# Patient Record
Sex: MALE | Race: WHITE | NOT HISPANIC OR LATINO | Employment: UNEMPLOYED | URBAN - METROPOLITAN AREA
[De-identification: names, ages, dates, MRNs, and addresses within clinical notes are randomized per-mention and may not be internally consistent; named-entity substitution may affect disease eponyms.]

---

## 2021-06-21 ENCOUNTER — HOSPITAL ENCOUNTER (INPATIENT)
Facility: MEDICAL CENTER | Age: 61
LOS: 10 days | DRG: 224 | End: 2021-07-01
Attending: EMERGENCY MEDICINE | Admitting: HOSPITALIST
Payer: MEDICAID

## 2021-06-21 ENCOUNTER — HOSPITAL ENCOUNTER (OUTPATIENT)
Dept: RADIOLOGY | Facility: MEDICAL CENTER | Age: 61
End: 2021-06-21

## 2021-06-21 ENCOUNTER — APPOINTMENT (OUTPATIENT)
Dept: CARDIOLOGY | Facility: MEDICAL CENTER | Age: 61
DRG: 224 | End: 2021-06-21
Attending: INTERNAL MEDICINE
Payer: MEDICAID

## 2021-06-21 ENCOUNTER — APPOINTMENT (OUTPATIENT)
Dept: RADIOLOGY | Facility: MEDICAL CENTER | Age: 61
DRG: 224 | End: 2021-06-21
Attending: EMERGENCY MEDICINE
Payer: MEDICAID

## 2021-06-21 DIAGNOSIS — I63.9 CEREBROVASCULAR ACCIDENT (CVA), UNSPECIFIED MECHANISM (HCC): ICD-10-CM

## 2021-06-21 DIAGNOSIS — N17.9 AKI (ACUTE KIDNEY INJURY) (HCC): ICD-10-CM

## 2021-06-21 DIAGNOSIS — I25.5 ISCHEMIC CARDIOMYOPATHY: ICD-10-CM

## 2021-06-21 DIAGNOSIS — J96.00 ACUTE RESPIRATORY FAILURE, UNSPECIFIED WHETHER WITH HYPOXIA OR HYPERCAPNIA (HCC): ICD-10-CM

## 2021-06-21 DIAGNOSIS — I25.10 CORONARY ARTERY DISEASE INVOLVING NATIVE CORONARY ARTERY OF NATIVE HEART WITHOUT ANGINA PECTORIS: ICD-10-CM

## 2021-06-21 DIAGNOSIS — I46.9 CARDIAC ARREST (HCC): ICD-10-CM

## 2021-06-21 DIAGNOSIS — E87.0 HYPERNATREMIA: ICD-10-CM

## 2021-06-21 DIAGNOSIS — F10.920 ALCOHOLIC INTOXICATION WITHOUT COMPLICATION (HCC): ICD-10-CM

## 2021-06-21 DIAGNOSIS — I46.9 CARDIAC ARREST WITH SUCCESSFUL RESUSCITATION (HCC): ICD-10-CM

## 2021-06-21 PROBLEM — R73.9 HYPERGLYCEMIA: Status: ACTIVE | Noted: 2021-06-21

## 2021-06-21 PROBLEM — G93.40 ACUTE ENCEPHALOPATHY: Status: ACTIVE | Noted: 2021-06-21

## 2021-06-21 PROBLEM — E87.20 METABOLIC ACIDOSIS: Status: ACTIVE | Noted: 2021-06-21

## 2021-06-21 PROBLEM — F10.929 ALCOHOL INTOXICATION (HCC): Status: ACTIVE | Noted: 2021-06-21

## 2021-06-21 PROBLEM — D72.823 LEUKEMOID REACTION: Status: ACTIVE | Noted: 2021-06-21

## 2021-06-21 PROBLEM — E83.42 HYPOMAGNESEMIA: Status: ACTIVE | Noted: 2021-06-21

## 2021-06-21 PROBLEM — J96.01 ACUTE RESPIRATORY FAILURE WITH HYPOXIA (HCC): Status: ACTIVE | Noted: 2021-06-21

## 2021-06-21 LAB
ALBUMIN SERPL BCP-MCNC: 3.3 G/DL (ref 3.2–4.9)
ALBUMIN SERPL BCP-MCNC: 3.3 G/DL (ref 3.2–4.9)
ALBUMIN/GLOB SERPL: 1.2 G/DL
ALBUMIN/GLOB SERPL: 1.2 G/DL
ALP SERPL-CCNC: 63 U/L (ref 30–99)
ALP SERPL-CCNC: 73 U/L (ref 30–99)
ALT SERPL-CCNC: 36 U/L (ref 2–50)
ALT SERPL-CCNC: 39 U/L (ref 2–50)
AMPHET UR QL SCN: NEGATIVE
ANION GAP SERPL CALC-SCNC: 10 MMOL/L (ref 7–16)
ANION GAP SERPL CALC-SCNC: 11 MMOL/L (ref 7–16)
APTT PPP: 25.2 SEC (ref 24.7–36)
APTT PPP: 38.2 SEC (ref 24.7–36)
AST SERPL-CCNC: 54 U/L (ref 12–45)
AST SERPL-CCNC: 74 U/L (ref 12–45)
BARBITURATES UR QL SCN: NEGATIVE
BASE EXCESS BLDA CALC-SCNC: -2 MMOL/L (ref -4–3)
BASOPHILS # BLD AUTO: 0.3 % (ref 0–1.8)
BASOPHILS # BLD: 0.05 K/UL (ref 0–0.12)
BENZODIAZ UR QL SCN: POSITIVE
BILIRUB SERPL-MCNC: 0.3 MG/DL (ref 0.1–1.5)
BILIRUB SERPL-MCNC: 0.5 MG/DL (ref 0.1–1.5)
BODY TEMPERATURE: ABNORMAL DEGREES
BREATHS SETTING VENT: 22
BUN SERPL-MCNC: 12 MG/DL (ref 8–22)
BUN SERPL-MCNC: 9 MG/DL (ref 8–22)
BZE UR QL SCN: NEGATIVE
CALCIUM SERPL-MCNC: 7.9 MG/DL (ref 8.5–10.5)
CALCIUM SERPL-MCNC: 8.2 MG/DL (ref 8.5–10.5)
CANNABINOIDS UR QL SCN: POSITIVE
CHLORIDE SERPL-SCNC: 107 MMOL/L (ref 96–112)
CHLORIDE SERPL-SCNC: 112 MMOL/L (ref 96–112)
CHOLEST SERPL-MCNC: 151 MG/DL (ref 100–199)
CO2 BLDA-SCNC: 22 MMOL/L (ref 20–33)
CO2 SERPL-SCNC: 20 MMOL/L (ref 20–33)
CO2 SERPL-SCNC: 23 MMOL/L (ref 20–33)
CREAT SERPL-MCNC: 0.76 MG/DL (ref 0.5–1.4)
CREAT SERPL-MCNC: 0.83 MG/DL (ref 0.5–1.4)
DELSYS IDSYS: ABNORMAL
EKG IMPRESSION: NORMAL
EOSINOPHIL # BLD AUTO: 0.02 K/UL (ref 0–0.51)
EOSINOPHIL NFR BLD: 0.1 % (ref 0–6.9)
ERYTHROCYTE [DISTWIDTH] IN BLOOD BY AUTOMATED COUNT: 50.5 FL (ref 35.9–50)
EST. AVERAGE GLUCOSE BLD GHB EST-MCNC: 97 MG/DL
GLOBULIN SER CALC-MCNC: 2.7 G/DL (ref 1.9–3.5)
GLOBULIN SER CALC-MCNC: 2.8 G/DL (ref 1.9–3.5)
GLUCOSE BLD-MCNC: 122 MG/DL (ref 65–99)
GLUCOSE BLD-MCNC: 87 MG/DL (ref 65–99)
GLUCOSE BLD-MCNC: 89 MG/DL (ref 65–99)
GLUCOSE SERPL-MCNC: 104 MG/DL (ref 65–99)
GLUCOSE SERPL-MCNC: 108 MG/DL (ref 65–99)
HBA1C MFR BLD: 5 % (ref 4–5.6)
HCO3 BLDA-SCNC: 20.9 MMOL/L (ref 17–25)
HCT VFR BLD AUTO: 41.1 % (ref 42–52)
HDLC SERPL-MCNC: 33 MG/DL
HGB BLD-MCNC: 13.7 G/DL (ref 14–18)
HOROWITZ INDEX BLDA+IHG-RTO: 184 MM[HG]
IMM GRANULOCYTES # BLD AUTO: 0.15 K/UL (ref 0–0.11)
IMM GRANULOCYTES NFR BLD AUTO: 1 % (ref 0–0.9)
INR PPP: 1.09 (ref 0.87–1.13)
INR PPP: 1.1 (ref 0.87–1.13)
LDLC SERPL CALC-MCNC: 87 MG/DL
LV EJECT FRACT  99904: 35
LV EJECT FRACT MOD 2C 99903: 24.26
LV EJECT FRACT MOD 4C 99902: 40.19
LV EJECT FRACT MOD BP 99901: 34.22
LYMPHOCYTES # BLD AUTO: 1.07 K/UL (ref 1–4.8)
LYMPHOCYTES NFR BLD: 6.9 % (ref 22–41)
MAGNESIUM SERPL-MCNC: 1.8 MG/DL (ref 1.5–2.5)
MAGNESIUM SERPL-MCNC: 2.2 MG/DL (ref 1.5–2.5)
MCH RBC QN AUTO: 32.5 PG (ref 27–33)
MCHC RBC AUTO-ENTMCNC: 33.3 G/DL (ref 33.7–35.3)
MCV RBC AUTO: 97.6 FL (ref 81.4–97.8)
METHADONE UR QL SCN: NEGATIVE
MODE IMODE: ABNORMAL
MONOCYTES # BLD AUTO: 0.89 K/UL (ref 0–0.85)
MONOCYTES NFR BLD AUTO: 5.8 % (ref 0–13.4)
NEUTROPHILS # BLD AUTO: 13.25 K/UL (ref 1.82–7.42)
NEUTROPHILS NFR BLD: 85.9 % (ref 44–72)
NRBC # BLD AUTO: 0 K/UL
NRBC BLD-RTO: 0 /100 WBC
NT-PROBNP SERPL IA-MCNC: 419 PG/ML (ref 0–125)
O2/TOTAL GAS SETTING VFR VENT: 55 %
OPIATES UR QL SCN: NEGATIVE
OXYCODONE UR QL SCN: NEGATIVE
PCO2 BLDA: 29.7 MMHG (ref 26–37)
PCO2 TEMP ADJ BLDA: 28.6 MMHG (ref 26–37)
PCP UR QL SCN: NEGATIVE
PEEP END EXPIRATORY PRESSURE IPEEP: 8 CMH20
PH BLDA: 7.46 [PH] (ref 7.4–7.5)
PH TEMP ADJ BLDA: 7.47 [PH] (ref 7.4–7.5)
PHOSPHATE SERPL-MCNC: 2.9 MG/DL (ref 2.5–4.5)
PLATELET # BLD AUTO: 240 K/UL (ref 164–446)
PMV BLD AUTO: 9.8 FL (ref 9–12.9)
PO2 BLDA: 101 MMHG (ref 64–87)
PO2 TEMP ADJ BLDA: 96 MMHG (ref 64–87)
POTASSIUM SERPL-SCNC: 4 MMOL/L (ref 3.6–5.5)
POTASSIUM SERPL-SCNC: 4.5 MMOL/L (ref 3.6–5.5)
PROPOXYPH UR QL SCN: NEGATIVE
PROT SERPL-MCNC: 6 G/DL (ref 6–8.2)
PROT SERPL-MCNC: 6.1 G/DL (ref 6–8.2)
PROTHROMBIN TIME: 13.8 SEC (ref 12–14.6)
PROTHROMBIN TIME: 13.9 SEC (ref 12–14.6)
RBC # BLD AUTO: 4.21 M/UL (ref 4.7–6.1)
SAO2 % BLDA: 98 % (ref 93–99)
SODIUM SERPL-SCNC: 141 MMOL/L (ref 135–145)
SODIUM SERPL-SCNC: 142 MMOL/L (ref 135–145)
SPECIMEN DRAWN FROM PATIENT: ABNORMAL
TIDAL VOLUME IVT: 450 ML
TRIGL SERPL-MCNC: 155 MG/DL (ref 0–149)
TROPONIN T SERPL-MCNC: 230 NG/L (ref 6–19)
TROPONIN T SERPL-MCNC: 429 NG/L (ref 6–19)
TROPONIN T SERPL-MCNC: 582 NG/L (ref 6–19)
UFH PPP CHRO-ACNC: <0.1 IU/ML
UFH PPP CHRO-ACNC: <0.1 IU/ML
WBC # BLD AUTO: 15.4 K/UL (ref 4.8–10.8)

## 2021-06-21 PROCEDURE — 85025 COMPLETE CBC W/AUTO DIFF WBC: CPT

## 2021-06-21 PROCEDURE — 700101 HCHG RX REV CODE 250: Performed by: INTERNAL MEDICINE

## 2021-06-21 PROCEDURE — A9270 NON-COVERED ITEM OR SERVICE: HCPCS | Performed by: INTERNAL MEDICINE

## 2021-06-21 PROCEDURE — 700105 HCHG RX REV CODE 258: Performed by: HOSPITALIST

## 2021-06-21 PROCEDURE — 93005 ELECTROCARDIOGRAM TRACING: CPT | Performed by: HOSPITALIST

## 2021-06-21 PROCEDURE — 93005 ELECTROCARDIOGRAM TRACING: CPT | Performed by: EMERGENCY MEDICINE

## 2021-06-21 PROCEDURE — 5A1935Z RESPIRATORY VENTILATION, LESS THAN 24 CONSECUTIVE HOURS: ICD-10-PCS | Performed by: EMERGENCY MEDICINE

## 2021-06-21 PROCEDURE — 96368 THER/DIAG CONCURRENT INF: CPT

## 2021-06-21 PROCEDURE — 99223 1ST HOSP IP/OBS HIGH 75: CPT | Performed by: HOSPITALIST

## 2021-06-21 PROCEDURE — 700111 HCHG RX REV CODE 636 W/ 250 OVERRIDE (IP)

## 2021-06-21 PROCEDURE — 80307 DRUG TEST PRSMV CHEM ANLYZR: CPT

## 2021-06-21 PROCEDURE — 0BP1XDZ REMOVAL OF INTRALUMINAL DEVICE FROM TRACHEA, EXTERNAL APPROACH: ICD-10-PCS | Performed by: STUDENT IN AN ORGANIZED HEALTH CARE EDUCATION/TRAINING PROGRAM

## 2021-06-21 PROCEDURE — 93010 ELECTROCARDIOGRAM REPORT: CPT | Performed by: INTERNAL MEDICINE

## 2021-06-21 PROCEDURE — 84484 ASSAY OF TROPONIN QUANT: CPT | Mod: 91

## 2021-06-21 PROCEDURE — 84100 ASSAY OF PHOSPHORUS: CPT

## 2021-06-21 PROCEDURE — 700102 HCHG RX REV CODE 250 W/ 637 OVERRIDE(OP): Performed by: INTERNAL MEDICINE

## 2021-06-21 PROCEDURE — 700111 HCHG RX REV CODE 636 W/ 250 OVERRIDE (IP): Performed by: HOSPITALIST

## 2021-06-21 PROCEDURE — 83036 HEMOGLOBIN GLYCOSYLATED A1C: CPT

## 2021-06-21 PROCEDURE — 83735 ASSAY OF MAGNESIUM: CPT

## 2021-06-21 PROCEDURE — 80061 LIPID PANEL: CPT

## 2021-06-21 PROCEDURE — 85520 HEPARIN ASSAY: CPT | Mod: 91

## 2021-06-21 PROCEDURE — 770022 HCHG ROOM/CARE - ICU (200)

## 2021-06-21 PROCEDURE — 700111 HCHG RX REV CODE 636 W/ 250 OVERRIDE (IP): Performed by: INTERNAL MEDICINE

## 2021-06-21 PROCEDURE — 99255 IP/OBS CONSLTJ NEW/EST HI 80: CPT | Performed by: INTERNAL MEDICINE

## 2021-06-21 PROCEDURE — 94799 UNLISTED PULMONARY SVC/PX: CPT

## 2021-06-21 PROCEDURE — 82962 GLUCOSE BLOOD TEST: CPT | Mod: 91

## 2021-06-21 PROCEDURE — 96366 THER/PROPH/DIAG IV INF ADDON: CPT

## 2021-06-21 PROCEDURE — 93306 TTE W/DOPPLER COMPLETE: CPT

## 2021-06-21 PROCEDURE — 71045 X-RAY EXAM CHEST 1 VIEW: CPT

## 2021-06-21 PROCEDURE — 700102 HCHG RX REV CODE 250 W/ 637 OVERRIDE(OP): Performed by: HOSPITALIST

## 2021-06-21 PROCEDURE — 94002 VENT MGMT INPAT INIT DAY: CPT

## 2021-06-21 PROCEDURE — 99291 CRITICAL CARE FIRST HOUR: CPT

## 2021-06-21 PROCEDURE — 93306 TTE W/DOPPLER COMPLETE: CPT | Mod: 26 | Performed by: INTERNAL MEDICINE

## 2021-06-21 PROCEDURE — 94003 VENT MGMT INPAT SUBQ DAY: CPT

## 2021-06-21 PROCEDURE — 700111 HCHG RX REV CODE 636 W/ 250 OVERRIDE (IP): Performed by: EMERGENCY MEDICINE

## 2021-06-21 PROCEDURE — 82803 BLOOD GASES ANY COMBINATION: CPT

## 2021-06-21 PROCEDURE — 85610 PROTHROMBIN TIME: CPT | Mod: 91

## 2021-06-21 PROCEDURE — 96375 TX/PRO/DX INJ NEW DRUG ADDON: CPT

## 2021-06-21 PROCEDURE — 85730 THROMBOPLASTIN TIME PARTIAL: CPT | Mod: 91

## 2021-06-21 PROCEDURE — 96365 THER/PROPH/DIAG IV INF INIT: CPT

## 2021-06-21 PROCEDURE — 36600 WITHDRAWAL OF ARTERIAL BLOOD: CPT

## 2021-06-21 PROCEDURE — 80053 COMPREHEN METABOLIC PANEL: CPT | Mod: 91

## 2021-06-21 PROCEDURE — 83880 ASSAY OF NATRIURETIC PEPTIDE: CPT

## 2021-06-21 PROCEDURE — 99291 CRITICAL CARE FIRST HOUR: CPT | Performed by: INTERNAL MEDICINE

## 2021-06-21 PROCEDURE — A9270 NON-COVERED ITEM OR SERVICE: HCPCS | Performed by: HOSPITALIST

## 2021-06-21 RX ORDER — NALOXONE HYDROCHLORIDE 0.4 MG/ML
0.1 INJECTION, SOLUTION INTRAMUSCULAR; INTRAVENOUS; SUBCUTANEOUS ONCE
Status: ACTIVE | OUTPATIENT
Start: 2021-06-21 | End: 2021-06-22

## 2021-06-21 RX ORDER — ENALAPRILAT 1.25 MG/ML
1.25 INJECTION INTRAVENOUS EVERY 6 HOURS PRN
Status: DISCONTINUED | OUTPATIENT
Start: 2021-06-21 | End: 2021-07-01 | Stop reason: HOSPADM

## 2021-06-21 RX ORDER — FOLIC ACID 1 MG/1
1 TABLET ORAL DAILY
Status: COMPLETED | OUTPATIENT
Start: 2021-06-22 | End: 2021-06-25

## 2021-06-21 RX ORDER — ONDANSETRON 2 MG/ML
4 INJECTION INTRAMUSCULAR; INTRAVENOUS EVERY 4 HOURS PRN
Status: DISCONTINUED | OUTPATIENT
Start: 2021-06-21 | End: 2021-07-01 | Stop reason: HOSPADM

## 2021-06-21 RX ORDER — MIDAZOLAM HYDROCHLORIDE 1 MG/ML
4 INJECTION INTRAMUSCULAR; INTRAVENOUS ONCE
Status: COMPLETED | OUTPATIENT
Start: 2021-06-21 | End: 2021-06-21

## 2021-06-21 RX ORDER — HEPARIN SODIUM 1000 [USP'U]/ML
2000 INJECTION, SOLUTION INTRAVENOUS; SUBCUTANEOUS PRN
Status: DISCONTINUED | OUTPATIENT
Start: 2021-06-21 | End: 2021-06-27

## 2021-06-21 RX ORDER — NALOXONE HYDROCHLORIDE 0.4 MG/ML
INJECTION, SOLUTION INTRAMUSCULAR; INTRAVENOUS; SUBCUTANEOUS
Status: COMPLETED
Start: 2021-06-21 | End: 2021-06-21

## 2021-06-21 RX ORDER — MAGNESIUM SULFATE HEPTAHYDRATE 40 MG/ML
2 INJECTION, SOLUTION INTRAVENOUS ONCE
Status: COMPLETED | OUTPATIENT
Start: 2021-06-21 | End: 2021-06-21

## 2021-06-21 RX ORDER — ATORVASTATIN CALCIUM 40 MG/1
40 TABLET, FILM COATED ORAL EVERY EVENING
Status: DISCONTINUED | OUTPATIENT
Start: 2021-06-21 | End: 2021-06-25

## 2021-06-21 RX ORDER — SODIUM CHLORIDE, SODIUM LACTATE, POTASSIUM CHLORIDE, CALCIUM CHLORIDE 600; 310; 30; 20 MG/100ML; MG/100ML; MG/100ML; MG/100ML
INJECTION, SOLUTION INTRAVENOUS CONTINUOUS
Status: DISCONTINUED | OUTPATIENT
Start: 2021-06-21 | End: 2021-06-23

## 2021-06-21 RX ORDER — ASPIRIN 325 MG
325 TABLET ORAL DAILY
Status: DISCONTINUED | OUTPATIENT
Start: 2021-06-22 | End: 2021-06-22

## 2021-06-21 RX ORDER — BISACODYL 10 MG
10 SUPPOSITORY, RECTAL RECTAL
Status: DISCONTINUED | OUTPATIENT
Start: 2021-06-21 | End: 2021-07-01 | Stop reason: HOSPADM

## 2021-06-21 RX ORDER — PROCHLORPERAZINE EDISYLATE 5 MG/ML
5-10 INJECTION INTRAMUSCULAR; INTRAVENOUS EVERY 4 HOURS PRN
Status: DISCONTINUED | OUTPATIENT
Start: 2021-06-21 | End: 2021-07-01 | Stop reason: HOSPADM

## 2021-06-21 RX ORDER — ASPIRIN 81 MG/1
324 TABLET, CHEWABLE ORAL DAILY
Status: DISCONTINUED | OUTPATIENT
Start: 2021-06-22 | End: 2021-06-22

## 2021-06-21 RX ORDER — HEPARIN SODIUM 5000 [USP'U]/100ML
0-30 INJECTION, SOLUTION INTRAVENOUS CONTINUOUS
Status: DISCONTINUED | OUTPATIENT
Start: 2021-06-21 | End: 2021-06-23

## 2021-06-21 RX ORDER — AMOXICILLIN 250 MG
2 CAPSULE ORAL 2 TIMES DAILY
Status: DISCONTINUED | OUTPATIENT
Start: 2021-06-21 | End: 2021-07-01 | Stop reason: HOSPADM

## 2021-06-21 RX ORDER — LIDOCAINE 50 MG/G
1 PATCH TOPICAL EVERY 24 HOURS
Status: DISCONTINUED | OUTPATIENT
Start: 2021-06-21 | End: 2021-07-01 | Stop reason: HOSPADM

## 2021-06-21 RX ORDER — FAMOTIDINE 20 MG/1
20 TABLET, FILM COATED ORAL EVERY 12 HOURS
Status: DISCONTINUED | OUTPATIENT
Start: 2021-06-21 | End: 2021-06-22

## 2021-06-21 RX ORDER — PROMETHAZINE HYDROCHLORIDE 25 MG/1
12.5-25 TABLET ORAL EVERY 4 HOURS PRN
Status: DISCONTINUED | OUTPATIENT
Start: 2021-06-21 | End: 2021-07-01 | Stop reason: HOSPADM

## 2021-06-21 RX ORDER — ASPIRIN 300 MG/1
300 SUPPOSITORY RECTAL DAILY
Status: DISCONTINUED | OUTPATIENT
Start: 2021-06-22 | End: 2021-06-22

## 2021-06-21 RX ORDER — ONDANSETRON 4 MG/1
4 TABLET, ORALLY DISINTEGRATING ORAL EVERY 4 HOURS PRN
Status: DISCONTINUED | OUTPATIENT
Start: 2021-06-21 | End: 2021-07-01 | Stop reason: HOSPADM

## 2021-06-21 RX ORDER — ACETAMINOPHEN 325 MG/1
650 TABLET ORAL EVERY 6 HOURS PRN
Status: DISCONTINUED | OUTPATIENT
Start: 2021-06-21 | End: 2021-07-01 | Stop reason: HOSPADM

## 2021-06-21 RX ORDER — POLYETHYLENE GLYCOL 3350 17 G/17G
1 POWDER, FOR SOLUTION ORAL
Status: DISCONTINUED | OUTPATIENT
Start: 2021-06-21 | End: 2021-07-01 | Stop reason: HOSPADM

## 2021-06-21 RX ORDER — GAUZE BANDAGE 2" X 2"
100 BANDAGE TOPICAL DAILY
Status: COMPLETED | OUTPATIENT
Start: 2021-06-22 | End: 2021-06-25

## 2021-06-21 RX ORDER — HEPARIN SODIUM 1000 [USP'U]/ML
4000 INJECTION, SOLUTION INTRAVENOUS; SUBCUTANEOUS ONCE
Status: COMPLETED | OUTPATIENT
Start: 2021-06-21 | End: 2021-06-21

## 2021-06-21 RX ORDER — PROMETHAZINE HYDROCHLORIDE 25 MG/1
12.5-25 SUPPOSITORY RECTAL EVERY 4 HOURS PRN
Status: DISCONTINUED | OUTPATIENT
Start: 2021-06-21 | End: 2021-07-01 | Stop reason: HOSPADM

## 2021-06-21 RX ORDER — LABETALOL HYDROCHLORIDE 5 MG/ML
10-20 INJECTION, SOLUTION INTRAVENOUS EVERY 4 HOURS PRN
Status: DISCONTINUED | OUTPATIENT
Start: 2021-06-21 | End: 2021-07-01 | Stop reason: HOSPADM

## 2021-06-21 RX ADMIN — PROPOFOL 50 MCG/KG/MIN: 10 INJECTION, EMULSION INTRAVENOUS at 10:20

## 2021-06-21 RX ADMIN — MIDAZOLAM HYDROCHLORIDE 4 MG: 1 INJECTION, SOLUTION INTRAMUSCULAR; INTRAVENOUS at 08:00

## 2021-06-21 RX ADMIN — INSULIN HUMAN 3 UNITS: 100 INJECTION, SOLUTION PARENTERAL at 13:11

## 2021-06-21 RX ADMIN — HEPARIN SODIUM 2000 UNITS: 1000 INJECTION, SOLUTION INTRAVENOUS; SUBCUTANEOUS at 21:39

## 2021-06-21 RX ADMIN — Medication 75 MCG: at 09:09

## 2021-06-21 RX ADMIN — ACETAMINOPHEN 650 MG: 325 TABLET, FILM COATED ORAL at 20:43

## 2021-06-21 RX ADMIN — HEPARIN SODIUM 11.09 UNITS/KG/HR: 5000 INJECTION, SOLUTION INTRAVENOUS at 09:20

## 2021-06-21 RX ADMIN — DOCUSATE SODIUM 50 MG AND SENNOSIDES 8.6 MG 2 TABLET: 8.6; 5 TABLET, FILM COATED ORAL at 17:22

## 2021-06-21 RX ADMIN — HEPARIN SODIUM 4000 UNITS: 1000 INJECTION, SOLUTION INTRAVENOUS; SUBCUTANEOUS at 09:23

## 2021-06-21 RX ADMIN — ATORVASTATIN CALCIUM 40 MG: 40 TABLET, FILM COATED ORAL at 17:22

## 2021-06-21 RX ADMIN — FAMOTIDINE 20 MG: 10 INJECTION INTRAVENOUS at 17:22

## 2021-06-21 RX ADMIN — METOPROLOL TARTRATE 12.5 MG: 25 TABLET, FILM COATED ORAL at 17:29

## 2021-06-21 RX ADMIN — THIAMINE HYDROCHLORIDE: 100 INJECTION, SOLUTION INTRAMUSCULAR; INTRAVENOUS at 10:20

## 2021-06-21 RX ADMIN — MAGNESIUM SULFATE 2 G: 2 INJECTION INTRAVENOUS at 13:10

## 2021-06-21 RX ADMIN — PROPOFOL 10 MCG/KG/MIN: 10 INJECTION, EMULSION INTRAVENOUS at 07:00

## 2021-06-21 RX ADMIN — LIDOCAINE 1 PATCH: 50 PATCH TOPICAL at 16:47

## 2021-06-21 RX ADMIN — NALOXONE HYDROCHLORIDE 0.1 MG: 0.4 INJECTION, SOLUTION INTRAMUSCULAR; INTRAVENOUS; SUBCUTANEOUS at 14:52

## 2021-06-21 RX ADMIN — SODIUM CHLORIDE, POTASSIUM CHLORIDE, SODIUM LACTATE AND CALCIUM CHLORIDE: 600; 310; 30; 20 INJECTION, SOLUTION INTRAVENOUS at 12:33

## 2021-06-21 ASSESSMENT — PAIN SCALES - WONG BAKER: WONGBAKER_NUMERICALRESPONSE: DOESN'T HURT AT ALL

## 2021-06-21 ASSESSMENT — PAIN DESCRIPTION - PAIN TYPE
TYPE: ACUTE PAIN
TYPE: ACUTE PAIN

## 2021-06-21 ASSESSMENT — LIFESTYLE VARIABLES: DOES PATIENT WANT TO STOP DRINKING: CANNOT ASSESS

## 2021-06-21 ASSESSMENT — COGNITIVE AND FUNCTIONAL STATUS - GENERAL
MOBILITY SCORE: 24
SUGGESTED CMS G CODE MODIFIER MOBILITY: CH

## 2021-06-21 ASSESSMENT — PATIENT HEALTH QUESTIONNAIRE - PHQ9
1. LITTLE INTEREST OR PLEASURE IN DOING THINGS: NOT AT ALL
2. FEELING DOWN, DEPRESSED, IRRITABLE, OR HOPELESS: NOT AT ALL
SUM OF ALL RESPONSES TO PHQ9 QUESTIONS 1 AND 2: 0

## 2021-06-21 ASSESSMENT — FIBROSIS 4 INDEX
FIB4 SCORE: 2.325
FIB4 SCORE: 1.43

## 2021-06-21 NOTE — PROGRESS NOTES
----- Message from Radha Dumont Tatyana Huerta updated with the address/phone number for [facility] and given instructions to go there and they will let her into patient's house.

## 2021-06-21 NOTE — PROGRESS NOTES
4 Eyes Skin Assessment Completed by TATE Christiansen and TATE Limon.    Head WDL  Ears WDL  Nose WDL  Mouth WDL  Neck WDL  Breast/Chest WDL  Shoulder Blades WDL  Spine WDL  (R) Arm/Elbow/Hand WDL  (L) Arm/Elbow/Hand WDL  Abdomen Bruising  Groin WDL  Scrotum/Coccyx/Buttocks WDL  (R) Leg Scar  (L) Leg WDL  (R) Heel/Foot/Toe WDL  (L) Heel/Foot/Toe WDL          Devices In Places OG/NG      Interventions In Place Low Air Loss Mattress    Possible Skin Injury No    Pictures Uploaded Into Epic N/A  Wound Consult Placed N/A  RN Wound Prevention Protocol Ordered No

## 2021-06-21 NOTE — ED NOTES
Report given to TATE Limon. Waiting for ICU transport to [facility]. Pt remains stable.

## 2021-06-21 NOTE — ASSESSMENT & PLAN NOTE
Renal function returned to normal  Likely component of ATN in the setting of cardiac arrest  Avoid nephrotoxins renally dose medications  Monitor creatinine, urine output, electrolytes closely

## 2021-06-21 NOTE — H&P
Hospital Medicine History & Physical Note    Date of Service  6/21/2021    Primary Care Physician  No primary care provider on file.    Consultants  Cardiology    Code Status  Full Code    Chief Complaint  Chief Complaint   Patient presents with   • Cardiac Arrest     PT transfered from [place].  PT was shocked x 1 by AED and transported to ED with pulses.       History of Presenting Illness  60 y.o. male who presented 6/21/2021 who had an out of hospital arrest, and consequently there is little previous medical history available to us.    Patient was apparently at a [place] in [facility], when he was found down for an unknown amount of time.  Bystander CPR was initiated, and an AED was attached which discharged x1.  When EMS arrived they placed an OPA and started transport.  He had several rounds of CPR in transit, and apparently had return of neurologic function to the degree that he remove the OPA.  At that point he was combative, but was able to tell the paramedics his name.  He was subsequently intubated in the emergency room at [facility], and sent to us.  Prior to transport he was bolused with heparin and given aspirin.    Here in our ED the patient has had stable vital signs, on the ventilator.  He has woken up to the point where he was following commands.  EKG has been done, shows low voltage, no evidence of acute ischemic changes and specifically no ST elevation.  Review of systems and HPI is limited as the patient is still intubated.    Review of Systems  Review of Systems   Unable to perform ROS: Intubated       Past Medical History  Unobtainable as the patient is intubated    Surgical History  Unobtainable as the patient is intubated    Family History  Unobtainable as the patient is intubated    Social History   reports that he has never smoked. He has never used smokeless tobacco. He reports current alcohol use. He reports that he does not  use drugs.  Details unknown however the patient does present acutely intoxicated so evidently he drinks at least sometimes.  Allergies  No Known Allergies    Medications  None       Physical Exam  Temp:  [36.1 °C (97 °F)-36.8 °C (98.2 °F)] 36.8 °C (98.2 °F)  Pulse:  [] 103  Resp:  [18-22] 20  BP: ()/() 129/81  SpO2:  [69 %-100 %] 100 %    Physical Exam  Vitals reviewed.   Constitutional:       General: He is not in acute distress.     Appearance: Normal appearance. He is well-developed. He is obese. He is not diaphoretic.   HENT:      Head: Normocephalic and atraumatic.   Eyes:      Conjunctiva/sclera: Conjunctivae normal.   Neck:      Vascular: No JVD.   Cardiovascular:      Rate and Rhythm: Normal rate.      Heart sounds: No murmur heard.   No gallop.    Pulmonary:      Effort: No respiratory distress.      Breath sounds: No stridor. No wheezing or rales.      Comments: Intubated  Abdominal:      Palpations: Abdomen is soft.      Tenderness: There is no abdominal tenderness. There is no guarding or rebound.   Musculoskeletal:         General: No tenderness.      Right lower leg: Edema present.      Left lower leg: Edema present.      Comments: Trace edema right greater than left no palpable cords   Skin:     General: Skin is warm and dry.      Findings: No rash.   Neurological:      Mental Status: He is alert.      Comments: On my examination the patient is moving all 4 extremities purposefully.  Exam is limited as he is on propofol         Laboratory:  Recent Labs     06/21/21 0415 06/21/21 0626   WBC 14.7* 15.4*   RBC 4.60* 4.21*   HEMOGLOBIN 14.9 13.7*   HEMATOCRIT 45.8 41.1*   MCV 99.6* 97.6   MCH 32.4* 32.5   MCHC 32.5* 33.3*   RDW 13.7 50.5*   PLATELETCT 326 240   MPV 9.3 9.8     Recent Labs     06/21/21 0415   SODIUM 152*   POTASSIUM 4.0   CHLORIDE 113*   CO2 15*   GLUCOSE 181*   BUN 12   CREATININE 1.3   CALCIUM 8.4*     Recent Labs     06/21/21 0415   ALTSGPT 48   ASTSGOT 52*    ALKPHOSPHAT 81   TBILIRUBIN 0.2   GLUCOSE 181*     Recent Labs     06/21/21  0415   APTT 21.0*   INR 0.95     Recent Labs     06/21/21  0415   NTPROBNP 188*         No results for input(s): TROPONINT in the last 72 hours.    Imaging:  EC-ECHOCARDIOGRAM COMPLETE W/O CONT         OUTSIDE IMAGES-CT CERVICAL SPINE   Final Result      OUTSIDE IMAGES-CT HEAD   Final Result      OUTSIDE IMAGES-DX CHEST   Final Result      DX-CHEST-PORTABLE (1 VIEW)   Final Result         Intubated.      Patchy bilateral lower lung opacities, atelectasis or infection.      Mildly prominent cardiopericardial silhouette.            Assessment/Plan:  I anticipate this patient will require at least two midnights for appropriate medical management, necessitating inpatient admission.    Alcohol intoxication (HCC)  Assessment & Plan  Patient presents with an elevated BAL unknown if he is a chronic drinker.    Metabolic acidosis  Assessment & Plan  Patient presents with metabolic acidosis with elevated lactic acid  Continue fluid resuscitation is clearly hypovolemic cautiously however he may have an acute ischemic event.  Repeat lactic acid  Doubt sepsis as chest x-ray is clear UA does not indicate infection or just a physical exam show any focus of infection.    Hyperglycemia  Assessment & Plan  Suspect underlying diabetes versus stress response however history is unknown  Sliding scale insulin  Check A1c    Hypernatremia  Assessment & Plan  Continue fluid resuscitation with lactated Ringer's    WEN (acute kidney injury) ([facility])  Assessment & Plan  Some degree of prerenal based on hypovolemia  Continue fluid resuscitation  Renally dose medications as appropriate  Follow urine output, Place Madd[place]  Follow daily BMP  Consider further work-up if he does not normalize    Acute respiratory failure with hypoxia (HCC)  Assessment & Plan  Patient intubated for airway protection following out-of-hospital arrest  Appears to have survived event  neurologically  Admit to the ICU  Critical care has been consulted    Cardiac arrest (HCC)  Assessment & Plan  Patient suffered an out of hospital arrest with a shockable rhythm unfortunately do not have rhythm strip to give us more details.  Cardiology has been consulted  Admit to the ICU  Continue telemetry  Check magnesium  Follow troponins  Cardiac echo  We will likely need an ischemic work-up  ASA  Heparin drip  Statin

## 2021-06-21 NOTE — ASSESSMENT & PLAN NOTE
Slowly better but still confused enough to be inappropriate for consent  Secondary to arrest versus alcohol intoxication?  Likely the former, monitoring for withdrawal secondary to the latter  Limit sedatives with close neurologic monitoring  Seizure and aspiration precautions  Suspect mild anoxic injury from arrest, hopefully will improve with time, monitoring  Brother thought his short-term memory was off during conversation with patient and him 6/22

## 2021-06-21 NOTE — PROGRESS NOTES
"Patient became very fixated on leaving to care for his cats. Per patient, his brother Kam is dead. He gave permission to go through his phone and asked this RN to find Tatyana Huerta and to call her and ask her to go to his house to feed his cats. Patient gave permission to tell Tatyana that is he is in the hospital and why. This RN called Tatyana on patient's phone and explained situation and asked her to feed/care for patient's cats. Tatyana agreed to care for cats but has no way to get into the house. Patient's keys are not with his belongings. Tatyana could not stay on the phone but requested a call back with directions of how to get into the patient's house.    After that phone call patient directed this RN to find \"Sarai Lopez\" in his phone (his landlord) and to explain the situation and to request Tatyana be let into his residence. This RN called Sarai Lopez and explained the situation. They verified the request with the patient and were agreeable to give Tatyana a narvaez to the apartment if she swings by.   "

## 2021-06-21 NOTE — ASSESSMENT & PLAN NOTE
Patient intubated for airway protection following out-of-hospital arrest  Extubated  Saturating well on room air.  Resolved 6/29

## 2021-06-21 NOTE — ED TRIAGE NOTES
"Chief Complaint   Patient presents with   • Cardiac Arrest     PT transfered from [place] post arrest in local [facility].  PT was shocked x 1 by AED and transported to ED with pulses.     Blood Pressure 123/78   Pulse 100   Temperature 36.8 °C (98.2 °F) (Temporal)   Respiration 18   Height 1.778 m (5' 10\")   Weight 116 kg (255 lb 11.7 oz)   Oxygen Saturation 94%   Body Mass Index 36.69 kg/m²     "

## 2021-06-21 NOTE — DISCHARGE PLANNING
Care Transition Team Discharge Planning    Anticipated Discharge Disposition: TBD    Action: Lsw received call from Bs RN. Pt will need consents signed. Pt is not currently A/O.     Lsw sent email requesting Tomo ClasesK search.      NOK results:  Found some possible relatives:    Tremaine Rosenbaum @ [phone]. Left VM.    Saleem LAWRENCE Deanna @ [phone] is not accepting calls at this time.    Mg MARINA Rosenbaum @ [phone] was disconnected.    Nga Díazabdoulaye @ [phone] the VM stated Any and [facility]. Lsw started to leave a VM, and the phone clicked, but no one was on the line.    Roxana Nassar @ [phone] there was a busy signal.    Raine Rosenbaum the number is no longer in service.    ---LSw called Nga again-pt is her ex-, ezkyps-Qubkn-aw's father and mother are , don't know how to reach either of pt's 2 brothers      ----Nga has number for pt's friend Donovan Polanco [phone]. He may know pt's brother's numbers. Lsw left VM.                Barriers to Discharge: TBD    Plan: Lsw will continue to follow, and assist w/ d/c planning.

## 2021-06-21 NOTE — CONSULTS
Critical Care Consultation    Date of consult: 6/21/2021    Referring Physician  Eris Serna M.D.;Je*    Reason for Consultation  Cardiac arrest, resp failure    History of Presenting Illness  60 y.o. male who presented 6/21/2021 with an unknown past medical history [place] when he suffered a witnessed cardiac arrest.  He underwent bystander CPR and AED was placed which defibrillated the patient x1.  Upon arrival by EMS, he remained pulseless and they performed ACLS for an additional 2 rounds before regaining spontaneous circulation.  They transported patient to the emergency department in [place] with an oropharyngeal airway in place.  Upon arrival to the ED, patient removed his own OPA and was very agitated and confused attempting to get out of bed.  He was subsequently intubated for airway protection and to facilitate work-up.  He was started on a propofol, fentanyl, heparin drip and given 300 mg of rectal aspirin.  A CT head and C-spine was performed which were both unremarkable before being transferred to [facility] for higher level of care.  In the emergency department here, patient awakens and follows but remains agitated and was seen by cardiology with plans for echocardiography.  He will not undergo code chill given his neurologic recovery.  No additional history is able to be obtained at this time given patient's current clinical condition.    Code Status  Full Code    Review of Systems  Review of Systems   Unable to perform ROS: Intubated       Past Medical History   has no past medical history on file. - unknown    Surgical History   has no past surgical history on file. - unknown    Family History  family history is not on file. - unknown    Social History   reports that he has never smoked. He has never used smokeless tobacco. He reports current alcohol use. He reports that he does not use drugs.    Medications  Home Medications     Reviewed by Destiny Vera PhT  (Pharmacy Tech) on 06/21/21 at 0816  Med List Status: Unable to Obtain   Medication Last Dose Status        Patient Estrada Taking any Medications                     Current Facility-Administered Medications   Medication Dose Route Frequency Provider Last Rate Last Admin   • Respiratory Therapy Consult   Nebulization Continuous RT Jeremy M Gonda, M.D.       • famotidine (PEPCID) tablet 20 mg  20 mg Enteral Tube Q12HRS Jeremy M Gonda, M.D.        Or   • famotidine (PEPCID) injection 20 mg  20 mg Intravenous Q12HRS Jeremy M Gonda, M.D.       • senna-docusate (PERICOLACE or SENOKOT S) 8.6-50 MG per tablet 2 tablet  2 tablet Enteral Tube BID Jeremy M Gonda, M.D.        And   • polyethylene glycol/lytes (MIRALAX) PACKET 1 Packet  1 Packet Enteral Tube QDAY PRN Jeremy M Gonda, M.D.        And   • magnesium hydroxide (MILK OF MAGNESIA) suspension 30 mL  30 mL Enteral Tube QDAY PRN Jeremy M Gonda, M.D.        And   • bisacodyl (DULCOLAX) suppository 10 mg  10 mg Rectal QDAY PRN Jeremy M Gonda, M.D.       • MD Alert...ICU Electrolyte Replacement per Pharmacy   Other PHARMACY TO DOSE Jeremy M Gonda, M.D.       • lidocaine (XYLOCAINE) 1 % injection 2 mL  2 mL Tracheal Tube Q30 MIN PRN Jeremy M Gonda, M.D.       • fentaNYL (SUBLIMAZE) injection 100 mcg  100 mcg Intravenous Q15 MIN PRN Jeremy M Gonda, M.D.        And   • fentaNYL (SUBLIMAZE) injection 200 mcg  200 mcg Intravenous Q15 MIN PRN Jeremy M Gonda, M.D.        And   • fentaNYL (SUBLIMAZE) 50 mcg/mL in 50mL (Continuous Infusion)   Intravenous Continuous Jeremy M Gonda, M.D.   75 mcg at 06/21/21 0909    And   • propofol (DIPRIVAN) injection  0-80 mcg/kg/min Intravenous Continuous Jeremy M Gonda, M.D.   Continue to Floor at 06/21/21 0926   • lactated ringers infusion   Intravenous Continuous Adam Chambers D.O.       • acetaminophen (Tylenol) tablet 650 mg  650 mg Oral Q6HRS PRN Adam Chambers D.O.       • [START ON 6/22/2021] aspirin (ASA) tablet 325 mg   325 mg Oral DAILY Adam Chambers D.O.        Or   • [START ON 6/22/2021] aspirin (ASA) chewable tab 324 mg  324 mg Oral DAILY Adam Chambers D.O.        Or   • [START ON 6/22/2021] aspirin (ASA) suppository 300 mg  300 mg Rectal DAILY CRISSY AlamoO.       • heparin infusion 25,000 units in 500 mL 0.45% NACL  0-30 Units/kg/hr (Adjusted) Intravenous Continuous CRISSY AlamoOKecia 20 mL/hr at 06/21/21 0920 11.09 Units/kg/hr at 06/21/21 0920   • heparin injection 2,000 Units  2,000 Units Intravenous PRN LIZETTE Alamo.O.       • metoprolol tartrate (LOPRESSOR) tablet 12.5 mg  12.5 mg Oral TWICE DAILY LIZETTE Alamo.O.       • atorvastatin (LIPITOR) tablet 40 mg  40 mg Oral Q EVENING LIZETTE Alamo.O.       • ondansetron (ZOFRAN) syringe/vial injection 4 mg  4 mg Intravenous Q4HRS PRN LIZETTE Alamo.O.       • ondansetron (ZOFRAN ODT) dispertab 4 mg  4 mg Oral Q4HRS PRN Adam Chambers D.O.       • promethazine (PHENERGAN) tablet 12.5-25 mg  12.5-25 mg Oral Q4HRS PRN LIZETTE Alamo.O.       • promethazine (PHENERGAN) suppository 12.5-25 mg  12.5-25 mg Rectal Q4HRS PRN LIZETTE Alamo.O.       • prochlorperazine (COMPAZINE) injection 5-10 mg  5-10 mg Intravenous Q4HRS PRN LIZETTE Alamo.O.       • insulin regular (HumuLIN R,NovoLIN R) injection  3-18 Units Subcutaneous 4X/DAY ACHS LIZETTE Alamo.O.       • detox IV 1000 mL (D5LR + magnesium 1 g + thiamine 100 mg + folic acid 1 mg) infusion   Intravenous Once Jeremy M Gonda, M.D.        And   • [START ON 6/22/2021] thiamine (Vitamin B-1) tablet 100 mg  100 mg Oral DAILY Jeremy M Gonda, M.D.        And   • [START ON 6/22/2021] folic acid (FOLVITE) tablet 1 mg  1 mg Oral DAILY Jeremy M Gonda, M.D.        And   • [START ON 6/22/2021] multivitamin (THERAGRAN) tablet 1 tablet  1 tablet Oral DAILY Jeremy M Gonda, M.D.         No current  outpatient medications on file.       Allergies  No Known Allergies    Vital Signs last 24 hours  Temp:  [36.1 °C (97 °F)-36.8 °C (98.2 °F)] 36.8 °C (98.2 °F)  Pulse:  [] 101  Resp:  [18-22] 22  BP: ()/() 146/95  SpO2:  [69 %-100 %] 100 %    Physical Exam  Physical Exam  Vitals and nursing note reviewed.   Constitutional:       General: He is sleeping.      Appearance: He is overweight. He is ill-appearing. He is not toxic-appearing.      Interventions: He is sedated, intubated and restrained.   HENT:      Head: Normocephalic and atraumatic.      Right Ear: External ear normal.      Left Ear: External ear normal.      Nose: Nose normal. No congestion.      Mouth/Throat:      Mouth: Mucous membranes are moist.      Pharynx: Oropharynx is clear.      Comments: ETT and OGT in place  Eyes:      General: No scleral icterus.     Conjunctiva/sclera: Conjunctivae normal.      Pupils: Pupils are equal, round, and reactive to light.   Cardiovascular:      Rate and Rhythm: Regular rhythm. Tachycardia present.      Pulses: Normal pulses.      Heart sounds: Normal heart sounds. No murmur heard.     Pulmonary:      Effort: No tachypnea or accessory muscle usage. He is intubated.      Breath sounds: Examination of the left-lower field reveals rhonchi. Rhonchi present. No wheezing or rales.      Comments: Good compliance  Abdominal:      General: Bowel sounds are normal. There is no distension.      Palpations: Abdomen is soft.      Tenderness: There is no abdominal tenderness. There is no guarding.   Genitourinary:     Comments: Madden cath in place  Musculoskeletal:         General: No tenderness.      Cervical back: Neck supple. No rigidity.      Right lower leg: No edema.      Left lower leg: No edema.   Skin:     General: Skin is warm and dry.      Capillary Refill: Capillary refill takes less than 2 seconds.      Coloration: Skin is not pale.   Neurological:      General: No focal deficit present.      Mental  Status: He is easily aroused.      Cranial Nerves: No cranial nerve deficit.      Comments: Jas, agitated but following with sedation wean   Psychiatric:      Comments: Unable to assess given current clinical condition         Fluids  No intake or output data in the 24 hours ending 06/21/21 0950    Laboratory  Recent Results (from the past 48 hour(s))   CBC WITH DIFFERENTIAL    Collection Time: 06/21/21  4:15 AM   Result Value Ref Range    WBC 14.7 (H) 4.8 - 10.8 K/uL    RBC 4.60 (L) 4.70 - 6.10 M/uL    Hemoglobin 14.9 14.0 - 18.0 g/dL    Hematocrit 45.8 42.0 - 52.0 %    MCV 99.6 (H) 80.0 - 94.0 fL    MCH 32.4 (H) 27.0 - 31.0 pg    MCHC 32.5 (L) 33.0 - 37.0 g/dL    RDW 13.7 11.5 - 14.5 %    Platelet Count 326 130 - 400 K/uL    MPV 9.3 7.4 - 10.4 fL    Neutrophils Automated 59.5 39.0 - 70.0 %    Lymphocytes Automated 26.4 21.0 - 50.0 %    Monocytes Automated 9.7 (H) 2.0 - 9.0 %    Eosinophils Automated 1.3 0.0 - 5.0 %    Basophils Automated 0.5 0.0 - 3.0 %    Abs Neutrophils Automated 8.8 (H) 1.8 - 7.7 K/uL    Abs Lymph Automated 3.9 1.2 - 4.8 K/uL    Eosinophil Count, Blood 0.19 0.00 - 0.50 K/uL   COMP METABOLIC PANEL    Collection Time: 06/21/21  4:15 AM   Result Value Ref Range    Sodium 152 (H) 136 - 145 mmol/L    Potassium 4.0 3.5 - 5.1 mmol/L    Chloride 113 (H) 98 - 107 mmol/L    Co2 15 (L) 21 - 32 mmol/L    Anion Gap 28 (H) 10 - 18 mmol/L    Glucose 181 (H) 74 - 99 mg/dL    Bun 12 7 - 18 mg/dL    Creatinine 1.3 0.8 - 1.3 mg/dL    Calcium 8.4 (L) 8.5 - 11.0 mg/dL    AST(SGOT) 52 (H) 15 - 37 U/L    ALT(SGPT) 48 12 - 78 U/L    Alkaline Phosphatase 81 46 - 116 U/L    Total Bilirubin 0.2 0.2 - 1.0 mg/dL    Albumin 3.6 3.4 - 5.0 g/dL    Total Protein 7.3 6.4 - 8.2 g/dL    A-G Ratio 1.0    TROPONIN    Collection Time: 06/21/21  4:15 AM   Result Value Ref Range    Troponin I 0.49 (HH) 0.00 - 0.06 ng/mL   proBrain Natriuretic Peptide, NT    Collection Time: 06/21/21  4:15 AM   Result Value Ref Range    NT-proBNP 188  (H) 0 - 125 pg/mL   LACTIC ACID    Collection Time: 06/21/21  4:15 AM   Result Value Ref Range    Lactic Acid 14.0 (HH) 0.4 - 2.0 mmol/L   PROTHROMBIN TIME    Collection Time: 06/21/21  4:15 AM   Result Value Ref Range    PT 10.1 9.3 - 11.7 sec    INR 0.95    APTT    Collection Time: 06/21/21  4:15 AM   Result Value Ref Range    APTT 21.0 (L) 22.8 - 31.5 sec   ESTIMATED GFR    Collection Time: 06/21/21  4:15 AM   Result Value Ref Range    GFR If African American >60 >60 mL/min/1.73 m 2    GFR If Non  56 (A) >60 mL/min/1.73 m 2   DIAGNOSTIC ALCOHOL    Collection Time: 06/21/21  4:15 AM   Result Value Ref Range    Ethyl Alcohol -Ethanol  Etoh 90 (H) 0 - 10 mg/dL   RESPIRATORY PANEL BY PCR    Collection Time: 06/21/21  4:25 AM   Result Value Ref Range    Adenovirus, PCR Not Detected Not Detected    Coronavirus 229E, PCR Not Detected Not Detected    Coronavirus HKU1, PCR Not Detected Not Detected    Coronavirus NL63, PCR Not Detected Not Detected    Coronavirus OC43, PCR Not Detected Not Detected    SARS-CoV-2 (COVID-19) RNA by RON Not Detected Not Detected    Influenza virus A RNA Not Detected Not Detected    Influenza virus A H1 RNA Not Detected Not Detected    Influenza virus A H3 RNA Not Detected Not Detected    Influenza A 2009, H1N1, PCR Not Detected Not Detected    Influenza virus B, PCR Not Detected Not Detected    Human Metapneumovirus, PCR Not Detected Not Detected    Rhinovirus / Enterovirus, PCR Not Detected Not Detected    Parainfluenza virus 1, PCR Not Detected Not Detected    Parainfluenza virus 2, PCR Not Detected Not Detected    Parainfluenza virus 3, PCR Not Detected Not Detected    Parainfluenza 4, PCR Not Detected Not Detected    RSV, PCR Not Detected Not Detected    B. pertussis, PCR Not Detected Not Detected    B. parapertussis, PCR Not Detected Not Detected    Chlamydia pneumoniae, PCR Not Detected Not Detected    Mycoplasma pneumoniae, PCR Not Detected Not Detected   COVID/SARS  CoV-2 PCR    Collection Time: 21  4:25 AM   Result Value Ref Range    COVID Order Status Received    Urinalysis    Collection Time: 21  4:45 AM    Specimen: Urine   Result Value Ref Range    Color YELLOW     Character Cloudy (A)     Specific Gravity >=1.030 (A) 1.003 - 1.030    Ph 6.0 5.0 - 8.0    Glucose 100 Negative mg/dL    Ketones 15 (A) Negative mg/dL    Protein >=300 (A) Negative mg/dL    Bilirubin NEGATIVE Negative    Urobilinogen, Urine 0.2 0.2 - 1.0 mg/dL    Nitrite NEGATIVE Negative    Leukocyte Esterase NEGATIVE Negative    Occult Blood LARGE (A) Negative   URINE MICROSCOPIC (W/UA)    Collection Time: 21  4:45 AM   Result Value Ref Range    WBC 0-2 (A) 0 - 6 /hpf    RBC 6-10 (A) 0 - 3 /hpf    Bacteria Rare (A) None /hpf    Epithelial Cells Rare (A) None /hpf    Amorphous Crystal 3+ /hpf   EKG    Collection Time: 21  6:25 AM   Result Value Ref Range    Report       [facility] Emergency Dept.    Test Date:  2021  Pt Name:    MARY JANG                   Department: ER  MRN:        [id number]                      Room:       [facility]  Gender:     Male                         Technician: [id number]  :        1960                   Requested By:ANA POMPA  Order #:    [id number]                    Reading MD:    Measurements  Intervals                                Axis  Rate:       106                          P:          103  WY:         176                          QRS:        -19  QRSD:       82                           T:          95  QT:         348  QTc:        463    Interpretive Statements  SINUS TACHYCARDIA  BORDERLINE LEFT AXIS DEVIATION  BORDERLINE REPOLARIZATION ABNORMALITY  No previous ECG available for comparison     CBC w/ Differential    Collection Time: 21  6:26 AM   Result Value Ref Range    WBC 15.4 (H) 4.8 - 10.8 K/uL    RBC 4.21 (L) 4.70 - 6.10 M/uL    Hemoglobin 13.7 (L) 14.0 - 18.0 g/dL    Hematocrit 41.1 (L) 42.0 - 52.0 %     MCV 97.6 81.4 - 97.8 fL    MCH 32.5 27.0 - 33.0 pg    MCHC 33.3 (L) 33.7 - 35.3 g/dL    RDW 50.5 (H) 35.9 - 50.0 fL    Platelet Count 240 164 - 446 K/uL    MPV 9.8 9.0 - 12.9 fL    Neutrophils-Polys 85.90 (H) 44.00 - 72.00 %    Lymphocytes 6.90 (L) 22.00 - 41.00 %    Monocytes 5.80 0.00 - 13.40 %    Eosinophils 0.10 0.00 - 6.90 %    Basophils 0.30 0.00 - 1.80 %    Immature Granulocytes 1.00 (H) 0.00 - 0.90 %    Nucleated RBC 0.00 /100 WBC    Neutrophils (Absolute) 13.25 (H) 1.82 - 7.42 K/uL    Lymphs (Absolute) 1.07 1.00 - 4.80 K/uL    Monos (Absolute) 0.89 (H) 0.00 - 0.85 K/uL    Eos (Absolute) 0.02 0.00 - 0.51 K/uL    Baso (Absolute) 0.05 0.00 - 0.12 K/uL    Immature Granulocytes (abs) 0.15 (H) 0.00 - 0.11 K/uL    NRBC (Absolute) 0.00 K/uL   URINE DRUG SCREEN    Collection Time: 06/21/21  7:00 AM   Result Value Ref Range    Amphetamines Urine Negative Negative    Barbiturates Negative Negative    Benzodiazepines Positive (A) Negative    Cocaine Metabolite Negative Negative    Methadone Negative Negative    Opiates Negative Negative    Oxycodone Negative Negative    Phencyclidine -Pcp Negative Negative    Propoxyphene Negative Negative    Cannabinoid Metab Positive (A) Negative   EC-ECHOCARDIOGRAM COMPLETE W/O CONT    Collection Time: 06/21/21  7:59 AM   Result Value Ref Range    Eject.Frac. MOD BP 34.22     Eject.Frac. MOD 4C 40.19     Eject.Frac. MOD 2C 24.26     Left Ventrical Ejection Fraction 35    Comp Metabolic Panel    Collection Time: 06/21/21  8:15 AM   Result Value Ref Range    Sodium 142 135 - 145 mmol/L    Potassium 4.5 3.6 - 5.5 mmol/L    Chloride 112 96 - 112 mmol/L    Co2 20 20 - 33 mmol/L    Anion Gap 10.0 7.0 - 16.0    Glucose 104 (H) 65 - 99 mg/dL    Bun 12 8 - 22 mg/dL    Creatinine 0.83 0.50 - 1.40 mg/dL    Calcium 8.2 (L) 8.5 - 10.5 mg/dL    AST(SGOT) 54 (H) 12 - 45 U/L    ALT(SGPT) 36 2 - 50 U/L    Alkaline Phosphatase 63 30 - 99 U/L    Total Bilirubin 0.3 0.1 - 1.5 mg/dL    Albumin 3.3 3.2 -  4.9 g/dL    Total Protein 6.1 6.0 - 8.2 g/dL    Globulin 2.8 1.9 - 3.5 g/dL    A-G Ratio 1.2 g/dL   TROPONIN    Collection Time: 06/21/21  8:15 AM   Result Value Ref Range    Troponin T 230 (H) 6 - 19 ng/L   proBrain Natriuretic Peptide, NT    Collection Time: 06/21/21  8:15 AM   Result Value Ref Range    NT-proBNP 419 (H) 0 - 125 pg/mL   MAGNESIUM    Collection Time: 06/21/21  8:15 AM   Result Value Ref Range    Magnesium 1.8 1.5 - 2.5 mg/dL   PHOSPHORUS    Collection Time: 06/21/21  8:15 AM   Result Value Ref Range    Phosphorus 2.9 2.5 - 4.5 mg/dL   aPTT    Collection Time: 06/21/21  8:15 AM   Result Value Ref Range    APTT 25.2 24.7 - 36.0 sec   Prothrombin Time    Collection Time: 06/21/21  8:15 AM   Result Value Ref Range    PT 13.8 12.0 - 14.6 sec    INR 1.09 0.87 - 1.13   Heparin Xa (Unfractionated)    Collection Time: 06/21/21  8:15 AM   Result Value Ref Range    Heparin Xa (UFH) <0.10 IU/mL   Lipid Profile    Collection Time: 06/21/21  8:15 AM   Result Value Ref Range    Cholesterol,Tot 151 100 - 199 mg/dL    Triglycerides 155 (H) 0 - 149 mg/dL    HDL 33 (A) >=40 mg/dL    LDL 87 <100 mg/dL   ESTIMATED GFR    Collection Time: 06/21/21  8:15 AM   Result Value Ref Range    GFR If African American >60 >60 mL/min/1.73 m 2    GFR If Non African American >60 >60 mL/min/1.73 m 2       Imaging  EC-ECHOCARDIOGRAM COMPLETE W/O CONT   Final Result      OUTSIDE IMAGES-CT CERVICAL SPINE   Final Result      OUTSIDE IMAGES-CT HEAD   Final Result      OUTSIDE IMAGES-DX CHEST   Final Result      DX-CHEST-PORTABLE (1 VIEW)   Final Result         Intubated.      Patchy bilateral lower lung opacities, atelectasis or infection.      Mildly prominent cardiopericardial silhouette.       *Personally reviewed chest x-ray showing low lung volumes with enlarged cardiac silhouette and mild edema pattern, endotracheal tube and gastric tube are in good position   *Personally reviewed EKG showing sinus tachycardia with a rate of 106 with  left axis deviation, low voltage, QTc interval 463    Assessment/Plan  * Cardiac arrest (HCC)  Assessment & Plan  Out of hospital arrest with unknown rhythm although likely V. fib/V. tach given AED defibrillation  Cardiology consulted, defer antiarrhythmics to cardiology  Antiplatelet, continue heparin drip  Stat echocardiography  Possible ischemic work-up, trend troponin, as needed EKG  Continuous telemetry monitoring  Supportive care, not a TTM candidate given intact neurologic status    Acute encephalopathy  Assessment & Plan  Secondary to arrest versus alcohol intoxication?  Limit sedatives with close neurologic monitoring  Seizure and aspiration precautions    Acute respiratory failure with hypoxia ([facility])  Assessment & Plan  Intubated at outside hospital 6/21  Continue full mechanical ventilatory support titrating FiO2 to goal SPO2 greater than 92%  RT/O2 protocol  Limit sedatives  Begin spontaneous breathing trial and sedation vacation with hopeful extubation in the next 12 to 24 hours  Forced diuresis when able given signs of developing pulmonary edema    Alcohol intoxication ([facility])  Assessment & Plan  Unknown severity of alcohol use  Rally bag/vitamin supplementation  Monitor for alcohol withdrawal syndrome  Obtain additional history    Hyperglycemia  Assessment & Plan  Insulin sliding scale  Check hemoglobin A1c    WEN (acute kidney injury) ([facility])  Assessment & Plan  Likely component of ATN in the setting of cardiac arrest  Avoid nephrotoxins  Monitor creatinine, urine output, electrolytes closely    Leukemoid reaction  Assessment & Plan  Likely reactive, doubt infection  Monitor off antibiotics for now    Hypomagnesemia  Assessment & Plan  Repletion and monitor closely with goal serum level greater than 2    Hypernatremia  Assessment & Plan  With unclear volume status  Monitor serum sodium levels and fluid management closely      Discussed patient condition and risk of morbidity and/or mortality with  "Hospitalist, RN, RT, Charge nurse / hot rounds, Patient, cardiology and Emergency physician.    The patient remains critically ill.  Critical care time = 45 minutes in directly providing and coordinating critical care and extensive data review.  No time overlap and excludes procedures.    Addendum 1600: Now the patient is extubated, he is able to tell us that he has a history of a prior stroke with resultant left-sided weakness.  He recalls going to the [employer] to drink alcohol which he does not do on a daily basis but does not recall any further events.  He states \"I might have had some chest pain however.\"  He reports noncompliance with medications after losing his Medicaid.  He lives alone with his cats and recently lost his wife and brother.  He denies any history of heart disease that he knows of but also cannot report past medical history other than his stroke.  His brother and sister are aware that he does not take medications but do not know what he should be on nor what pharmacy he uses.  "

## 2021-06-21 NOTE — DISCHARGE PLANNING
Care Transition Team Discharge Planning    Anticipated Discharge Disposition: TBD    Action: Lsw received VM from pt's ex-wife, Nga. She states she remembered one of pt's brother's names: Kam Huerta. There is no Kam Huerta on the [employer] search.    LSw sent an email to  to request further [employer] search for Kam Huerta under pt's information.     No family members named Kam.    Later, Lsw spoke to charge RN. She spoke to pt's sister nano law, Tatyana. Tatyana will go to pt's apartment and feed his cats. Pt's ayse short w/ Sarai Meléndez will give Tatyana the narvaez. RN found this contact on pt's cell phone.    Barriers to Discharge: TBD    Plan: Lsw will continue to follow, and assist w/ d/c planning.

## 2021-06-21 NOTE — PROGRESS NOTES
TOA: 0940, VSS, gtts checked, came up from ED with 75 fentanyl, heparin gtt, 50 propofol. OGT, 200 UOP, 131/93 HR 75 98 O2, APV/+8/55%, bilat wrist restraints.    Belongings: socks, shoes, scott, ID, glasses, pack of cigarretes, hat - placed in closet.   Skin check with Kuldip YBARRA. Pink coccyx, bruise on abdomen, right eye swollen, past surgical scars on R knee, dry/intact.     No family at bedside.     1435: extubated to 4L NC, 20 sec apneic period then another 5 sec apnea period.     1452: 0.1 mg of narcan given     1730: spoke with Aurelio SOLIS regarding trop orders, continue serial trop q4h, NPO at midnight.       Patient confused and needs frequent re-orientation, has short-term memory loss. He has has a previous CVA in the past with left side deficits. Was able to speak with Kam (brother) and Tatyana (sister in law) about patient's situation. Unable to get pharmacy or medication information from patient or family.

## 2021-06-21 NOTE — ASSESSMENT & PLAN NOTE
WBC returned to normal  Likely reactive, doubt infection  Continue to monitor off antibiotics for now

## 2021-06-21 NOTE — RESPIRATORY CARE
Pt extubated per MD request to 4L NC no stridor noted. Pt experienced several seconds of apnea post extubation requiring sternal rub to stimulate respirations. Pt confused, RN present, MD notified

## 2021-06-21 NOTE — ASSESSMENT & PLAN NOTE
Out of hospital arrest with unknown rhythm although likely V. fib/V. tach given AED defibrillation  Cardiology consulted, defer antiarrhythmics to cardiology  Optimize electrolytes  Antiplatelet, continue heparin drip  Echo noted  Left heart catheterization with multivessel disease, medical therapy per [facility]  Continuous telemetry monitoring without significant ectopy  Supportive care, not a TTM candidate given intact neurologic status  Hemodynamically, neurologically after respiratory standpoint did well, extubated afternoon day of admission/arrest

## 2021-06-21 NOTE — ED NOTES
Received report from TATE [place]. Assumed care of patient. Pt resting in bed, comfortable. Intubated. Vitals stable. Will continue to monitor.

## 2021-06-21 NOTE — PROGRESS NOTES
Please see consult note from today for further details.     -Echocardiogram showed LVEF 35-40% with hypokinesis of mid to apical inferior wall.   -EKG shows sinus rhythm with borderline ST depression in lateral leads, no ST elevation.  No events thus far on telemetry  -Will attempt to find family member information to discuss plans  -The patient remains intubated, will plan for extubation.  Will plan on left heart cath this admission.     Lauren Carey M.D.

## 2021-06-21 NOTE — ASSESSMENT & PLAN NOTE
Patient found with multivessel coronary disease  Continue telemonitoring  Cardiology consulting  Optimization of vascular compromise underway  6/24: EP recommending ICD placement after revascularization.  6/26: Planning for high risk PCI on Monday.  Planning for ICD placement after revascularization.  6/27: Plan for high risk PCI tomorrow.  This will be followed by ICD placement on different day.  6/29-plan for ICD tomorrow.  Orders per cardiology  6/20- plan for ICD today.  Medication titration per cardiology.  ~On Lasix IV.  Transition to oral once okay per cardiology. patient will be discharged once cleared by cardiology

## 2021-06-21 NOTE — ASSESSMENT & PLAN NOTE
Unknown severity of alcohol use, has not exhibited significant withdrawals  Rally bag/vitamin supplementation  Monitor for alcohol withdrawal syndrome, no so far  Optimize electrolytes

## 2021-06-21 NOTE — ED PROVIDER NOTES
"ED Provider Note    CHIEF COMPLAINT  Chief Complaint   Patient presents with   • Cardiac Arrest     [facility] [place] [facility]  PT was shocked x 1 by AED and transported to ED with pulses.       HPI  Yasmany Huerta is a 60 y.o. male who presents in transfer from an outside facility [place].  Patient was [place] with an unknown downtime.  He was pulseless at that time and bystander CPR was started.  AED was located and placed on the patient.  1 shock was given prior to arrival of EMS.  After 2 rounds of CPR patient had return of spontaneous circulation.  Patient was transported to the [facility] with an OPA in place which was apparently pulled out by the patient himself in route.  He was able to tell medics his name but appeared confused and disoriented.  Patient was emergently sedated and intubated on arrival at [facility] to facilitate the work-up.  He was found to have an elevated troponin and elevated sodium level and was transferred for further evaluation, cardiology consultation and ICU placement.    REVIEW OF SYSTEMS  Unable to obtain due to clinical condition      PAST MEDICAL HISTORY   Unknown    SOCIAL HISTORY  Unknown    SURGICAL HISTORY  Unknown  CURRENT MEDICATIONS  Home Medications    **Home medications have not yet been reviewed for this encounter**         ALLERGIES  Unknown    PHYSICAL EXAM  VITAL SIGNS: /87   Pulse (!) 109   Temp 36.8 °C (98.2 °F) (Temporal)   Resp 19   Ht 1.778 m (5' 10\")   Wt 116 kg (255 lb 11.7 oz)   SpO2 98%   BMI 36.69 kg/m²    Gen: Sedated, intubated  HEENT: No signs of trauma, Bilateral external ears normal, Nose normal. Conjunctiva normal, Non-icteric.  Patient opens eyes slightly to voice, pupils 2 mm equal bilaterally.  ET tube in place.  Neck:  No JVD, Supple, No masses  Lymphatic: No cervical lymphadenopathy noted.   Cardiovascular: " Mild tachycardia with regular rhythm.  Capillary refill less than 3 seconds to all extremities, 2+ distal pulses.  Bilateral lower extremity edema in the pretibial and ankle regions.  Thorax & Lungs: Normal breath sounds, No wheezing bilateral chest rise  Abdomen: Bowel sounds normal, Soft,  No masses, No pulsatile masses.   Skin: Warm, Dry, No erythema  Extremities: Intact distal pulses, no lesions or deformities  Neurologic: GCS 5        LABS  Results for orders placed or performed during the hospital encounter of 06/21/21   CBC w/ Differential   Result Value Ref Range    WBC 15.4 (H) 4.8 - 10.8 K/uL    RBC 4.21 (L) 4.70 - 6.10 M/uL    Hemoglobin 13.7 (L) 14.0 - 18.0 g/dL    Hematocrit 41.1 (L) 42.0 - 52.0 %    MCV 97.6 81.4 - 97.8 fL    MCH 32.5 27.0 - 33.0 pg    MCHC 33.3 (L) 33.7 - 35.3 g/dL    RDW 50.5 (H) 35.9 - 50.0 fL    Platelet Count 240 164 - 446 K/uL    MPV 9.8 9.0 - 12.9 fL    Neutrophils-Polys 85.90 (H) 44.00 - 72.00 %    Lymphocytes 6.90 (L) 22.00 - 41.00 %    Monocytes 5.80 0.00 - 13.40 %    Eosinophils 0.10 0.00 - 6.90 %    Basophils 0.30 0.00 - 1.80 %    Immature Granulocytes 1.00 (H) 0.00 - 0.90 %    Nucleated RBC 0.00 /100 WBC    Neutrophils (Absolute) 13.25 (H) 1.82 - 7.42 K/uL    Lymphs (Absolute) 1.07 1.00 - 4.80 K/uL    Monos (Absolute) 0.89 (H) 0.00 - 0.85 K/uL    Eos (Absolute) 0.02 0.00 - 0.51 K/uL    Baso (Absolute) 0.05 0.00 - 0.12 K/uL    Immature Granulocytes (abs) 0.15 (H) 0.00 - 0.11 K/uL    NRBC (Absolute) 0.00 K/uL   URINE DRUG SCREEN   Result Value Ref Range    Amphetamines Urine Negative Negative    Barbiturates Negative Negative    Benzodiazepines Positive (A) Negative    Cocaine Metabolite Negative Negative    Methadone Negative Negative    Opiates Negative Negative    Oxycodone Negative Negative    Phencyclidine -Pcp Negative Negative    Propoxyphene Negative Negative    Cannabinoid Metab Positive (A) Negative   EKG   Result Value Ref Range    Report       [facility] Emergency Dept.    Test Date:  2021  Pt Name:    MARY JANG                   Department: ER  MRN:        [id number]                      Room:       RD 11  Gender:     Male                         Technician: [id number]  :        1960                   Requested By:ANA POMPA  Order #:    [id number]                    Reading MD:    Measurements  Intervals                                Axis  Rate:       106                          P:          103  MS:         176                          QRS:        -19  QRSD:       82                           T:          95  QT:         348  QTc:        463    Interpretive Statements  SINUS TACHYCARDIA  BORDERLINE LEFT AXIS DEVIATION  BORDERLINE REPOLARIZATION ABNORMALITY  No previous ECG available for comparison         RADIOLOGY  OUTSIDE IMAGES-CT CERVICAL SPINE   Final Result      OUTSIDE IMAGES-CT HEAD   Final Result      OUTSIDE IMAGES-DX CHEST   Final Result      DX-CHEST-PORTABLE (1 VIEW)   Final Result         Intubated.      Patchy bilateral lower lung opacities, atelectasis or infection.      Mildly prominent cardiopericardial silhouette.      EC-ECHOCARDIOGRAM COMPLETE W/O CONT    (Results Pending)     Critical Care Note  Upon my evaluation, this patient had high probability of imminent and life-threatening deterioration due to cardiac arrest and respiratory failure, which required my direct attention, intervention, and personal management. I personally provided 35 minutes of critical care time exclusive of time spent on separately billable procedures. Time includes review of laboratory data, radiology results, discussion with consultants, and monitoring for potential decompensation.     HYDRATION: Based on the patient's presentation of Inability to take oral fluids the patient was given IV fluids. IV Hydration was used because oral hydration was not adequate alone. Upon recheck following hydration, the patient was stable.    COURSE &  MEDICAL DECISION MAKING  Patient arrives in transfer from outside facility after cardiac arrest and subsequent return of spontaneous circulation  Given that shock was indicated on the initial AED, is likely this is a V. tach or V. fib arrest.  Case was briefly discussed with Dr. Albright, cardiology, who evaluated patient in the emergency department and noted that he will likely go to the catheterization lab and is also very likely to get an AICD implanted at some point.  We will continue heparinization for now and admit to the intensive care unit.  Bedside echo was ordered.  We will plan on contacting hospitalist as well as intensivist for admission.    FINAL IMPRESSION  1. Cardiac arrest with successful resuscitation (HCC)    2. Hypernatremia    3. Acute respiratory failure, unspecified whether with hypoxia or hypercapnia (HCC)        Electronically signed by: Eris Serna M.D., 6/21/2021 6:20 AM

## 2021-06-21 NOTE — ASSESSMENT & PLAN NOTE
Intubated at outside hospital 6/21  Extubated afternoon 6/21  RT/O2 protocol  Limit sedatives  Incentive spirometry, mobilize when okay with cardiology likely post heart catheterization, may need PT/OT  Forced diuresis when able given signs of developing pulmonary edema  Chest x-ray as needed

## 2021-06-21 NOTE — CONSULTS
Consults   Cardiology Initial Consultation    Date of Service  6/21/2021    Referring Physician  Eris Serna M.D.    Reason for Consultation  Cardiac arrest    History of Presenting Illness  Yasmany Huerta is a 60 y.o. male admitted 6/21/2021 as transfer from [facility] for cardiac arrest.    Details obtained from the chart.  Patient was brought into [facility] after [place] without a [place].  Law enforcement arrived first and placed an AED which recommended a shock.  Patient was shocked once and had 2 rounds of CPR and regained pulses.  Upon arrival to [facility], he was combative but was able to share his name.  He ended up intubated for airway protection and transferred to [facility].    He is currently intubated.  ECG with small inferior Q waves.  Initial blood pressure 168/100.  Patient noted to have multiple electrolyte abnormalities.  Initial troponin 0 0.49.    Uncertain if patient has any prior medical history or takes any medications.  The rest of the history is unobtainable.    Review of Systems  Review of Systems   Unable to perform ROS: Intubated       All systems reviewed and negative.    Past Medical History   has no past medical history on file.    Surgical History   has no past surgical history on file.    Family History  family history is not on file.  Family history is not obtainable at this time    Social History   reports that he has never smoked. He has never used smokeless tobacco. He reports current alcohol use. He reports that he does not use drugs.    Medications  None       Allergies  No Known Allergies    Vital signs in last 24 hours  Temp:  [36.1 °C (97 °F)-36.8 °C (98.2 °F)] 36.8 °C (98.2 °F)  Pulse:  [] 100  Resp:  [18-22] 18  BP: ()/() 123/78  SpO2:  [69 %-99 %] 94 %    Physical Exam  Physical Exam      General: No acute distress. Well nourished.  Intubated.  HEENT:  No scleral icterus, no  pharyngeal erythema, ET tube in place  Neck:  No JVD at 0, no bruits, trachea midline  CVS: RRR. Normal S1, S2. No M/R/G. No LE edema.  2+ radial pulses, 2+ PT pulses  Resp: Coarse breath sounds throughout.  Respiratory effort per the ventilator.  Abdomen: Soft, NT, no kassie hepatomegaly.  MSK/Ext: No clubbing or cyanosis.  Skin: Warm and dry, no rashes.  Neurological: Deferred, intubated and sedated  Psych: Deferred, intubated and sedated      Lab Review  Lab Results   Component Value Date/Time    WBC 14.7 (H) 06/21/2021 04:15 AM    RBC 4.60 (L) 06/21/2021 04:15 AM    HEMOGLOBIN 14.9 06/21/2021 04:15 AM    HEMATOCRIT 45.8 06/21/2021 04:15 AM    MCV 99.6 (H) 06/21/2021 04:15 AM    MCH 32.4 (H) 06/21/2021 04:15 AM    MCHC 32.5 (L) 06/21/2021 04:15 AM    MPV 9.3 06/21/2021 04:15 AM      Lab Results   Component Value Date/Time    SODIUM 152 (H) 06/21/2021 04:15 AM    POTASSIUM 4.0 06/21/2021 04:15 AM    CHLORIDE 113 (H) 06/21/2021 04:15 AM    CO2 15 (L) 06/21/2021 04:15 AM    GLUCOSE 181 (H) 06/21/2021 04:15 AM    BUN 12 06/21/2021 04:15 AM    CREATININE 1.3 06/21/2021 04:15 AM      Lab Results   Component Value Date/Time    ASTSGOT 52 (H) 06/21/2021 04:15 AM    ALTSGPT 48 06/21/2021 04:15 AM     No results found for: CHOLSTRLTOT, LDL, HDL, TRIGLYCERIDE, TROPONINT    Recent Labs     06/21/21  0415   NTPROBNP 188*       Cardiac Imaging and Procedures Review  EKG:  My personal interpretation of the EKG dated 6/21/2021 is sinus tachycardia, 106, possible old inferior MI, borderline inferior ST changes    Echocardiogram: Pending    Stress Test: N/A    Cardiac Catheterization: N/A    Imaging  DX-CHEST-PORTABLE (1 VIEW)    (Results Pending)   EC-ECHOCARDIOGRAM COMPLETE W/O CONT    (Results Pending)         Assessment/Plan  -Out of hospital cardiac arrest with shock, VT versus V. Fib  -Abnormal ECG, possible old inferior MI  -Acute encephalopathy  -Hypernatremia  -Lactic acidosis  -Abnormal troponin  -ETOH    Plan:  -Stat  echo  -Hep gtt for ACS  -Try to get strip from AED  -LHC this admit and probably secondary prevention ICD  -Urine drug screen  -Lipid panel    Discussed with Dr. Serna      Cardiology will continue to follow along.    Thank you for allowing me to participate in the care of this patient.    Please contact me with any questions.    Renae Albright M.D.   Cardiologist, [facility]  [phone]

## 2021-06-22 ENCOUNTER — APPOINTMENT (OUTPATIENT)
Dept: CARDIOLOGY | Facility: MEDICAL CENTER | Age: 61
DRG: 224 | End: 2021-06-22
Attending: STUDENT IN AN ORGANIZED HEALTH CARE EDUCATION/TRAINING PROGRAM
Payer: MEDICAID

## 2021-06-22 ENCOUNTER — APPOINTMENT (OUTPATIENT)
Dept: RADIOLOGY | Facility: MEDICAL CENTER | Age: 61
DRG: 224 | End: 2021-06-22
Attending: INTERNAL MEDICINE
Payer: MEDICAID

## 2021-06-22 LAB
ANION GAP SERPL CALC-SCNC: 9 MMOL/L (ref 7–16)
BASOPHILS # BLD AUTO: 0.1 % (ref 0–1.8)
BASOPHILS # BLD: 0.01 K/UL (ref 0–0.12)
BUN SERPL-MCNC: 9 MG/DL (ref 8–22)
CALCIUM SERPL-MCNC: 7.8 MG/DL (ref 8.5–10.5)
CHLORIDE SERPL-SCNC: 106 MMOL/L (ref 96–112)
CO2 SERPL-SCNC: 23 MMOL/L (ref 20–33)
CREAT SERPL-MCNC: 0.74 MG/DL (ref 0.5–1.4)
EOSINOPHIL # BLD AUTO: 0.07 K/UL (ref 0–0.51)
EOSINOPHIL NFR BLD: 0.6 % (ref 0–6.9)
ERYTHROCYTE [DISTWIDTH] IN BLOOD BY AUTOMATED COUNT: 49.9 FL (ref 35.9–50)
GLUCOSE BLD-MCNC: 104 MG/DL (ref 65–99)
GLUCOSE BLD-MCNC: 108 MG/DL (ref 65–99)
GLUCOSE BLD-MCNC: 115 MG/DL (ref 65–99)
GLUCOSE SERPL-MCNC: 111 MG/DL (ref 65–99)
HCT VFR BLD AUTO: 40.1 % (ref 42–52)
HGB BLD-MCNC: 13.1 G/DL (ref 14–18)
IMM GRANULOCYTES # BLD AUTO: 0.07 K/UL (ref 0–0.11)
IMM GRANULOCYTES NFR BLD AUTO: 0.6 % (ref 0–0.9)
LYMPHOCYTES # BLD AUTO: 1.04 K/UL (ref 1–4.8)
LYMPHOCYTES NFR BLD: 9.4 % (ref 22–41)
MAGNESIUM SERPL-MCNC: 2.1 MG/DL (ref 1.5–2.5)
MCH RBC QN AUTO: 31.9 PG (ref 27–33)
MCHC RBC AUTO-ENTMCNC: 32.7 G/DL (ref 33.7–35.3)
MCV RBC AUTO: 97.6 FL (ref 81.4–97.8)
MONOCYTES # BLD AUTO: 0.8 K/UL (ref 0–0.85)
MONOCYTES NFR BLD AUTO: 7.3 % (ref 0–13.4)
NEUTROPHILS # BLD AUTO: 9.03 K/UL (ref 1.82–7.42)
NEUTROPHILS NFR BLD: 82 % (ref 44–72)
NRBC # BLD AUTO: 0 K/UL
NRBC BLD-RTO: 0 /100 WBC
PHOSPHATE SERPL-MCNC: 2.5 MG/DL (ref 2.5–4.5)
PLATELET # BLD AUTO: 197 K/UL (ref 164–446)
PMV BLD AUTO: 10.1 FL (ref 9–12.9)
POTASSIUM SERPL-SCNC: 5.3 MMOL/L (ref 3.6–5.5)
RBC # BLD AUTO: 4.11 M/UL (ref 4.7–6.1)
SODIUM SERPL-SCNC: 138 MMOL/L (ref 135–145)
TROPONIN T SERPL-MCNC: 569 NG/L (ref 6–19)
UFH PPP CHRO-ACNC: 0.18 IU/ML
UFH PPP CHRO-ACNC: 0.32 IU/ML
UFH PPP CHRO-ACNC: 0.45 IU/ML
WBC # BLD AUTO: 11 K/UL (ref 4.8–10.8)

## 2021-06-22 PROCEDURE — 700102 HCHG RX REV CODE 250 W/ 637 OVERRIDE(OP): Performed by: INTERNAL MEDICINE

## 2021-06-22 PROCEDURE — 700111 HCHG RX REV CODE 636 W/ 250 OVERRIDE (IP): Performed by: HOSPITALIST

## 2021-06-22 PROCEDURE — 700111 HCHG RX REV CODE 636 W/ 250 OVERRIDE (IP)

## 2021-06-22 PROCEDURE — 99152 MOD SED SAME PHYS/QHP 5/>YRS: CPT | Performed by: INTERNAL MEDICINE

## 2021-06-22 PROCEDURE — 99233 SBSQ HOSP IP/OBS HIGH 50: CPT | Performed by: INTERNAL MEDICINE

## 2021-06-22 PROCEDURE — 700101 HCHG RX REV CODE 250

## 2021-06-22 PROCEDURE — 84100 ASSAY OF PHOSPHORUS: CPT

## 2021-06-22 PROCEDURE — 700101 HCHG RX REV CODE 250: Performed by: INTERNAL MEDICINE

## 2021-06-22 PROCEDURE — 99232 SBSQ HOSP IP/OBS MODERATE 35: CPT | Mod: 25 | Performed by: STUDENT IN AN ORGANIZED HEALTH CARE EDUCATION/TRAINING PROGRAM

## 2021-06-22 PROCEDURE — 83735 ASSAY OF MAGNESIUM: CPT

## 2021-06-22 PROCEDURE — 700102 HCHG RX REV CODE 250 W/ 637 OVERRIDE(OP): Performed by: NURSE PRACTITIONER

## 2021-06-22 PROCEDURE — 700102 HCHG RX REV CODE 250 W/ 637 OVERRIDE(OP): Performed by: HOSPITALIST

## 2021-06-22 PROCEDURE — 770022 HCHG ROOM/CARE - ICU (200)

## 2021-06-22 PROCEDURE — 93458 L HRT ARTERY/VENTRICLE ANGIO: CPT | Mod: 26 | Performed by: INTERNAL MEDICINE

## 2021-06-22 PROCEDURE — 99153 MOD SED SAME PHYS/QHP EA: CPT

## 2021-06-22 PROCEDURE — B2151ZZ FLUOROSCOPY OF LEFT HEART USING LOW OSMOLAR CONTRAST: ICD-10-PCS | Performed by: INTERNAL MEDICINE

## 2021-06-22 PROCEDURE — 85025 COMPLETE CBC W/AUTO DIFF WBC: CPT

## 2021-06-22 PROCEDURE — 700117 HCHG RX CONTRAST REV CODE 255: Performed by: INTERNAL MEDICINE

## 2021-06-22 PROCEDURE — 4A023N7 MEASUREMENT OF CARDIAC SAMPLING AND PRESSURE, LEFT HEART, PERCUTANEOUS APPROACH: ICD-10-PCS | Performed by: INTERNAL MEDICINE

## 2021-06-22 PROCEDURE — A9270 NON-COVERED ITEM OR SERVICE: HCPCS | Performed by: HOSPITALIST

## 2021-06-22 PROCEDURE — 80048 BASIC METABOLIC PNL TOTAL CA: CPT

## 2021-06-22 PROCEDURE — 99255 IP/OBS CONSLTJ NEW/EST HI 80: CPT | Performed by: THORACIC SURGERY (CARDIOTHORACIC VASCULAR SURGERY)

## 2021-06-22 PROCEDURE — 700105 HCHG RX REV CODE 258: Performed by: HOSPITALIST

## 2021-06-22 PROCEDURE — 71045 X-RAY EXAM CHEST 1 VIEW: CPT

## 2021-06-22 PROCEDURE — A9270 NON-COVERED ITEM OR SERVICE: HCPCS | Performed by: INTERNAL MEDICINE

## 2021-06-22 PROCEDURE — A9270 NON-COVERED ITEM OR SERVICE: HCPCS | Performed by: NURSE PRACTITIONER

## 2021-06-22 PROCEDURE — B2111ZZ FLUOROSCOPY OF MULTIPLE CORONARY ARTERIES USING LOW OSMOLAR CONTRAST: ICD-10-PCS | Performed by: INTERNAL MEDICINE

## 2021-06-22 PROCEDURE — 85520 HEPARIN ASSAY: CPT | Mod: 91

## 2021-06-22 PROCEDURE — 84484 ASSAY OF TROPONIN QUANT: CPT

## 2021-06-22 PROCEDURE — 82962 GLUCOSE BLOOD TEST: CPT

## 2021-06-22 RX ORDER — LIDOCAINE HYDROCHLORIDE 20 MG/ML
INJECTION, SOLUTION INFILTRATION; PERINEURAL
Status: COMPLETED
Start: 2021-06-22 | End: 2021-06-22

## 2021-06-22 RX ORDER — MIDAZOLAM HYDROCHLORIDE 1 MG/ML
INJECTION INTRAMUSCULAR; INTRAVENOUS
Status: DISPENSED
Start: 2021-06-22 | End: 2021-06-23

## 2021-06-22 RX ORDER — HEPARIN SODIUM 200 [USP'U]/100ML
INJECTION, SOLUTION INTRAVENOUS
Status: COMPLETED
Start: 2021-06-22 | End: 2021-06-22

## 2021-06-22 RX ORDER — VERAPAMIL HYDROCHLORIDE 2.5 MG/ML
INJECTION, SOLUTION INTRAVENOUS
Status: COMPLETED
Start: 2021-06-22 | End: 2021-06-22

## 2021-06-22 RX ORDER — LOSARTAN POTASSIUM 25 MG/1
25 TABLET ORAL
Status: DISCONTINUED | OUTPATIENT
Start: 2021-06-22 | End: 2021-06-25

## 2021-06-22 RX ORDER — HEPARIN SODIUM 1000 [USP'U]/ML
INJECTION, SOLUTION INTRAVENOUS; SUBCUTANEOUS
Status: COMPLETED
Start: 2021-06-22 | End: 2021-06-22

## 2021-06-22 RX ORDER — METOPROLOL SUCCINATE 25 MG/1
25 TABLET, EXTENDED RELEASE ORAL
Status: DISCONTINUED | OUTPATIENT
Start: 2021-06-22 | End: 2021-06-25

## 2021-06-22 RX ORDER — HEPARIN SODIUM 200 [USP'U]/100ML
INJECTION, SOLUTION INTRAVENOUS
Status: DISPENSED
Start: 2021-06-22 | End: 2021-06-23

## 2021-06-22 RX ADMIN — LIDOCAINE HYDROCHLORIDE: 20 INJECTION, SOLUTION INFILTRATION; PERINEURAL at 16:29

## 2021-06-22 RX ADMIN — SODIUM CHLORIDE, POTASSIUM CHLORIDE, SODIUM LACTATE AND CALCIUM CHLORIDE 83 ML: 600; 310; 30; 20 INJECTION, SOLUTION INTRAVENOUS at 17:42

## 2021-06-22 RX ADMIN — LIDOCAINE 1 PATCH: 50 PATCH TOPICAL at 17:39

## 2021-06-22 RX ADMIN — METOPROLOL TARTRATE 12.5 MG: 25 TABLET, FILM COATED ORAL at 06:00

## 2021-06-22 RX ADMIN — VERAPAMIL HYDROCHLORIDE 5 MG: 2.5 INJECTION, SOLUTION INTRAVENOUS at 16:30

## 2021-06-22 RX ADMIN — METOPROLOL SUCCINATE 25 MG: 25 TABLET, EXTENDED RELEASE ORAL at 13:46

## 2021-06-22 RX ADMIN — HEPARIN SODIUM: 1000 INJECTION, SOLUTION INTRAVENOUS; SUBCUTANEOUS at 16:29

## 2021-06-22 RX ADMIN — THERA TABS 1 TABLET: TAB at 07:49

## 2021-06-22 RX ADMIN — ACETAMINOPHEN 650 MG: 325 TABLET, FILM COATED ORAL at 21:19

## 2021-06-22 RX ADMIN — IOHEXOL 96 ML: 350 INJECTION, SOLUTION INTRAVENOUS at 16:55

## 2021-06-22 RX ADMIN — DOCUSATE SODIUM 50 MG AND SENNOSIDES 8.6 MG 2 TABLET: 8.6; 5 TABLET, FILM COATED ORAL at 17:41

## 2021-06-22 RX ADMIN — NITROGLYCERIN 10 ML: 20 INJECTION INTRAVENOUS at 16:30

## 2021-06-22 RX ADMIN — ATORVASTATIN CALCIUM 40 MG: 40 TABLET, FILM COATED ORAL at 17:42

## 2021-06-22 RX ADMIN — Medication 100 MG: at 07:49

## 2021-06-22 RX ADMIN — HEPARIN SODIUM 2000 UNITS: 1000 INJECTION, SOLUTION INTRAVENOUS; SUBCUTANEOUS at 04:26

## 2021-06-22 RX ADMIN — LOSARTAN POTASSIUM 25 MG: 25 TABLET, FILM COATED ORAL at 13:46

## 2021-06-22 RX ADMIN — ASPIRIN 325 MG ORAL TABLET 325 MG: 325 PILL ORAL at 05:47

## 2021-06-22 RX ADMIN — HEPARIN SODIUM 17 UNITS/KG/HR: 5000 INJECTION, SOLUTION INTRAVENOUS at 07:08

## 2021-06-22 RX ADMIN — FOLIC ACID 1 MG: 1 TABLET ORAL at 07:49

## 2021-06-22 RX ADMIN — HEPARIN SODIUM 2000 UNITS: 200 INJECTION, SOLUTION INTRAVENOUS at 16:31

## 2021-06-22 ASSESSMENT — ENCOUNTER SYMPTOMS
VOMITING: 0
WEIGHT LOSS: 0
DOUBLE VISION: 0
ABDOMINAL PAIN: 0
PALPITATIONS: 0
SORE THROAT: 0
SHORTNESS OF BREATH: 0
BRUISES/BLEEDS EASILY: 0
COUGH: 0
BLOOD IN STOOL: 0
SPUTUM PRODUCTION: 0
SEIZURES: 0
DIZZINESS: 0
ORTHOPNEA: 0
HEADACHES: 0
INSOMNIA: 1
EYES NEGATIVE: 1
CHILLS: 0
FOCAL WEAKNESS: 0
HEMOPTYSIS: 0
TREMORS: 0
DIARRHEA: 0
FLANK PAIN: 0
FOCAL WEAKNESS: 1
SPEECH CHANGE: 0
FEVER: 0
NERVOUS/ANXIOUS: 0
EYE PAIN: 0
MEMORY LOSS: 1
POLYDIPSIA: 0
NAUSEA: 0
PND: 0
SHORTNESS OF BREATH: 1
HALLUCINATIONS: 0
PHOTOPHOBIA: 0

## 2021-06-22 ASSESSMENT — PAIN DESCRIPTION - PAIN TYPE
TYPE: ACUTE PAIN

## 2021-06-22 ASSESSMENT — FIBROSIS 4 INDEX: FIB4 SCORE: 3.73

## 2021-06-22 NOTE — DISCHARGE PLANNING
Care Transition Team Discharge Planning    Anticipated Discharge Disposition: TBD    Action: Lsw spoke to BS RN. Pt's brother Kam lives in [place] and pt is A/O enough to request the medical team call him.    Pt's other brother, Kyrie, passed away recently, and so did pt's spouse. Kyrie's wife is Tatyana, [phone], who is assisting w/ pt's cats.    BS RN will text this Lsw Kam's number as Kam has been asking to speak w/ LSw.    Lsw called pt's brother Kam at [phone]. Lsw spoke to pt's brother and his sister in law, Sharon Huerta ([phone]). Kam works from home and Sharon is much more accessible via phone. Lsw completed d/c planning assessment and discussed pt's hx of poor medical management. Pt believes he had a stroke 7 years ago, but never sought out medical intervention. The pt now uses a cane, but drags his affected side leg. Sharon believes the paralyzed side is the left side.    Emotionally pt has lost his spouse 1 year ago May and his brother, Kyrie, this year on March 22nd. Pt has not sought out any emotional supports. EUGENE Tatyana is dealing w/ the loss of her spouse, and working. Tatyana is not able to help pt a lot even though she lives 45 mins from pt's house. Both Sharon and Kam live 3 hours from pt's house, but just recently purchased pt an electric scooter to assist him w/ getting round outside.     Pt has very low income and they have helped him pay utility bills in past by calling ROB Melendez @ [phone]. Orin works for an agency locally and last got involved late March early April of this year.    Pt's spouse was disable and they are not sure if the  involved w/ pt's care were for his spouse and not for him. They believe pt has SNAP services d/t very low income. They have attempted to get pt services in [facility] such as a well check program. Pt was concerned about covid and having strangers in his home.    Pt will most likely need full assessments for I/ADLs ability. Pt lives alone, and does  not usually seek out supports in the community when needed.  Pt does have a form of transportation (call and ride-probably [employer].) and uses a bus at times. [facility]  contact for more in home supports near to d/c is phone number [phone]. [employer] for any other low income qualified in home services phone [phone].     Lsw explained pt will be assessed and determined the safest d/c at the time of medical stability clearance. They are aware pt may change floors and have a different d/c planner over time. They have this Lsw's contact information, and know they can call for information/support as needed.      Barriers to Discharge: TBD    Plan: Lsw will continue to follow, and assist w/ d/c planning.

## 2021-06-22 NOTE — PROGRESS NOTES
"       [facility] Cardiology Follow-up Note    Date of Service:    6/22/2021      Name:   Yasmany Huerta   YOB: 1960  Age:   60 y.o.  male   MRN:   [id number]      Chief Complaint: cardiac arrest    Attending Provider: Dr. Jc      HPI:  Yasmany Huerta is a 60 y.o. male admitted 6/21/2021 as transfer from [facility] for cardiac arrest.    Per Dr Albright's consult note, \"Patient was brought into [facility] after [place].  Law enforcement arrived first and placed an AED which recommended a shock.  Patient was shocked once and had 2 rounds of CPR and regained pulses.  Upon arrival to [facility], he was combative but was able to share his name.  He ended up intubated for airway protection and transferred to [facility].\"    Currently he is extubated and slow to answer questions.  Awaiting cath for today, may need to have family member consent.     Interim Events:  6/22/21  - Slow to answer questions, but A&O x3, disoriented to time  - Personal Telemetry interpretation: SR 70-90's, f PVCs overnight  - Extubated yesterday, 3L NC  - Vital signs: -150's, HR 70-90's  - labs reviewed: recent trop 569  - Heparin drip continues    ROS  Constitutional: + fatigue.  Respiratory:  Denies shortness of breath, + cough.  Cardiovascular: + chest pain with inspiration. denies lower extremity edema.  Denies orthopnea or PND.  : polyuria, no dysuria.  GI:  Denies nausea/vomiting.  No abdominal distention.  Neuro:  Denies dizziness, syncope.  Hem/lymph: Denies easy bleeding/bruising.      All other review of systems reviewed and negative.    Past medical, surgical, social, and family history reviewed and unchanged from admission except as noted in HPI.    Medications: Reviewed in MAR  Current Facility-Administered Medications   Medication Dose Frequency Provider Last Rate Last Admin   • Respiratory Therapy Consult   Continuous RT " Jeremy M Gonda, M.D.       • famotidine (PEPCID) tablet 20 mg  20 mg Q12HRS Jeremy M Gonda, M.D.        Or   • famotidine (PEPCID) injection 20 mg  20 mg Q12HRS Jeremy M Gonda, M.D.   20 mg at 06/21/21 1722   • senna-docusate (PERICOLACE or SENOKOT S) 8.6-50 MG per tablet 2 tablet  2 tablet BID Jeremy M Gonda, M.D.   2 tablet at 06/21/21 1722    And   • polyethylene glycol/lytes (MIRALAX) PACKET 1 Packet  1 Packet QDAY PRN Jeremy M Gonda, M.D.        And   • magnesium hydroxide (MILK OF MAGNESIA) suspension 30 mL  30 mL QDAY PRN Jeremy M Gonda, M.D.        And   • bisacodyl (DULCOLAX) suppository 10 mg  10 mg QDAY PRN Jeremy M Gonda, M.D.       • MD Alert...ICU Electrolyte Replacement per Pharmacy   PHARMACY TO DOSE Jeremy M Gonda, M.D.       • lidocaine (XYLOCAINE) 1 % injection 2 mL  2 mL Q30 MIN PRN Jeremy M Gonda, M.D.       • fentaNYL (SUBLIMAZE) injection 100 mcg  100 mcg Q15 MIN PRN Jeremy M Gonda, M.D.        And   • fentaNYL (SUBLIMAZE) injection 200 mcg  200 mcg Q15 MIN PRN Jeremy M Gonda, M.D.        And   • fentaNYL (SUBLIMAZE) 50 mcg/mL in 50mL (Continuous Infusion)   Continuous Jeremy M Gonda, M.D.   Stopped at 06/21/21 1345    And   • propofol (DIPRIVAN) injection  0-80 mcg/kg/min Continuous Jeremy M Gonda, M.D.   Stopped at 06/21/21 1345   • lactated ringers infusion   Continuous LIZETTE Alamo.OKecia 83 mL/hr at 06/21/21 1233 New Bag at 06/21/21 1233   • acetaminophen (Tylenol) tablet 650 mg  650 mg Q6HRS PRN LIZETTE Alamo.O.   650 mg at 06/21/21 2043   • aspirin (ASA) tablet 325 mg  325 mg DAILY CRISSY AlamoOKecia   325 mg at 06/22/21 0547    Or   • aspirin (ASA) chewable tab 324 mg  324 mg DAILY Adam Chambers D.O.        Or   • aspirin (ASA) suppository 300 mg  300 mg DAILY Adam Chambers D.O.       • heparin infusion 25,000 units in 500 mL 0.45% NACL  0-30 Units/kg/hr (Adjusted) Continuous Adam Chambers D.O. 30.7 mL/hr at 06/22/21 0708 17  "Units/kg/hr at 06/22/21 0708   • heparin injection 2,000 Units  2,000 Units PRN Adam Chambers D.O.   2,000 Units at 06/22/21 0426   • metoprolol tartrate (LOPRESSOR) tablet 12.5 mg  12.5 mg TWICE DAILY LIZETTE Alamo.O.   12.5 mg at 06/22/21 0600   • atorvastatin (LIPITOR) tablet 40 mg  40 mg Q EVENING Adam Chambers D.O.   40 mg at 06/21/21 1722   • ondansetron (ZOFRAN) syringe/vial injection 4 mg  4 mg Q4HRS PRN Adam Chambers D.O.       • ondansetron (ZOFRAN ODT) dispertab 4 mg  4 mg Q4HRS PRN Adam Chambers D.O.       • promethazine (PHENERGAN) tablet 12.5-25 mg  12.5-25 mg Q4HRS PRN LIZETTE Alamo.O.       • promethazine (PHENERGAN) suppository 12.5-25 mg  12.5-25 mg Q4HRS PRN Adam Chambers D.O.       • prochlorperazine (COMPAZINE) injection 5-10 mg  5-10 mg Q4HRS PRN Adam Chambers D.O.       • insulin regular (HumuLIN R,NovoLIN R) injection  3-18 Units 4X/DAY ACHAMRITA Chambers D.O.   3 Units at 06/21/21 1311   • thiamine (Vitamin B-1) tablet 100 mg  100 mg DAILY Jeremy M Gonda, M.D.        And   • folic acid (FOLVITE) tablet 1 mg  1 mg DAILY Jeremy M Gonda, M.D.        And   • multivitamin (THERAGRAN) tablet 1 tablet  1 tablet DAILY Jeremy M Gonda, M.D.       • naloxone (NARCAN) injection 0.1 mg  0.1 mg Once Jeremy M Gonda, M.D.       • lidocaine (LIDODERM) 5 % 1 Patch  1 Patch Q24HR Jeremy M Gonda, M.D.   1 Patch at 06/21/21 1647   • labetalol (NORMODYNE/TRANDATE) injection 10-20 mg  10-20 mg Q4HRS PRN Jeremy M Gonda, M.D.       • enalaprilat (VASOTEC) injection 1.25 mg  1.25 mg Q6HRS PRN Jeremy M Gonda, M.D.       • lidocaine (XYLOCAINE) 1 % injection 0.5 mL  0.5 mL Once PRN Lauren Carey M.D.       Last reviewed on 6/21/2021  1:52 PM by Ashly Fulton R.N.    No Known Allergies    Physical Exam  Body mass index is 34.32 kg/m². /92   Pulse 77   Temp 36 °C (96.8 °F) (Temporal)   Resp 17   Ht 1.778 m (5' 10\")  "  Wt 109 kg (239 lb 3.2 oz)   SpO2 98%    Vitals:    06/22/21 0400 06/22/21 0500 06/22/21 0600 06/22/21 0700   BP: 142/82 132/83 134/76 146/92   Pulse: 81 81 79 77   Resp: (!) 27 (!) 22 (!) 21 17   Temp: 36 °C (96.8 °F)      TempSrc: Temporal      SpO2: 95%  99% 98%   Weight:       Height:        Oxygen Therapy:  Pulse Oximetry: 98 %, O2 (LPM): 3, FiO2%: 40 %, O2 Delivery Device: Silicone Nasal Cannula    General: no acute distress, obese, ill appearing  Neck: No JVD, no bruits  Lungs: CTAB, diminished at bases, normal effort. Rhonchi, clear with cough  Heart: RRR, normal S1 /S2 no murmur, no rub  Chest: reproducible pain with palpation  EXT: no lower extremity edema, 2+ radial pulses. 2+ pedal pulses.   Abdomen: soft, non tender, non distended  Neurological: No focal deficits, no facial asymmetry. Not opening R eye.  Normal speech  Psychiatric: Appropriate affect, alert and oriented x 3  Skin: Warm and dry extremities, no rashes    Labs (personally reviewed):     Lab Results   Component Value Date/Time    SODIUM 138 06/22/2021 03:30 AM    POTASSIUM 5.3 06/22/2021 03:30 AM    CHLORIDE 106 06/22/2021 03:30 AM    CO2 23 06/22/2021 03:30 AM    GLUCOSE 111 (H) 06/22/2021 03:30 AM    BUN 9 06/22/2021 03:30 AM    CREATININE 0.74 06/22/2021 03:30 AM     Lab Results   Component Value Date/Time    ALKPHOSPHAT 73 06/21/2021 10:15 PM    ASTSGOT 74 (H) 06/21/2021 10:15 PM    ALTSGPT 39 06/21/2021 10:15 PM    TBILIRUBIN 0.5 06/21/2021 10:15 PM      Lab Results   Component Value Date/Time    CHOLSTRLTOT 151 06/21/2021 08:15 AM    LDL 87 06/21/2021 08:15 AM    HDL 33 (A) 06/21/2021 08:15 AM    TRIGLYCERIDE 155 (H) 06/21/2021 08:15 AM     Results for MARY JANG (MRN [id number]) as of 6/22/2021 07:38   Ref. Range 6/21/2021 04:15 6/21/2021 08:15 6/21/2021 12:15 6/21/2021 22:15 6/22/2021 03:30   Troponin T Latest Ref Range: 6 - 19 ng/L  230 (H) 429 (H) 582 (H) 569 (H)   NT-proBNP Latest Ref Range: 0 - 125 pg/mL 188 (H) 419 (H)           Cardiac Imaging and Procedures Review:      EKG 6/21/21: My Personal interpretation reveals SR 69, no ST changes    Echo 6/21/2021 :  CONCLUSIONS  Moderately reduced left ventricular systolic function. Left ventricular   ejection fraction is visually estimated to be 35-40%. Hypokinesis of   the mid to apical inferior wall, mid to distal septum and apical wall   segments. Grade I diastolic dysfunction.  Normal right ventricular size and systolic function.  Normal pericardium without effusion.    [facility] (today with Dr. Moreira)    Assessment and Medical Decision Making:    Cardiac arrest:   -VT vs Vfib  -Heparin for ACS  -possible need for secondary prevention AICD  -C this afternoon    Elevated Trop  -slight down trend today     Acute coronary syndrome   -Asa 325mg today, can be changed to 81mg for tomorrow  -High intensity statin, lipitor 40mg  -BB metoprolol 12.5mg, will change to 25mg XL given EF of 35%   -EF 35% will add on losartan 25mg ('s)   -No spirinolactone for now with K+ of 5.3    Thank you for allowing me to participate in this patients care.  Please contact me with any questions or concerns.    JAMES Palacios.   [facility]  [phone]

## 2021-06-22 NOTE — CARE PLAN
The patient is Watcher - Medium risk of patient condition declining or worsening    Shift Goals  Clinical Goals: monitor for seizure activity, monitor BP - sbp < 160, dbp < 110    Progress made toward(s) clinical / shift goals: No seizures during shift. BP < 160 this shift. No PRN HTN medications given.    Patient is not progressing towards the following goals:Pt will undergo a cardiac cath today post-arrest. Pt on heparin gtt with supra therapeutic Anti-xa levels at this time.

## 2021-06-22 NOTE — THERAPY
PT cardiac rehab consult received. Per EMR, pt pending further cardiac work up and cath. Will defer PT cardiac rehab evaluation until medical work up is complete. Thanks    Etta Lunsford, PT, DPT [employer] [id number]

## 2021-06-22 NOTE — PROGRESS NOTES
Cath lab: 1545    1715: Back to ICU     1735: 3mLs off hemoband, 134/89, HR 86, no signs of bleeding     1750: no signs of bleeding, 3mls pulled off, VSS    1805: 3mLs off, no signs of bleeding, VSS    1820: 3 mls off, no signs of bleeding, VSS    1835: 3 mls off, no signs of bleeding, VSS    1850: 3 mls off, no signs of bleeding, VSS, TR band off - site clean, dry and dry pressure gauze applied.

## 2021-06-22 NOTE — PROGRESS NOTES
Critical Care Progress Note    Date of admission  6/21/2021    Chief Complaint  60 y.o. male who presented 6/21/2021 with an unknown past medical history [place] when he suffered a witnessed cardiac arrest.  He underwent bystander CPR and AED was placed which defibrillated the patient x1.  Upon arrival by EMS, he remained pulseless and they performed ACLS for an additional 2 rounds before regaining spontaneous circulation.  They transported patient to the emergency department in [place] with an oropharyngeal airway in place.  Upon arrival to the ED, patient removed his own OPA and was very agitated and confused attempting to get out of bed.  He was subsequently intubated for airway protection and to facilitate work-up.  He was started on a propofol, fentanyl, heparin drip and given 300 mg of rectal aspirin.  A CT head and C-spine was performed which were both unremarkable before being transferred to [facility] for higher level of care.  In the emergency department here, patient awakens and follows but remains agitated and was seen by cardiology with plans for echocardiography.  He will not undergo code chill given his neurologic recovery.  No additional history is able to be obtained at this time given patient's current clinical condition.  (Dr Gonda [facility])    Hospital Course  No notes on file    Interval Problem Update  Reviewed last 24 hour events:    Extubated after arrival to [facility] yesterday, LOC ok after cardiac arrest - 4 min  [place] transfer  A&O x 2  SR 70s  SBp 110-140s  UO good  ECHO 35-40%  Trop peak 582  Heparin drip 17  Xa levels 0.18  3 lpm NC 99%  CXR CM, edema  IS 1000  Tm 101.5  WBC 11, improved  B/c 9/0.74  CT head and spine neg  ASA/statin      C today  D/c LR post cath  Recheck lytes  Check R eye      Review of Systems  Review of Systems   Constitutional: Positive for malaise/fatigue (Tired from poor sleep).   HENT: Negative for sore throat.    Eyes: Negative.     Respiratory: Positive for shortness of breath (Improved). Negative for cough and sputum production.    Cardiovascular: Positive for chest pain (With breathing - chest wall) and leg swelling. Negative for palpitations.   Gastrointestinal: Negative for abdominal pain, blood in stool, diarrhea, nausea (Resolved) and vomiting.   Genitourinary: Negative for dysuria, flank pain and hematuria.   Neurological: Negative for focal weakness and headaches.   Psychiatric/Behavioral: The patient has insomnia. The patient is not nervous/anxious.         Vital Signs for last 24 hours   Temp:  [36 °C (96.8 °F)-38.1 °C (100.5 °F)] 36.3 °C (97.3 °F)  Pulse:  [69-94] 85  Resp:  [12-31] 17  BP: (118-170)/() 146/88  SpO2:  [92 %-99 %] 95 %    Hemodynamic parameters for last 24 hours       Respiratory Information for the last 24 hours       Physical Exam   Physical Exam  Vitals reviewed.   Constitutional:       Appearance: He is obese. He is ill-appearing. He is not toxic-appearing.   HENT:      Mouth/Throat:      Mouth: Mucous membranes are moist.   Eyes:      General: No scleral icterus.     Extraocular Movements: Extraocular movements intact.      Pupils: Pupils are equal, round, and reactive to light.   Cardiovascular:      Rate and Rhythm: Normal rate and regular rhythm.      Pulses: Normal pulses.      Heart sounds: No murmur heard.   No gallop.       Comments:   Chest:      Chest wall: Tenderness present.   Abdominal:      General: Abdomen is protuberant. Bowel sounds are normal. There is no distension.      Palpations: Abdomen is soft.      Tenderness: There is no abdominal tenderness. There is no right CVA tenderness, left CVA tenderness or guarding. Negative signs include Fernandez's sign.   Genitourinary:     Comments: Chano  Musculoskeletal:      Cervical back: Neck supple.   Lymphadenopathy:      Cervical: No cervical adenopathy.   Skin:     General: Skin is warm and dry.      Capillary Refill: Capillary refill takes 2  to 3 seconds.      Coloration: Skin is not cyanotic or mottled.      Nails: There is no clubbing.   Neurological:      Comments: Awake and alert, follows, oriented x2 but close with his responses otherwise, short-term memory loss but not long-term seems okay, brainstem reflexes intact, moves all 4         Medications  Current Facility-Administered Medications   Medication Dose Route Frequency Provider Last Rate Last Admin   • [START ON 6/23/2021] aspirin EC (ECOTRIN) tablet 81 mg  81 mg Oral DAILY Amirah Corona, A.P.R.N.       • losartan (COZAAR) tablet 25 mg  25 mg Oral Q DAY Amirah Corona, A.P.R.N.   25 mg at 06/22/21 1346   • metoprolol SR (TOPROL XL) tablet 25 mg  25 mg Oral Q DAY Amirah Corona, A.P.R.N.   25 mg at 06/22/21 1346   • Respiratory Therapy Consult   Nebulization Continuous RT Jeremy M Gonda, M.D.       • senna-docusate (PERICOLACE or SENOKOT S) 8.6-50 MG per tablet 2 tablet  2 tablet Enteral Tube BID Jeremy M Gonda, M.D.   2 tablet at 06/21/21 1722    And   • polyethylene glycol/lytes (MIRALAX) PACKET 1 Packet  1 Packet Enteral Tube QDAY PRN Jeremy M Gonda, M.D.        And   • magnesium hydroxide (MILK OF MAGNESIA) suspension 30 mL  30 mL Enteral Tube QDAY PRN Jeremy M Gonda, M.D.        And   • bisacodyl (DULCOLAX) suppository 10 mg  10 mg Rectal QDAY PRN Jeremy M Gonda, M.D.       • MD Alert...ICU Electrolyte Replacement per Pharmacy   Other PHARMACY TO DOSE Jeremy M Gonda, M.D.       • lactated ringers infusion   Intravenous Continuous LIZETTE Alamo.O. 83 mL/hr at 06/21/21 1233 New Bag at 06/21/21 1233   • acetaminophen (Tylenol) tablet 650 mg  650 mg Oral Q6HRS PRN LIZETTE Alamo.O.   650 mg at 06/21/21 2043   • heparin infusion 25,000 units in 500 mL 0.45% NACL  0-30 Units/kg/hr (Adjusted) Intravenous Continuous Adam Chambers D.O. 30.7 mL/hr at 06/22/21 1205 17 Units/kg/hr at 06/22/21 1205   • heparin injection 2,000 Units  2,000 Units Intravenous PRN Adam  Trish D.O.   2,000 Units at 06/22/21 0426   • atorvastatin (LIPITOR) tablet 40 mg  40 mg Oral Q EVENING Adam Chambers D.O.   40 mg at 06/21/21 1722   • ondansetron (ZOFRAN) syringe/vial injection 4 mg  4 mg Intravenous Q4HRS PRN Adam Chambers D.O.       • ondansetron (ZOFRAN ODT) dispertab 4 mg  4 mg Oral Q4HRS PRN Adam Chambers D.O.       • promethazine (PHENERGAN) tablet 12.5-25 mg  12.5-25 mg Oral Q4HRS PRN Adam Chambers D.O.       • promethazine (PHENERGAN) suppository 12.5-25 mg  12.5-25 mg Rectal Q4HRS PRN Adam Chambers D.O.       • prochlorperazine (COMPAZINE) injection 5-10 mg  5-10 mg Intravenous Q4HRS PRN Adam Chambers D.O.       • insulin regular (HumuLIN R,NovoLIN R) injection  3-18 Units Subcutaneous 4X/DAY ACHLIZETTE Marcos.O.   3 Units at 06/21/21 1311   • thiamine (Vitamin B-1) tablet 100 mg  100 mg Oral DAILY Jeremy M Gonda, M.D.   100 mg at 06/22/21 0749    And   • folic acid (FOLVITE) tablet 1 mg  1 mg Oral DAILY Jeremy M Gonda, M.D.   1 mg at 06/22/21 0749    And   • multivitamin (THERAGRAN) tablet 1 tablet  1 tablet Oral DAILY Jeremy M Gonda, M.D.   1 tablet at 06/22/21 0749   • naloxone (NARCAN) injection 0.1 mg  0.1 mg Intravenous Once Jeremy M Gonda, M.D.       • lidocaine (LIDODERM) 5 % 1 Patch  1 Patch Transdermal Q24HR Jeremy M Gonda, M.D.   1 Patch at 06/21/21 1647   • labetalol (NORMODYNE/TRANDATE) injection 10-20 mg  10-20 mg Intravenous Q4HRS PRN Jeremy M Gonda, M.D.       • enalaprilat (VASOTEC) injection 1.25 mg  1.25 mg Intravenous Q6HRS PRN Jeremy M Gonda, M.D.       • lidocaine (XYLOCAINE) 1 % injection 0.5 mL  0.5 mL Intradermal Once PRN Lauren Carey M.D.           Fluids    Intake/Output Summary (Last 24 hours) at 6/22/2021 1358  Last data filed at 6/22/2021 0800  Gross per 24 hour   Intake 1960.34 ml   Output 1225 ml   Net 735.34 ml       Laboratory  Recent Labs     06/21/21  1046   ISTATAPH  7.456   ISTATAPCO2 29.7   ISTATAPO2 101*   ISTATATCO2 22   [id number] 98   ISTATARTHCO3 20.9   ISTATARTBE -2   ISTATTEMP 97.0 F   ISTATFIO2 55   ISTATSPEC Arterial   ISTATAPHTC 7.469   [id number] 96*     Recent Labs     06/21/21 0415   TROPONINI 0.49*     Recent Labs     06/21/21 0815 06/21/21 1215 06/21/21 2215 06/22/21  0330   SODIUM 142  --  141 138   POTASSIUM 4.5  --  4.0 5.3   CHLORIDE 112  --  107 106   CO2 20  --  23 23   BUN 12  --  9 9   CREATININE 0.83  --  0.76 0.74   MAGNESIUM 1.8 2.2  --  2.1   PHOSPHORUS 2.9  --   --  2.5   CALCIUM 8.2*  --  7.9* 7.8*     Recent Labs     06/21/21 0415 06/21/21 0415 06/21/21 0815 06/21/21 2215 06/22/21  0330   ALTSGPT 48  --  36 39  --    ASTSGOT 52*  --  54* 74*  --    ALKPHOSPHAT 81  --  63 73  --    TBILIRUBIN 0.2  --  0.3 0.5  --    GLUCOSE 181*   < > 104* 108* 111*    < > = values in this interval not displayed.     Recent Labs     06/21/21 0415 06/21/21  0626 06/21/21 0815 06/21/21 2215 06/22/21  0330   WBC 14.7* 15.4*  --   --  11.0*   NEUTSPOLYS  --  85.90*  --   --  82.00*   LYMPHOCYTES  --  6.90*  --   --  9.40*   MONOCYTES  --  5.80  --   --  7.30   EOSINOPHILS  --  0.10  --   --  0.60   BASOPHILS  --  0.30  --   --  0.10   ASTSGOT 52*  --  54* 74*  --    ALTSGPT 48  --  36 39  --    ALKPHOSPHAT 81  --  63 73  --    TBILIRUBIN 0.2  --  0.3 0.5  --      Recent Labs     06/21/21 0415 06/21/21  0626 06/21/21  0815 06/21/21  1942 06/22/21  0330   RBC 4.60* 4.21*  --   --  4.11*   HEMOGLOBIN 14.9 13.7*  --   --  13.1*   HEMATOCRIT 45.8 41.1*  --   --  40.1*   PLATELETCT 326 240  --   --  197   PROTHROMBTM 10.1  --  13.8 13.9  --    APTT 21.0*  --  25.2 38.2*  --    INR 0.95  --  1.09 1.10  --        Imaging  X-Ray:  I have personally reviewed the images and compared with prior images.  EKG:  I have personally reviewed the images and compared with prior images.  CT:    Reviewed  Echo:   Reviewed    Assessment/Plan  * Cardiac arrest (HCC)  Assessment  & Plan  Out of hospital arrest with unknown rhythm although likely V. fib/V. tach given AED defibrillation  Cardiology consulted, defer antiarrhythmics to cardiology  Optimize electrolytes  Antiplatelet, continue heparin drip  Stat echocardiography  Left heart catheterization planned  Continuous telemetry monitoring without significant ectopy  Supportive care, not a TTM candidate given intact neurologic status  Hemodynamically, neurologically after respiratory standpoint did well, extubated afternoon day of admission/arrest    Acute respiratory failure with hypoxia (HCC)  Assessment & Plan  Intubated at outside hospital 6/21  Extubated afternoon 6/21  RT/O2 protocol  Limit sedatives  Incentive spirometry, mobilize when okay with cardiology likely post heart catheterization  Forced diuresis when able given signs of developing pulmonary edema  Chest x-ray as needed    Acute encephalopathy  Assessment & Plan  Secondary to arrest versus alcohol intoxication?  Likely the former, monitoring for withdrawal secondary to the latter  Limit sedatives with close neurologic monitoring  Seizure and aspiration precautions  Suspect mild anoxic injury from arrest, hopefully will improve with time, monitoring  Brother thought his short-term memory was off during conversation with patient and him 6/22      Alcohol intoxication ([facility])  Assessment & Plan  Unknown severity of alcohol use  Rally bag/vitamin supplementation  Monitor for alcohol withdrawal syndrome, no so far  Optimize electrolytes    WEN (acute kidney injury) (HCC)  Assessment & Plan  Improved  Likely component of ATN in the setting of cardiac arrest  Avoid nephrotoxins renally dose medications  Monitor creatinine, urine output, electrolytes closely    Leukemoid reaction  Assessment & Plan  Likely reactive, doubt infection  White count coming down  Continue to monitor off antibiotics for now    Hypomagnesemia  Assessment & Plan  Replete, ERP, goal greater than  2.0    Hyperglycemia  Assessment & Plan  Insulin sliding scale as needed  hemoglobin A1c 5.0  Hypoglycemia protocols    Hypernatremia  Assessment & Plan  Improved/normalized  Euvolemic or volume up clinically  Continue serial BMP       VTE:  Heparin  Ulcer: H2 Antagonist  Lines: None    I have performed a physical exam and reviewed and updated ROS and Plan today (6/22/2021). In review of yesterday's note (6/21/2021), there are no changes except as documented above.     Discussed patient condition and risk of morbidity and/or mortality with RN, RT, Pharmacy, Charge nurse / hot rounds, Patient and [facility]

## 2021-06-22 NOTE — DISCHARGE PLANNING
Care Transition Team Discharge Planning    Anticipated Discharge Disposition: TBD    Action: Lsw received return call from pt's friend, Donovan Polanco. He is out hiking and does not always have internet reception. He would like to discuss pt, and is worried about him being in hospital.    Barriers to Discharge: TBD     Plan: Lsw will continue to follow, and assist w/ d/c planning.

## 2021-06-23 ENCOUNTER — APPOINTMENT (OUTPATIENT)
Dept: RADIOLOGY | Facility: MEDICAL CENTER | Age: 61
DRG: 224 | End: 2021-06-23
Attending: INTERNAL MEDICINE
Payer: MEDICAID

## 2021-06-23 PROBLEM — I63.9 CVA (CEREBRAL VASCULAR ACCIDENT) (HCC): Status: ACTIVE | Noted: 2021-06-23

## 2021-06-23 PROBLEM — I25.10 CORONARY ARTERY DISEASE INVOLVING NATIVE CORONARY ARTERY OF NATIVE HEART: Status: ACTIVE | Noted: 2021-06-23

## 2021-06-23 PROBLEM — E83.39 HYPOPHOSPHATEMIA: Status: ACTIVE | Noted: 2021-06-23

## 2021-06-23 LAB
ANION GAP SERPL CALC-SCNC: 6 MMOL/L (ref 7–16)
BASOPHILS # BLD AUTO: 0.2 % (ref 0–1.8)
BASOPHILS # BLD: 0.02 K/UL (ref 0–0.12)
BUN SERPL-MCNC: 8 MG/DL (ref 8–22)
CALCIUM SERPL-MCNC: 8.3 MG/DL (ref 8.5–10.5)
CHLORIDE SERPL-SCNC: 105 MMOL/L (ref 96–112)
CO2 SERPL-SCNC: 26 MMOL/L (ref 20–33)
CREAT SERPL-MCNC: 0.63 MG/DL (ref 0.5–1.4)
EOSINOPHIL # BLD AUTO: 0.13 K/UL (ref 0–0.51)
EOSINOPHIL NFR BLD: 1.6 % (ref 0–6.9)
ERYTHROCYTE [DISTWIDTH] IN BLOOD BY AUTOMATED COUNT: 51.8 FL (ref 35.9–50)
GLUCOSE BLD-MCNC: 107 MG/DL (ref 65–99)
GLUCOSE BLD-MCNC: 111 MG/DL (ref 65–99)
GLUCOSE BLD-MCNC: 113 MG/DL (ref 65–99)
GLUCOSE BLD-MCNC: 113 MG/DL (ref 65–99)
GLUCOSE SERPL-MCNC: 125 MG/DL (ref 65–99)
HCT VFR BLD AUTO: 36 % (ref 42–52)
HGB BLD-MCNC: 11.6 G/DL (ref 14–18)
IMM GRANULOCYTES # BLD AUTO: 0.05 K/UL (ref 0–0.11)
IMM GRANULOCYTES NFR BLD AUTO: 0.6 % (ref 0–0.9)
LYMPHOCYTES # BLD AUTO: 0.81 K/UL (ref 1–4.8)
LYMPHOCYTES NFR BLD: 9.8 % (ref 22–41)
MAGNESIUM SERPL-MCNC: 1.8 MG/DL (ref 1.5–2.5)
MCH RBC QN AUTO: 31.6 PG (ref 27–33)
MCHC RBC AUTO-ENTMCNC: 32.2 G/DL (ref 33.7–35.3)
MCV RBC AUTO: 98.1 FL (ref 81.4–97.8)
MONOCYTES # BLD AUTO: 0.75 K/UL (ref 0–0.85)
MONOCYTES NFR BLD AUTO: 9 % (ref 0–13.4)
NEUTROPHILS # BLD AUTO: 6.54 K/UL (ref 1.82–7.42)
NEUTROPHILS NFR BLD: 78.8 % (ref 44–72)
NRBC # BLD AUTO: 0 K/UL
NRBC BLD-RTO: 0 /100 WBC
PHOSPHATE SERPL-MCNC: 1.4 MG/DL (ref 2.5–4.5)
PLATELET # BLD AUTO: 181 K/UL (ref 164–446)
PMV BLD AUTO: 10.4 FL (ref 9–12.9)
POTASSIUM SERPL-SCNC: 3.7 MMOL/L (ref 3.6–5.5)
RBC # BLD AUTO: 3.67 M/UL (ref 4.7–6.1)
SODIUM SERPL-SCNC: 137 MMOL/L (ref 135–145)
UFH PPP CHRO-ACNC: 0.18 IU/ML
WBC # BLD AUTO: 8.3 K/UL (ref 4.8–10.8)

## 2021-06-23 PROCEDURE — 770020 HCHG ROOM/CARE - TELE (206)

## 2021-06-23 PROCEDURE — 700102 HCHG RX REV CODE 250 W/ 637 OVERRIDE(OP): Performed by: INTERNAL MEDICINE

## 2021-06-23 PROCEDURE — 83735 ASSAY OF MAGNESIUM: CPT

## 2021-06-23 PROCEDURE — 82962 GLUCOSE BLOOD TEST: CPT

## 2021-06-23 PROCEDURE — 85025 COMPLETE CBC W/AUTO DIFF WBC: CPT

## 2021-06-23 PROCEDURE — A9270 NON-COVERED ITEM OR SERVICE: HCPCS | Performed by: HOSPITALIST

## 2021-06-23 PROCEDURE — 700111 HCHG RX REV CODE 636 W/ 250 OVERRIDE (IP): Performed by: INTERNAL MEDICINE

## 2021-06-23 PROCEDURE — 700101 HCHG RX REV CODE 250: Performed by: INTERNAL MEDICINE

## 2021-06-23 PROCEDURE — 84100 ASSAY OF PHOSPHORUS: CPT

## 2021-06-23 PROCEDURE — A9270 NON-COVERED ITEM OR SERVICE: HCPCS | Performed by: NURSE PRACTITIONER

## 2021-06-23 PROCEDURE — 80048 BASIC METABOLIC PNL TOTAL CA: CPT

## 2021-06-23 PROCEDURE — 85520 HEPARIN ASSAY: CPT

## 2021-06-23 PROCEDURE — 700102 HCHG RX REV CODE 250 W/ 637 OVERRIDE(OP): Performed by: HOSPITALIST

## 2021-06-23 PROCEDURE — 700102 HCHG RX REV CODE 250 W/ 637 OVERRIDE(OP): Performed by: NURSE PRACTITIONER

## 2021-06-23 PROCEDURE — 700105 HCHG RX REV CODE 258: Performed by: INTERNAL MEDICINE

## 2021-06-23 PROCEDURE — 99233 SBSQ HOSP IP/OBS HIGH 50: CPT | Performed by: INTERNAL MEDICINE

## 2021-06-23 PROCEDURE — 99233 SBSQ HOSP IP/OBS HIGH 50: CPT | Performed by: HOSPITALIST

## 2021-06-23 PROCEDURE — 700111 HCHG RX REV CODE 636 W/ 250 OVERRIDE (IP): Performed by: HOSPITALIST

## 2021-06-23 PROCEDURE — 99232 SBSQ HOSP IP/OBS MODERATE 35: CPT | Performed by: STUDENT IN AN ORGANIZED HEALTH CARE EDUCATION/TRAINING PROGRAM

## 2021-06-23 PROCEDURE — A9270 NON-COVERED ITEM OR SERVICE: HCPCS | Performed by: INTERNAL MEDICINE

## 2021-06-23 RX ORDER — SPIRONOLACTONE 25 MG/1
12.5 TABLET ORAL
Status: DISCONTINUED | OUTPATIENT
Start: 2021-06-23 | End: 2021-06-25

## 2021-06-23 RX ORDER — CLOPIDOGREL BISULFATE 75 MG/1
75 TABLET ORAL DAILY
Status: DISCONTINUED | OUTPATIENT
Start: 2021-06-23 | End: 2021-07-01 | Stop reason: HOSPADM

## 2021-06-23 RX ORDER — MAGNESIUM SULFATE HEPTAHYDRATE 40 MG/ML
2 INJECTION, SOLUTION INTRAVENOUS ONCE
Status: COMPLETED | OUTPATIENT
Start: 2021-06-23 | End: 2021-06-23

## 2021-06-23 RX ORDER — GUAIFENESIN 600 MG/1
600 TABLET, EXTENDED RELEASE ORAL EVERY 12 HOURS
Status: DISCONTINUED | OUTPATIENT
Start: 2021-06-23 | End: 2021-07-01 | Stop reason: HOSPADM

## 2021-06-23 RX ADMIN — METOPROLOL SUCCINATE 25 MG: 25 TABLET, EXTENDED RELEASE ORAL at 05:39

## 2021-06-23 RX ADMIN — GUAIFENESIN 600 MG: 600 TABLET, EXTENDED RELEASE ORAL at 17:31

## 2021-06-23 RX ADMIN — Medication 100 MG: at 05:38

## 2021-06-23 RX ADMIN — THERA TABS 1 TABLET: TAB at 05:38

## 2021-06-23 RX ADMIN — HEPARIN SODIUM 17 UNITS/KG/HR: 5000 INJECTION, SOLUTION INTRAVENOUS at 00:15

## 2021-06-23 RX ADMIN — ASPIRIN 81 MG: 81 TABLET, COATED ORAL at 05:38

## 2021-06-23 RX ADMIN — POTASSIUM PHOSPHATE, MONOBASIC AND POTASSIUM PHOSPHATE, DIBASIC 15 MMOL: 224; 236 INJECTION, SOLUTION, CONCENTRATE INTRAVENOUS at 21:21

## 2021-06-23 RX ADMIN — LIDOCAINE 1 PATCH: 50 PATCH TOPICAL at 17:31

## 2021-06-23 RX ADMIN — FOLIC ACID 1 MG: 1 TABLET ORAL at 05:39

## 2021-06-23 RX ADMIN — MAGNESIUM SULFATE 2 G: 2 INJECTION INTRAVENOUS at 12:40

## 2021-06-23 RX ADMIN — HEPARIN SODIUM 2000 UNITS: 1000 INJECTION, SOLUTION INTRAVENOUS; SUBCUTANEOUS at 11:07

## 2021-06-23 RX ADMIN — ATORVASTATIN CALCIUM 40 MG: 40 TABLET, FILM COATED ORAL at 17:31

## 2021-06-23 RX ADMIN — CLOPIDOGREL BISULFATE 75 MG: 75 TABLET ORAL at 14:35

## 2021-06-23 RX ADMIN — DOCUSATE SODIUM 50 MG AND SENNOSIDES 8.6 MG 2 TABLET: 8.6; 5 TABLET, FILM COATED ORAL at 05:39

## 2021-06-23 RX ADMIN — POTASSIUM PHOSPHATE, MONOBASIC AND POTASSIUM PHOSPHATE, DIBASIC 30 MMOL: 224; 236 INJECTION, SOLUTION, CONCENTRATE INTRAVENOUS at 14:35

## 2021-06-23 RX ADMIN — LOSARTAN POTASSIUM 25 MG: 25 TABLET, FILM COATED ORAL at 05:38

## 2021-06-23 RX ADMIN — SPIRONOLACTONE 12.5 MG: 25 TABLET ORAL at 17:31

## 2021-06-23 ASSESSMENT — ENCOUNTER SYMPTOMS
NERVOUS/ANXIOUS: 0
BLOOD IN STOOL: 0
COUGH: 0
NAUSEA: 0
SHORTNESS OF BREATH: 0
HEADACHES: 0
INSOMNIA: 0
FLANK PAIN: 0
PALPITATIONS: 0
ABDOMINAL PAIN: 0
SORE THROAT: 0
VOMITING: 0
EYES NEGATIVE: 1
SPUTUM PRODUCTION: 0
DIARRHEA: 0
FOCAL WEAKNESS: 0

## 2021-06-23 ASSESSMENT — COGNITIVE AND FUNCTIONAL STATUS - GENERAL
HELP NEEDED FOR BATHING: A LOT
MOVING FROM LYING ON BACK TO SITTING ON SIDE OF FLAT BED: A LOT
EATING MEALS: A LITTLE
CLIMB 3 TO 5 STEPS WITH RAILING: TOTAL
MOVING TO AND FROM BED TO CHAIR: A LOT
DRESSING REGULAR LOWER BODY CLOTHING: A LOT
TOILETING: A LOT
PERSONAL GROOMING: A LITTLE
MOBILITY SCORE: 12
SUGGESTED CMS G CODE MODIFIER MOBILITY: CL
SUGGESTED CMS G CODE MODIFIER DAILY ACTIVITY: CK
WALKING IN HOSPITAL ROOM: A LOT
DRESSING REGULAR UPPER BODY CLOTHING: A LOT
DAILY ACTIVITIY SCORE: 14
TURNING FROM BACK TO SIDE WHILE IN FLAT BAD: A LITTLE
STANDING UP FROM CHAIR USING ARMS: A LOT

## 2021-06-23 ASSESSMENT — LIFESTYLE VARIABLES
HAVE YOU EVER FELT YOU SHOULD CUT DOWN ON YOUR DRINKING: NO
HOW MANY TIMES IN THE PAST YEAR HAVE YOU HAD 5 OR MORE DRINKS IN A DAY: 1
TOTAL SCORE: 0
ON A TYPICAL DAY WHEN YOU DRINK ALCOHOL HOW MANY DRINKS DO YOU HAVE: 2
AVERAGE NUMBER OF DAYS PER WEEK YOU HAVE A DRINK CONTAINING ALCOHOL: 3
ALCOHOL_USE: YES
DOES PATIENT WANT TO STOP DRINKING: NO
HAVE PEOPLE ANNOYED YOU BY CRITICIZING YOUR DRINKING: NO
EVER FELT BAD OR GUILTY ABOUT YOUR DRINKING: NO
TOTAL SCORE: 0
TOTAL SCORE: 0
CONSUMPTION TOTAL: POSITIVE
EVER HAD A DRINK FIRST THING IN THE MORNING TO STEADY YOUR NERVES TO GET RID OF A HANGOVER: NO

## 2021-06-23 ASSESSMENT — PAIN DESCRIPTION - PAIN TYPE
TYPE: ACUTE PAIN

## 2021-06-23 NOTE — PROGRESS NOTES
Hospital Medicine Daily Progress Note    Date of Service  6/23/2021    Chief Complaint  60 y.o. male admitted 6/21/2021 with cardiac arrest and a local casino in the [place] area.  The patient was shocked by AED x1 and transported to a local emergency room with pulses present at the time.    Hospital Course  This is a 60-year-old male that was brought to an outlying facility after out of hospital arrest and after initial stabilization transferred to [facility] for further definitive intervention.  Reportedly the patient was found down with an unknown amount of downtime, there was bystander CPR and AED was attached with discharge x1.  EMS arrived and placed a airway and started transport.  The patient had additional several rounds of CPR in transit and then had return of neurologic function.  The patient was combative but was able to tell the paramedics his name.  He then was intubated in the emergency room at the [facility] and brought to [facility].  The patient was given heparin and aspirin initially.  The patient reportedly had a history of stroke with left-sided deficits, after initial presentation he improved but remains still with memory deficits.  The patient went for left heart catheterization that was found to have multivessel significant coronary disease.  Cardiothoracic surgery recommends to not proceed with open heart surgery but alternative treatment.  High risk PCI recommended.    Interval Problem Update  Patient seen and examined today.    Patient tolerating treatment and therapies.  All Data, Medication data reviewed.  Case discussed with nursing as available.  Plan of Care reviewed with patient and notified of changes.  6/23 the patient feels tired and is sore in his chest, he still has memory deficits, he has a hard time recalling normal orientation questions.  Alert and oriented x2-3, sinus rhythm in the 70s, blood pressure 110s over 120s, good urine output, 2  L per nasal cannula oxygen, low-grade temperature, 99.1, remains on heparin drip, aspirin, statin.  Per cardiology the patient is okay to go to telemetry.  Electrolytes being replaced,  Consultants/Specialty  Critical care  Cardiology  Code Status  Full Code    Disposition  Telemetry    Review of Systems  Review of Systems   Unable to perform ROS: Mental status change        Physical Exam  Temp:  [36.2 °C (97.1 °F)-37.3 °C (99.1 °F)] 36.4 °C (97.6 °F)  Pulse:  [68-99] 78  Resp:  [12-27] 21  BP: (101-152)/(56-90) 126/75  SpO2:  [92 %-98 %] 92 %    Physical Exam  Vitals and nursing note reviewed.   Constitutional:       Appearance: He is well-developed. He is obese.      Interventions: Nasal cannula in place.      Comments: Pt seen and examined.   HENT:      Head: Normocephalic and atraumatic.   Eyes:      Pupils: Pupils are equal, round, and reactive to light.   Cardiovascular:      Rate and Rhythm: Normal rate.      Heart sounds: Normal heart sounds.   Pulmonary:      Effort: Pulmonary effort is normal.      Breath sounds: Rhonchi and rales present.   Abdominal:      General: Bowel sounds are normal.      Palpations: Abdomen is soft.   Genitourinary:     Penis: Normal.    Musculoskeletal:         General: Normal range of motion.      Cervical back: Normal range of motion and neck supple.   Skin:     General: Skin is warm and dry.   Neurological:      Mental Status: He is alert. He is disoriented.      Motor: Weakness present.   Psychiatric:         Behavior: Behavior normal.         Fluids    Intake/Output Summary (Last 24 hours) at 6/23/2021 1424  Last data filed at 6/23/2021 1200  Gross per 24 hour   Intake 4500.14 ml   Output 2100 ml   Net 2400.14 ml       Laboratory  Recent Labs     06/21/21  0626 06/22/21  0330 06/23/21  0550   WBC 15.4* 11.0* 8.3   RBC 4.21* 4.11* 3.67*   HEMOGLOBIN 13.7* 13.1* 11.6*   HEMATOCRIT 41.1* 40.1* 36.0*   MCV 97.6 97.6 98.1*   MCH 32.5 31.9 31.6   MCHC 33.3* 32.7* 32.2*   RDW 50.5*  49.9 51.8*   PLATELETCT 240 197 181   MPV 9.8 10.1 10.4     Recent Labs     06/21/21  2215 06/22/21  0330 06/23/21  0945   SODIUM 141 138 137   POTASSIUM 4.0 5.3 3.7   CHLORIDE 107 106 105   CO2 23 23 26   GLUCOSE 108* 111* 125*   BUN 9 9 8   CREATININE 0.76 0.74 0.63   CALCIUM 7.9* 7.8* 8.3*     Recent Labs     06/21/21  0415 06/21/21  0815 06/21/21  1942   APTT 21.0* 25.2 38.2*   INR 0.95 1.09 1.10         Recent Labs     06/21/21  0815   TRIGLYCERIDE 155*   HDL 33*   LDL 87       Imaging  DX-CHEST-PORTABLE (1 VIEW)   Final Result      Interval extubation with similar moderate diffuse patchy opacity worrisome for atypical infection or aspiration      EC-ECHOCARDIOGRAM COMPLETE W/O CONT   Final Result      OUTSIDE IMAGES-CT CERVICAL SPINE   Final Result      OUTSIDE IMAGES-CT HEAD   Final Result      OUTSIDE IMAGES-DX CHEST   Final Result      DX-CHEST-PORTABLE (1 VIEW)   Final Result         Intubated.      Patchy bilateral lower lung opacities, atelectasis or infection.      Mildly prominent cardiopericardial silhouette.      CL-LEFT HEART CATHETERIZATION WITH POSSIBLE INTERVENTION    (Results Pending)        Assessment/Plan  * Cardiac arrest (HCC)  Assessment & Plan  Patient found with multivessel coronary disease  Continue telemonitoring  Cardiology consulting  Optimization of vascular compromise underway        Coronary artery disease involving native coronary artery of native heart  Assessment & Plan  Multivessel,  Cardiothoracic surgery feels the patient is too high risk for surgical intervention  Cardiology following for decision making, possible high risk PCI    CVA (cerebral vascular accident) (HCC)  Assessment & Plan  With left-sided deficits, details unknown    Hypophosphatemia  Assessment & Plan  Replace and recheck    Leukemoid reaction  Assessment & Plan  Resolved, follow    Acute encephalopathy  Assessment & Plan  Possibly secondary to prolonged downtime, hypoperfusion  The patient is slowly  improving, monitor    Hypomagnesemia  Assessment & Plan  Replace electrolytes and recheck    Alcohol intoxication (HCC)  Assessment & Plan  Patient presents with an elevated BAL unknown if he is a chronic drinker.    Metabolic acidosis  Assessment & Plan  Resolved, secondary to acute event, hypoperfusion    Hyperglycemia  Assessment & Plan  Low hemoglobin A1c, monitor, insulin per sliding scale    Hypernatremia  Assessment & Plan  Monitor    WEN (acute kidney injury) (HCC)  Assessment & Plan  Improved, follow, avoid further injury    Acute respiratory failure with hypoxia (HCC)  Assessment & Plan  Patient intubated for airway protection following out-of-hospital arrest  Extubated, tolerating,     Plan  Continue with GDMT  Primarily medical therapy for the time being,  Cardiology managing in terms of decision-making of revascularization  Optimize electrolytes  Continue telemonitoring  Medically complex high risk patient  VTE prophylaxis: Lovenox    I have performed a physical exam and reviewed and updated ROS and Plan today . In review of yesterday's note , there are no changes except as documented above.        Please note that this dictation was created using voice recognition software. I have made every reasonable attempt to correct obvious errors, but I expect that there are errors of grammar and possibly context that I did not discover before finalizing the note.

## 2021-06-23 NOTE — CONSULTS
REFERRING PHYSICIAN: Keith Moreira MD.     CONSULTING PHYSICIAN: Tez Almanzar MD, FACS.    CHIEF COMPLAINT: Chest pain s/p out of hospital arrest.    HISTORY OF PRESENT ILLNESS: The patient is a 60 y.o. male who was at a casino in [place] the morning of 6/21/2021 when he had a witnessed cardiac arrest.  He received bystander CPR and law enforcement placed AED which defibrillated the patient x 1.  EMS arrived and did two rounds of ACLS before gaining ROSC.  He was transported to [facility] where he was very agitated/combative.  He was subsequently sedated and intubated for airway protection and to facilitate further work up.  He [place] [facility] [place].  He is now extubated and underwent cardiac catheterization this afternoon.  He is slow to answer questions and agitated, but able to move extremities purposefully at his baseline.      Social workers have contacted his brother and sister in law for more detailed history.  Sharon, sister in law, states that patient has a long hx of poor medical management.  Reports a stroke 7 years ago that was untreated.  He uses a cane but drags his affected leg.  She recently purchased an electric scooter for him to help him get outside.  Her and her  live 3 hours away and report patient lives alone.      PAST MEDICAL HISTORY:   CVA 7 years prior.  Poor historian.    PAST SURGICAL HISTORY:   Currently unknown.     FAMILY HISTORY:   History reviewed.  No pertinent cardiac history reported.    SOCIAL HISTORY:   Social History     Socioeconomic History   • Marital status: Single     Spouse name: Not on file   • Number of children: Not on file   • Years of education: Not on file   • Highest education level: Not on file   Occupational History   • Not on file   Tobacco Use   • Smoking status: Never Smoker   • Smokeless tobacco: Never Used   Vaping Use   • Vaping Use: Never used   Substance and Sexual Activity   • Alcohol use: Yes   • Drug use: Never   •  Sexual activity: Not on file   Other Topics Concern   • Not on file   Social History Narrative   • Not on file     Social Determinants of Health     Financial Resource Strain:    • Difficulty of Paying Living Expenses:    Food Insecurity:    • Worried About Running Out of Food in the Last Year:    • Ran Out of Food in the Last Year:    Transportation Needs:    • Lack of Transportation (Medical):    • Lack of Transportation (Non-Medical):    Physical Activity:    • Days of Exercise per Week:    • Minutes of Exercise per Session:    Stress:    • Feeling of Stress :    Social Connections:    • Frequency of Communication with Friends and Family:    • Frequency of Social Gatherings with Friends and Family:    • Attends Buddhist Services:    • Active Member of Clubs or Organizations:    • Attends Club or Organization Meetings:    • Marital Status:    Intimate Partner Violence:    • Fear of Current or Ex-Partner:    • Emotionally Abused:    • Physically Abused:    • Sexually Abused:        ALLERGIES:   No Known Allergies     CURRENT MEDICATIONS:     Current Facility-Administered Medications:   •  [START ON 6/23/2021] aspirin EC (ECOTRIN) tablet 81 mg, 81 mg, Oral, DAILY, Amirah Corona, A.P.R.N.  •  losartan (COZAAR) tablet 25 mg, 25 mg, Oral, Q DAY, Amirah WATTERS. Corona, A.P.R.N., 25 mg at 06/22/21 1346  •  metoprolol SR (TOPROL XL) tablet 25 mg, 25 mg, Oral, Q DAY, Amirah WATTERS. Corona, A.P.R.N., 25 mg at 06/22/21 1346  •  HEPARIN (PORCINE) IN NACL 2000-0.9 UNIT/L-% IV SOLN, , , ,   •  FENTANYL CITRATE (PF) 0.05 MG/ML INJ SOLN (WRAPPED), , , ,   •  MIDAZOLAM HCL 2 MG/2ML INJ SOLN (WRAPPED), , , ,   •  FENTANYL CITRATE (PF) 0.05 MG/ML INJ SOLN (WRAPPED), , , ,   •  MIDAZOLAM HCL 2 MG/2ML INJ SOLN (WRAPPED), , , ,   •  Respiratory Therapy Consult, , Nebulization, Continuous RT, Jeremy M Gonda, M.D.  •  senna-docusate (PERICOLACE or SENOKOT S) 8.6-50 MG per tablet 2 tablet, 2 tablet, Enteral Tube, BID, 2 tablet at 06/21/21 1722  **AND** polyethylene glycol/lytes (MIRALAX) PACKET 1 Packet, 1 Packet, Enteral Tube, QDAY PRN **AND** magnesium hydroxide (MILK OF MAGNESIA) suspension 30 mL, 30 mL, Enteral Tube, QDAY PRN **AND** bisacodyl (DULCOLAX) suppository 10 mg, 10 mg, Rectal, QDAY PRN, Jeremy M Gonda, M.D.  •  MD Alert...ICU Electrolyte Replacement per Pharmacy, , Other, PHARMACY TO DOSE, Jeremy M Gonda, M.D.  •  lactated ringers infusion, , Intravenous, Continuous, Adam Chambers, D.O., Last Rate: 83 mL/hr at 06/21/21 1233, New Bag at 06/21/21 1233  •  acetaminophen (Tylenol) tablet 650 mg, 650 mg, Oral, Q6HRS PRN, Adam Chambers, D.O., 650 mg at 06/21/21 2043  •  heparin infusion 25,000 units in 500 mL 0.45% NACL, 0-30 Units/kg/hr (Adjusted), Intravenous, Continuous, Adam Chambers, D.O., Last Rate: 30.7 mL/hr at 06/22/21 1700, 17 Units/kg/hr at 06/22/21 1700  •  heparin injection 2,000 Units, 2,000 Units, Intravenous, PRN, Adam Chambers, D.O., 2,000 Units at 06/22/21 0426  •  atorvastatin (LIPITOR) tablet 40 mg, 40 mg, Oral, Q EVENING, Adam Chambers D.O., 40 mg at 06/21/21 1722  •  ondansetron (ZOFRAN) syringe/vial injection 4 mg, 4 mg, Intravenous, Q4HRS PRN, Adam Chambers D.O.  •  ondansetron (ZOFRAN ODT) dispertab 4 mg, 4 mg, Oral, Q4HRS PRN, Adam Chambers D.O.  •  promethazine (PHENERGAN) tablet 12.5-25 mg, 12.5-25 mg, Oral, Q4HRS PRN, Adam Chambers D.O.  •  promethazine (PHENERGAN) suppository 12.5-25 mg, 12.5-25 mg, Rectal, Q4HRS PRN, Adam Chambers D.O.  •  prochlorperazine (COMPAZINE) injection 5-10 mg, 5-10 mg, Intravenous, Q4HRS PRN, Adam Chambers D.O.  •  insulin regular (HumuLIN R,NovoLIN R) injection, 3-18 Units, Subcutaneous, 4X/DAY ACHS, Adam Chambers D.O., 3 Units at 06/21/21 1311  •  [COMPLETED] detox IV 1000 mL (D5LR + magnesium 1 g + thiamine 100 mg + folic acid 1 mg) infusion, , Intravenous, Once, Last Rate: 500 mL/hr  at 06/21/21 1020, New Bag at 06/21/21 1020 **AND** thiamine (Vitamin B-1) tablet 100 mg, 100 mg, Oral, DAILY, 100 mg at 06/22/21 0749 **AND** folic acid (FOLVITE) tablet 1 mg, 1 mg, Oral, DAILY, 1 mg at 06/22/21 0749 **AND** multivitamin (THERAGRAN) tablet 1 tablet, 1 tablet, Oral, DAILY, Jeremy M Gonda, M.D., 1 tablet at 06/22/21 0749  •  lidocaine (LIDODERM) 5 % 1 Patch, 1 Patch, Transdermal, Q24HR, Jeremy M Gonda, M.D., 1 Patch at 06/21/21 1647  •  labetalol (NORMODYNE/TRANDATE) injection 10-20 mg, 10-20 mg, Intravenous, Q4HRS PRN, Jeremy M Gonda, M.D.  •  enalaprilat (VASOTEC) injection 1.25 mg, 1.25 mg, Intravenous, Q6HRS PRN, Jeremy M Gonda, M.D.  •  lidocaine (XYLOCAINE) 1 % injection 0.5 mL, 0.5 mL, Intradermal, Once PRN, Lauren Carey M.D.     LABS REVIEWED:  Lab Results   Component Value Date/Time    SODIUM 138 06/22/2021 03:30 AM    POTASSIUM 5.3 06/22/2021 03:30 AM    CHLORIDE 106 06/22/2021 03:30 AM    CO2 23 06/22/2021 03:30 AM    GLUCOSE 111 (H) 06/22/2021 03:30 AM    BUN 9 06/22/2021 03:30 AM    CREATININE 0.74 06/22/2021 03:30 AM      Lab Results   Component Value Date/Time    PROTHROMBTM 13.9 06/21/2021 07:42 PM    INR 1.10 06/21/2021 07:42 PM      Lab Results   Component Value Date/Time    WBC 11.0 (H) 06/22/2021 03:30 AM    RBC 4.11 (L) 06/22/2021 03:30 AM    HEMOGLOBIN 13.1 (L) 06/22/2021 03:30 AM    HEMATOCRIT 40.1 (L) 06/22/2021 03:30 AM    MCV 97.6 06/22/2021 03:30 AM    MCH 31.9 06/22/2021 03:30 AM    MCHC 32.7 (L) 06/22/2021 03:30 AM    RDW 49.9 06/22/2021 03:30 AM    PLATELETCT 197 06/22/2021 03:30 AM    MPV 10.1 06/22/2021 03:30 AM    NEUTSPOLYS 82.00 (H) 06/22/2021 03:30 AM    LYMPHOCYTES 9.40 (L) 06/22/2021 03:30 AM    MONOCYTES 7.30 06/22/2021 03:30 AM    EOSINOPHILS 0.60 06/22/2021 03:30 AM    BASOPHILS 0.10 06/22/2021 03:30 AM        IMAGING REVIEWED AND INTERPRETED:    ECHOCARDIOGRAM: 6/21/2021  Moderately reduced left ventricular systolic function. Left ventricular   ejection  fraction is visually estimated to be 35-40%. Hypokinesis of   the mid to apical inferior wall, mid to distal septum and apical wall   segments. Grade I diastolic dysfunction.  Normal right ventricular size and systolic function.  Normal pericardium without effusion.    ANGIOGRAM: reviewed    REVIEW OF SYSTEMS:   Review of Systems   Constitutional: Positive for malaise/fatigue. Negative for chills, fever and weight loss.   HENT: Negative for ear pain, nosebleeds and tinnitus.    Eyes: Negative for double vision, photophobia and pain.   Respiratory: Negative for cough, hemoptysis and shortness of breath.    Cardiovascular: Positive for chest pain. Negative for palpitations, orthopnea, leg swelling and PND.   Gastrointestinal: Negative for abdominal pain, blood in stool, nausea and vomiting.   Genitourinary: Negative for frequency, hematuria and urgency.   Musculoskeletal: Positive for joint pain.   Skin: Negative for rash.   Neurological: Positive for focal weakness. Negative for dizziness, tremors, speech change, seizures and headaches.   Endo/Heme/Allergies: Negative for polydipsia. Does not bruise/bleed easily.   Psychiatric/Behavioral: Positive for memory loss. Negative for hallucinations.       PHYSICAL EXAMINATION:   Physical Exam  Vitals and nursing note reviewed.   Constitutional:       General: He is not in acute distress.     Appearance: He is ill-appearing.   HENT:      Head: Normocephalic and atraumatic.   Eyes:      Pupils: Pupils are equal, round, and reactive to light.   Neck:      Vascular: No JVD.   Cardiovascular:      Rate and Rhythm: Normal rate and regular rhythm.      Heart sounds: Normal heart sounds. No murmur heard.   No friction rub. No gallop.    Pulmonary:      Effort: Pulmonary effort is normal. No respiratory distress.      Breath sounds: Normal breath sounds. No wheezing or rales.   Chest:      Chest wall: Tenderness present.   Abdominal:      General: There is no distension.       "Palpations: Abdomen is soft.      Tenderness: There is no abdominal tenderness. There is no rebound.   Musculoskeletal:         General: No tenderness or deformity.      Cervical back: Neck supple.   Skin:     General: Skin is warm and dry.      Findings: No erythema or rash.   Neurological:      Mental Status: He is alert. He is disoriented.      Gait: Gait abnormal.   Psychiatric:         Mood and Affect: Affect normal.         Cognition and Memory: Memory normal.       CONSTITUTIONAL:  /80   Pulse 76   Temp 36.2 °C (97.1 °F) (Temporal)   Resp 12   Ht 1.778 m (5' 10\")   Wt 118 kg (259 lb 11.2 oz)   SpO2 98%         IMPRESSION:60 year old white man who suffered out of hospital arrest secondary to ischemic cardiomyopathy and 3 vessel CAD. Pt failed my mini mental exam exam secondary to anoxic brain injury.        PLAN:  I recommend against CABG due to poor neuro statis making post op recovery very difficult.      Sincerely,       Tez Almanzar MD, FACS.    I,  Tez Almanzar MD, FACS performed a substantial portion of the EM visit face-to-face with the same patient on the same date of service with Nery Escobar PA-C. I was personally involved in reviewing and interpreting the films and conducted elements of the history and physical exam. I performed all of the medical decision making for the patient. attestation needed.   "

## 2021-06-23 NOTE — DISCHARGE PLANNING
Care Transition Team Discharge Planning    Anticipated Discharge Disposition: to SNF for rehab    Action: Lsw received VM from pt's sister in law Sharon. She updated Lsw regarding the medical information provided at rounds this AM. She believes pt will eventually go to SNF, and is not sure what she is asking, but thinks this Lsw should attend the next MD to MD meeting and let Sharon be part of the conversation via phone.      Barriers to Discharge: TBD    Plan: Lsw will continue to follow, and assist w/ d/c planning.

## 2021-06-23 NOTE — DIETARY
Nutrition Services: Day 2 of admit.  Yasmany Huerta is a 60 y.o. male with admitting DX of Cardiac Arrest with ROSC  Consult received for cardiac rehab diet education. Attempted diet education, but PT/OT in with pt.    CVA 7 years ago, poor historian and poor medical management. Lives @ home, alone.  No CABG per CTS due to neuro status and post-op recovery. Plan high-risk PCI per MD note.  Will likely need SNF.   Unclear if pt will benefit from formal diet education given memory deficits and confusion per documentation. Left handouts OOR.    Please re-consult RD if indicated.

## 2021-06-23 NOTE — CARE PLAN
Problem: Pain - Standard  Goal: Alleviation of pain or a reduction in pain to the patient’s comfort goal  Outcome: Not Progressing     Problem: Knowledge Deficit - Standard  Goal: Patient and family/care givers will demonstrate understanding of plan of care, disease process/condition, diagnostic tests and medications  Outcome: Progressing     Problem: Hemodynamics  Goal: Patient's hemodynamics, fluid balance and neurologic status will be stable or improve  Outcome: Progressing     Problem: Skin Integrity  Goal: Skin integrity is maintained or improved  Outcome: Progressing     Problem: Fall Risk  Goal: Patient will remain free from falls  Outcome: Progressing   The patient is Watcher - Medium risk of patient condition declining or worsening    Shift Goals  Clinical Goals: monitor for seizure activity, monitor BP - sbp < 160, dbp < 110    Progress made toward(s) clinical / shift goals:  Pt educated regarding plan of care and medications. All questions answered.  Pt assessed for pain regularly and medicated PRN per MAR. Fall precautions in place. Bed in lowest position. Non-skid socks in place. Personal possessions within reach. Mobility sign on door. Bed-alarm on. Call light within reach. Pt educated regarding fall prevention and states understanding.      Patient is not progressing towards the following goals:      Problem: Pain - Standard  Goal: Alleviation of pain or a reduction in pain to the patient’s comfort goal  Outcome: Not Progressing

## 2021-06-23 NOTE — ASSESSMENT & PLAN NOTE
Possibly secondary to prolonged downtime, hypoperfusion with anoxic injury  Mental status at baseline for now.

## 2021-06-23 NOTE — THERAPY
Physical Therapy Contact Note    PT cardiac rehab consult received, now pending high risk PCI; will follow up post finalized cardiac intervention for accurate assessment of needs; if mobility/dc evaluation is needed, please place 'PT eval and Treat' order for assist with SNF placement.     Jenna IBARRA, PT,  [phone]

## 2021-06-23 NOTE — PROCEDURES
DATE OF PROCEDURE:  06/22/2021     REFERRING PHYSICIAN:  Renae Albright MD, and Lauren Carey MD     PROCEDURES:  1.  Left heart catheterization.  2.  Coronary angiography.  3.  Left ventriculogram.  4.  Monitor conscious sedation.     PREPROCEDURE DIAGNOSES:  1.  Status post cardiac arrest.  2.  Cardiomyopathy.     POSTPROCEDURE DIAGNOSES:  1.  Multivessel coronary artery disease with high-grade ostial left anterior   descending artery stenosis, occluded mid left anterior descending artery   with distal left-to-left and right-to-left collaterals, high grade ostial diagonal   branch stenosis, high grade proximal and mid circumflex artery stenosis,   high grade mid right coronary artery and ostial posterior descending artery   stenosis.  2.  Reduced left ventricular systolic function with ejection fraction of 45%.  3.  Elevated left ventricular end diastolic pressure.     INDICATIONS:  The patient is a 60-year-old male who suffered a cardiac arrest.    He underwent an echocardiogram, which showed reduced ejection fraction of   35%.  He was scheduled for cardiac catheterization.     DESCRIPTION OF PROCEDURE:  After informed consent was signed by his   brother, the patient was brought to the cardiac catheterization laboratory.  He   was prepped and draped in the usual sterile manner.  The right wrist area was   anesthetized with 2% Xylocaine.  A 6-Jordanian sheath was inserted into the right  radial artery using a modified Seldinger technique.  Intra-arterial verapamil   and nitroglycerin were given.  IV heparin was given.  A 4-Jordanian pigtail   catheter was positioned into the left ventricle.  Left angiography was   performed.  This catheter was removed and exchanged for 4-Jordanian JL3.5   catheter, which was positioned into the left main coronary artery.  Coronary   angiography was performed.  This catheter was removed and exchanged for   4-Jordanian JR4, which was positioned into the right coronary artery.   Coronary  angiography was performed.  The patient tolerated the procedure well.  At the  end of procedure, all catheters and sheaths were removed.  Hemoband was   placed on the right wrist.  He was transferred back to CICU in stable   condition.     HEMODYNAMIC DATA:  Hemodynamic data shows aortic pressures of 140/80 mmHg   with mean of 95 mmHg and /0 mmHg with LVEDP of 30 mmHg.     AORTIC VALVE:  There was no significant gradient noted.     LEFT VENTRICULOGRAM:  10 mL of contrast were delivered for 2 seconds.    Ejection fraction was estimated to be 45%.  There was global hypokinesis   noted.     ANGIOGRAM:  LEFT MAIN CORONARY ARTERY:  Left main coronary artery is a moderate length,   moderate-caliber vessel without significant stenosis.     LEFT ANTERIOR DESCENDING ARTERY:  Left anterior descending artery is a long   moderate caliber vessel with ostial concentric 80% stenosis in the mid portion   following a moderate caliber diagonal branch, which also contains ostial   concentric 99% stenosis.  Mid to distal portion of the vessel fills faint via   left to left and right to left collaterals.     LEFT CIRCUMFLEX ARTERY:  Left circumflex artery is a nondominant moderate   caliber vessel with proximal eccentric 80% stenosis and mid concentric 99%   stenosis.  Beyond this, there is a moderate caliber obtuse marginal branch   noted free of disease.     RIGHT CORONARY ARTERY:  Right coronary artery is a dominant, large caliber   vessel with mid luminal irregularities of 20-30%.  Shortly following, there is   a concentric 99% stenosis.  Distally, there is a long large-caliber posterior   descending artery with ostial concentric 99% stenosis.     IMPRESSION:   1.  Multivessel coronary artery disease with high-grade ostial left anterior   descending artery stenosis, occluded mid left anterior descending artery   with distal left-to-left and right-to-left collaterals, high grade ostial diagonal   branch stenosis, high  grade proximal and mid circumflex artery stenosis,   high grade mid right coronary artery and ostial posterior descending artery   stenosis.  2.  Reduced left ventricular systolic function with ejection fraction of 45%.  3.  Elevated left ventricular end diastolic pressure.     RECOMMENDATIONS:  Recommend CT surgical consult for CABG.    SEDATION TIME: The patient's sedation was managed by myself with continuous   face to face time with the patient for 15 minutes from 15:55 to 16:10.     NOTIFICATION:  CT surgery was notified via Voalteat 16:55, Dr. Lauren Carey was notified at 17:00, Dr. Idris Whitaker at 17:10 and his brother via   phone call at 17:15.        ______________________________  MD SARAH CARDONA/THEA/NOEMÍ    DD:  06/22/2021 17:15  DT:  06/22/2021 17:40    Job#:  [id number]

## 2021-06-23 NOTE — CARE PLAN
Problem: Knowledge Deficit - Standard  Goal: Patient and family/care givers will demonstrate understanding of plan of care, disease process/condition, diagnostic tests and medications  Outcome: Progressing     Problem: Skin Integrity  Goal: Skin integrity is maintained or improved  Outcome: Progressing     Problem: Pain - Standard  Goal: Alleviation of pain or a reduction in pain to the patient’s comfort goal  Outcome: Progressing     Problem: Fall Risk  Goal: Patient will remain free from falls  Outcome: Progressing        The patient is Stable - Low risk of patient condition declining or worsening    Shift Goals  Clinical Goals: monitor for seizure activity, monitor BP - sbp < 160, dbp < 110    Progress made toward(s) clinical / shift goals:  BP stable, LHC today w/ TR removed without difficulty, therapeutic heparin gtt    Patient is not progressing towards the following goals:

## 2021-06-23 NOTE — PROGRESS NOTES
Critical Care Progress Note    Date of admission  6/21/2021    Chief Complaint  60 y.o. male who presented 6/21/2021 with an unknown past medical history [place] when he suffered a witnessed cardiac arrest.  He underwent bystander CPR and AED was placed which defibrillated the patient x1.  Upon arrival by EMS, he remained pulseless and they performed ACLS for an additional 2 rounds before regaining spontaneous circulation.  They transported patient to the emergency department in [place] with an oropharyngeal airway in place.  Upon arrival to the ED, patient removed his own OPA and was very agitated and confused attempting to get out of bed.  He was subsequently intubated for airway protection and to facilitate work-up.  He was started on a propofol, fentanyl, heparin drip and given 300 mg of rectal aspirin.  A CT head and C-spine was performed which were both unremarkable before being transferred to [facility] for higher level of care.  In the emergency department here, patient awakens and follows but remains agitated and was seen by cardiology with plans for echocardiography.  He will not undergo code chill given his neurologic recovery.  No additional history is able to be obtained at this time given patient's current clinical condition.  (Dr Gonda HPI)    Hospital Course  No notes on file    Interval Problem Update  Reviewed last 24 hour events:    [facility] reveal multivessel disease  Cards recommends CVS consult - pending  A&O x2-3  SR 70s  SBp 110-120s  UO good  2 lpm NC  IS 1000  WOB low  Tm 99.1  WBC 8.3  Heparin drip  ASA/Statin    Heplock IF  Await CVS conult  Bmp PENDING -> late labs -replete phosphorus, magnesium and potassium  L weakness    CVS team evaluated patient and felt he is not a candidate for cardiac surgery at this time, risk too elevated    Reviewed with cardiology, patient okay to telemetry     YESTERDAY  Extubated after arrival to [facility] yesterday, LOC ok after cardiac  arrest - 4 min  [place] transfer  A&O x 2  SR 70s  SBp 110-140s  UO good  ECHO 35-40%  Trop peak 582  Heparin drip 17  Xa levels 0.18  3 lpm NC 99%  CXR CM, edema  IS 1000  Tm 101.5  WBC 11, improved  B/c 9/0.74  CT head and spine neg  ASA/statin      LHC today  D/c LR post cath  Recheck lytes  Check R eye      Review of Systems  Review of Systems   Constitutional: Positive for malaise/fatigue (Improved).   HENT: Negative for sore throat.    Eyes: Negative.    Respiratory: Negative for cough, sputum production and shortness of breath (Resolved rest).    Cardiovascular: Positive for chest pain (Improved but persisting, pleuritic chest wall) and leg swelling. Negative for palpitations.   Gastrointestinal: Negative for abdominal pain, blood in stool, diarrhea, nausea (Resolved) and vomiting.   Genitourinary: Negative for dysuria, flank pain and hematuria.   Neurological: Negative for focal weakness and headaches.   Psychiatric/Behavioral: The patient is not nervous/anxious and does not have insomnia.         Vital Signs for last 24 hours   Temp:  [36.2 °C (97.1 °F)-37.3 °C (99.1 °F)] 36.4 °C (97.6 °F)  Pulse:  [68-99] 78  Resp:  [12-27] 21  BP: (101-152)/(56-90) 126/75  SpO2:  [92 %-98 %] 92 %    Hemodynamic parameters for last 24 hours       Respiratory Information for the last 24 hours       Physical Exam   Physical Exam  Vitals reviewed.   Constitutional:       Appearance: He is obese. He is not ill-appearing or toxic-appearing.   HENT:      Mouth/Throat:      Mouth: Mucous membranes are moist.   Eyes:      General: No scleral icterus.     Extraocular Movements: Extraocular movements intact.      Pupils: Pupils are equal, round, and reactive to light.   Cardiovascular:      Rate and Rhythm: Normal rate and regular rhythm.      Pulses: Normal pulses.      Heart sounds: No murmur heard.   No gallop.       Comments: SR  Chest:      Chest wall: Tenderness (Improved) present.   Abdominal:      General: Abdomen is  protuberant. Bowel sounds are normal. There is no distension.      Palpations: Abdomen is soft.      Tenderness: There is no abdominal tenderness. There is no right CVA tenderness, left CVA tenderness or guarding. Negative signs include Fernandez's sign.   Genitourinary:     Comments: Madden  Musculoskeletal:      Cervical back: Neck supple.   Lymphadenopathy:      Cervical: No cervical adenopathy.   Skin:     General: Skin is warm and dry.      Capillary Refill: Capillary refill takes 2 to 3 seconds.      Coloration: Skin is not cyanotic or mottled.      Nails: There is no clubbing.   Neurological:      Comments: Awake and alert, follows, oriented x2 but close with his responses otherwise, short-term memory loss but not long-term seems okay, brainstem reflexes intact, moves all 4         Medications  Current Facility-Administered Medications   Medication Dose Route Frequency Provider Last Rate Last Admin   • potassium phosphate IVPB 30 mmol in 500 mL D5W (premix)  30 mmol Intravenous Once Idris Jc M.D.        Followed by   • potassium phosphate 15 mmol in dextrose 5% 250 mL ivpb  15 mmol Intravenous Once Idris Jc M.D.       • magnesium sulfate IVPB premix 2 g  2 g Intravenous Once Idris Jc M.D. 25 mL/hr at 06/23/21 1240 2 g at 06/23/21 1240   • aspirin EC (ECOTRIN) tablet 81 mg  81 mg Oral DAILY Amirah Corona, A.P.R.N.   81 mg at 06/23/21 0538   • losartan (COZAAR) tablet 25 mg  25 mg Oral Q DAY Amirah Corona, A.P.R.N.   25 mg at 06/23/21 0538   • metoprolol SR (TOPROL XL) tablet 25 mg  25 mg Oral Q DAY Amirah Corona, A.P.R.N.   25 mg at 06/23/21 0539   • Respiratory Therapy Consult   Nebulization Continuous RT Jeremy M Gonda, M.D.       • senna-docusate (PERICOLACE or SENOKOT S) 8.6-50 MG per tablet 2 tablet  2 tablet Enteral Tube BID Jeremy M Gonda, M.D.   2 tablet at 06/23/21 0539    And   • polyethylene glycol/lytes (MIRALAX) PACKET 1 Packet  1 Packet Enteral Tube QDAY PRN Elia IBARRA  Gonda, M.D.        And   • magnesium hydroxide (MILK OF MAGNESIA) suspension 30 mL  30 mL Enteral Tube QDAY PRN Jeremy M Gonda, M.D.        And   • bisacodyl (DULCOLAX) suppository 10 mg  10 mg Rectal QDAY PRN Jeremy M Gonda, M.D.       • MD Alert...ICU Electrolyte Replacement per Pharmacy   Other PHARMACY TO DOSE Jeremy M Gonda, M.D.       • acetaminophen (Tylenol) tablet 650 mg  650 mg Oral Q6HRS PRN Adam Chambers D.O.   650 mg at 06/22/21 2119   • heparin infusion 25,000 units in 500 mL 0.45% NACL  0-30 Units/kg/hr (Adjusted) Intravenous Continuous LIZETTE [place].O. 34.3 mL/hr at 06/23/21 1105 19 Units/kg/hr at 06/23/21 1105   • heparin injection 2,000 Units  2,000 Units Intravenous PRN Adam Chambers D.O.   2,000 Units at 06/23/21 1107   • atorvastatin (LIPITOR) tablet 40 mg  40 mg Oral Q EVENING Adam Chambers D.O.   40 mg at 06/22/21 1742   • ondansetron (ZOFRAN) syringe/vial injection 4 mg  4 mg Intravenous Q4HRS PRN Adam Chambers, D.O.       • ondansetron (ZOFRAN ODT) dispertab 4 mg  4 mg Oral Q4HRS PRN Adam Chambers D.O.       • promethazine (PHENERGAN) tablet 12.5-25 mg  12.5-25 mg Oral Q4HRS PRN Adam Chambers D.O.       • promethazine (PHENERGAN) suppository 12.5-25 mg  12.5-25 mg Rectal Q4HRS PRN Adam Chambers D.O.       • prochlorperazine (COMPAZINE) injection 5-10 mg  5-10 mg Intravenous Q4HRS PRN Adam Chambers D.O.       • insulin regular (HumuLIN R,NovoLIN R) injection  3-18 Units Subcutaneous 4X/DAY ACHS Adam Simsa, D.O.   3 Units at 06/21/21 1311   • thiamine (Vitamin B-1) tablet 100 mg  100 mg Oral DAILY Jeremy M Gonda, M.D.   100 mg at 06/23/21 0538    And   • folic acid (FOLVITE) tablet 1 mg  1 mg Oral DAILY Jeremy M Gonda, M.D.   1 mg at 06/23/21 0539    And   • multivitamin (THERAGRAN) tablet 1 tablet  1 tablet Oral DAILY Jeremy M Gonda, M.D.   1 tablet at 06/23/21 0538   • lidocaine (LIDODERM)  5 % 1 Patch  1 Patch Transdermal Q24HR Jeremy M Gonda, M.D.   1 Patch at 06/22/21 1739   • labetalol (NORMODYNE/TRANDATE) injection 10-20 mg  10-20 mg Intravenous Q4HRS PRN Jeremy M Gonda, M.D.       • enalaprilat (VASOTEC) injection 1.25 mg  1.25 mg Intravenous Q6HRS PRN Jeremy M Gonda, M.D.           Fluids    Intake/Output Summary (Last 24 hours) at 6/23/2021 1412  Last data filed at 6/23/2021 1200  Gross per 24 hour   Intake 4500.14 ml   Output 2100 ml   Net 2400.14 ml       Laboratory  Recent Labs     06/21/21  1046   ISTATAPH 7.456   ISTATAPCO2 29.7   ISTATAPO2 101*   ISTATATCO2 22   PTBBVBX2UJZ 98   ISTATARTHCO3 20.9   ISTATARTBE -2   ISTATTEMP 97.0 F   ISTATFIO2 55   ISTATSPEC Arterial   ISTATAPHTC 7.469   RIPEGPSD1IN 96*     Recent Labs     06/21/21  0415   TROPONINI 0.49*     Recent Labs     06/21/21  0815 06/21/21  0815 06/21/21  1215 06/21/21 2215 06/22/21  0330 06/23/21  0945   SODIUM 142   < >  --  141 138 137   POTASSIUM 4.5   < >  --  4.0 5.3 3.7   CHLORIDE 112   < >  --  107 106 105   CO2 20   < >  --  23 23 26   BUN 12   < >  --  9 9 8   CREATININE 0.83   < >  --  0.76 0.74 0.63   MAGNESIUM 1.8   < > 2.2  --  2.1 1.8   PHOSPHORUS 2.9  --   --   --  2.5 1.4*   CALCIUM 8.2*   < >  --  7.9* 7.8* 8.3*    < > = values in this interval not displayed.     Recent Labs     06/21/21  0415 06/21/21  0415 06/21/21  0815 06/21/21  0815 06/21/21 2215 06/22/21  0330 06/23/21  0945   ALTSGPT 48  --  36  --  39  --   --    ASTSGOT 52*  --  54*  --  74*  --   --    ALKPHOSPHAT 81  --  63  --  73  --   --    TBILIRUBIN 0.2  --  0.3  --  0.5  --   --    GLUCOSE 181*   < > 104*   < > 108* 111* 125*    < > = values in this interval not displayed.     Recent Labs     06/21/21  0415 06/21/21  0415 06/21/21  0626 06/21/21  0815 06/21/21  2215 06/22/21  0330 06/23/21  0550   WBC 14.7*   < > 15.4*  --   --  11.0* 8.3   NEUTSPOLYS  --   --  85.90*  --   --  82.00* 78.80*   LYMPHOCYTES  --   --  6.90*  --   --  9.40* 9.80*    MONOCYTES  --   --  5.80  --   --  7.30 9.00   EOSINOPHILS  --   --  0.10  --   --  0.60 1.60   BASOPHILS  --   --  0.30  --   --  0.10 0.20   ASTSGOT 52*  --   --  54* 74*  --   --    ALTSGPT 48  --   --  36 39  --   --    ALKPHOSPHAT 81  --   --  63 73  --   --    TBILIRUBIN 0.2  --   --  0.3 0.5  --   --     < > = values in this interval not displayed.     Recent Labs     06/21/21  0415 06/21/21  0415 06/21/21  0626 06/21/21  0815 06/21/21  1942 06/22/21  0330 06/23/21  0550   RBC 4.60*   < > 4.21*  --   --  4.11* 3.67*   HEMOGLOBIN 14.9   < > 13.7*  --   --  13.1* 11.6*   HEMATOCRIT 45.8   < > 41.1*  --   --  40.1* 36.0*   PLATELETCT 326   < > 240  --   --  197 181   PROTHROMBTM 10.1  --   --  13.8 13.9  --   --    APTT 21.0*  --   --  25.2 38.2*  --   --    INR 0.95  --   --  1.09 1.10  --   --     < > = values in this interval not displayed.       Imaging  X-Ray:  I have personally reviewed the images and compared with prior images.  EKG:  I have personally reviewed the images and compared with prior images.  CT:    Reviewed  Echo:   Reviewed    Assessment/Plan  * Cardiac arrest (HCC)  Assessment & Plan  Out of hospital arrest with unknown rhythm although likely V. fib/V. tach given AED defibrillation  Cardiology consulted, defer antiarrhythmics to cardiology  Optimize electrolytes  Antiplatelet, continue heparin drip  Echo noted  Left heart catheterization with multivessel disease, medical therapy per CVS  Continuous telemetry monitoring without significant ectopy  Supportive care, not a TTM candidate given intact neurologic status  Hemodynamically, neurologically after respiratory standpoint did well, extubated afternoon day of admission/arrest    Acute respiratory failure with hypoxia (HCC)  Assessment & Plan  Intubated at outside hospital 6/21  Extubated afternoon 6/21  RT/O2 protocol  Limit sedatives  Incentive spirometry, mobilize when okay with cardiology likely post heart catheterization, may need  PT/OT  Forced diuresis when able given signs of developing pulmonary edema  Chest x-ray as needed    Acute encephalopathy  Assessment & Plan  Slowly better but still confused enough to be inappropriate for consent  Secondary to arrest versus alcohol intoxication?  Likely the former, monitoring for withdrawal secondary to the latter  Limit sedatives with close neurologic monitoring  Seizure and aspiration precautions  Suspect mild anoxic injury from arrest, hopefully will improve with time, monitoring  Brother thought his short-term memory was off during conversation with patient and him 6/22    Alcohol intoxication (HCC)  Assessment & Plan  Unknown severity of alcohol use, has not exhibited significant withdrawals  Rally bag/vitamin supplementation  Monitor for alcohol withdrawal syndrome, no so far  Optimize electrolytes    WEN (acute kidney injury) (HCC)  Assessment & Plan  Renal function returned to normal  Likely component of ATN in the setting of cardiac arrest  Avoid nephrotoxins renally dose medications  Monitor creatinine, urine output, electrolytes closely    Hypophosphatemia  Assessment & Plan  Replete, goal greater than 2.5, ERP    Leukemoid reaction  Assessment & Plan  WBC returned to normal  Likely reactive, doubt infection  Continue to monitor off antibiotics for now    Hypomagnesemia  Assessment & Plan  Replete, ERP, goal greater than 2.0    Hyperglycemia  Assessment & Plan  Insulin sliding scale as needed  hemoglobin A1c 5.0  Hypoglycemia protocols    Hypernatremia  Assessment & Plan  Sodium normalized  Euvolemic or volume up clinically  Continue serial BMP       VTE:  Heparin  Ulcer: H2 Antagonist  Lines: None    I have performed a physical exam and reviewed and updated ROS and Plan today (6/23/2021). In review of yesterday's note (6/22/2021), there are no changes except as documented above.     Discussed patient condition and risk of morbidity and/or mortality with Hospitalist, RN, RT, Pharmacy,  Charge nurse / hot rounds, Patient and cardiology and CVS     Case reviewed with Dr. Cornell, patient cleared by cardiology to transfer to telemetry, medical therapy only per CVS, transfer order placed, RCC will sign off

## 2021-06-23 NOTE — ASSESSMENT & PLAN NOTE
Multivessel,  Cardiothoracic surgery feels the patient is too high risk for surgical intervention  Cardiology following for decision making, possible high risk PCI  6/24: Not candidate for surgical intervention per cardiovascular surgery.  Also not a candidate for high risk PCI.  Possibly planning to transfer to tertiary facility for revascularization versus CABG.   Cardiology in contact with Dr. Traylor at [place].  6/26: Since mental status improved, patient could be candidate for CABG.  However, he declined CABG.  Planning for high risk PCI Monday 6/27: High risk PCI tomorrow.  6/28: Left heart cath planned for today.  6/29-status post high risk PCI completed successfully.  Plan for ICD tomorrow  6/30- ICD today. meds titration per cards

## 2021-06-23 NOTE — PROGRESS NOTES
"       [facility] Cardiology Follow-up Note    Date of Service:    6/23/2021      Name:   Yasmany Huerta   YOB: 1960  Age:   60 y.o.  male   MRN:   [id number]      Chief Complaint: cardiac arrest    Attending Provider: Dr. Jc    HPI:  Yasmany Huerta is a 60 y.o. male admitted 6/21/2021 as transfer from [facility] for cardiac arrest.    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 Otherwise poor historian.     Per Dr Albright's consult note, \"Patient was brought into [facility] after [place] and [place].  Law enforcement arrived first and placed an AED which recommended a shock.  Patient was shocked once and had 2 rounds of CPR and regained pulses.  Upon arrival to [facility], he was combative but was able to share his name.  He ended up intubated for airway protection and transferred to [facility].\"    Cath yesterday revealed 3 vessel disease, awaiting CT surgery consult    Interim Events:  6/22/21  - Slow to answer questions, but A&O x2, disoriented to time, and situation  - Personal Telemetry interpretation: SR 70-90's, f PVCs overnight  - Extubated yesterday, 3L NC  - Vital signs: -150's, HR 70-90's  - labs reviewed: recent trop 569  - Heparin drip continues    6/23/21  - Personal Telemetry interpretation: SR 80's, rare PVC's  - Overnight events: Still having MSK chest pain  - Vital signs: -150's, 96% on 1L NC  - labs reviewed: Phos 1.4, Mag 1.8    ROS  Constitutional: + fatigue.  Respiratory:  Denies shortness of breath, + cough.  Cardiovascular: + chest pain with inspiration. denies lower extremity edema.  Denies orthopnea or PND.  : polyuria, no dysuria.  GI:  Denies nausea/vomiting.  No abdominal distention.  Neuro:  Denies dizziness, syncope, forgetful  Hem/lymph: Denies easy bleeding/bruising.      All other review of systems reviewed and negative.    Past medical, surgical, social, and family " history reviewed and unchanged from admission except as noted in HPI.    Medications: Reviewed in MAR  Current Facility-Administered Medications   Medication Dose Frequency Provider Last Rate Last Admin   • aspirin EC (ECOTRIN) tablet 81 mg  81 mg DAILY Amirah Corona, A.P.R.N.   81 mg at 06/23/21 0538   • losartan (COZAAR) tablet 25 mg  25 mg Q DAY Amirah Corona, A.P.R.N.   25 mg at 06/23/21 0538   • metoprolol SR (TOPROL XL) tablet 25 mg  25 mg Q DAY Amirah Corona, A.P.R.N.   25 mg at 06/23/21 0539   • Respiratory Therapy Consult   Continuous RT Jeremy M Gonda, M.D.       • senna-docusate (PERICOLACE or SENOKOT S) 8.6-50 MG per tablet 2 tablet  2 tablet BID Jeremy M Gonda, M.D.   2 tablet at 06/23/21 0539    And   • polyethylene glycol/lytes (MIRALAX) PACKET 1 Packet  1 Packet QDAY PRN Jeremy M Gonda, M.D.        And   • magnesium hydroxide (MILK OF MAGNESIA) suspension 30 mL  30 mL QDAY PRN Jeremy M Gonda, M.D.        And   • bisacodyl (DULCOLAX) suppository 10 mg  10 mg QDAY PRN Jeremy M Gonda, M.D.       • MD Alert...ICU Electrolyte Replacement per Pharmacy   PHARMACY TO DOSE Jeremy M Gonda, M.D.       • acetaminophen (Tylenol) tablet 650 mg  650 mg Q6HRS PRN Adam Chambers D.O.   650 mg at 06/22/21 2119   • heparin infusion 25,000 units in 500 mL 0.45% NACL  0-30 Units/kg/hr (Adjusted) Continuous LIZETTE [place].O. 30.7 mL/hr at 06/23/21 0657 17 Units/kg/hr at 06/23/21 0657   • heparin injection 2,000 Units  2,000 Units PRN LIZETTE [place].O.   2,000 Units at 06/22/21 0426   • atorvastatin (LIPITOR) tablet 40 mg  40 mg Q EVENING Adam Chambers D.O.   40 mg at 06/22/21 1742   • ondansetron (ZOFRAN) syringe/vial injection 4 mg  4 mg Q4HRS PRN Adam Chambers D.O.       • ondansetron (ZOFRAN ODT) dispertab 4 mg  4 mg Q4HRS PRN Adam Chambers D.O.       • promethazine (PHENERGAN) tablet 12.5-25 mg  12.5-25 mg Q4HRS PRN LIZETTE [place].MICHELE.       •  "promethazine (PHENERGAN) suppository 12.5-25 mg  12.5-25 mg Q4HRS PRN CRISSY AlamoO.       • prochlorperazine (COMPAZINE) injection 5-10 mg  5-10 mg Q4HRS PRN Adam Chambers D.O.       • insulin regular (HumuLIN R,NovoLIN R) injection  3-18 Units 4X/DAY ACHCRISSY MarcosOKecia   3 Units at 06/21/21 1311   • thiamine (Vitamin B-1) tablet 100 mg  100 mg DAILY Jeremy M Gonda, M.D.   100 mg at 06/23/21 0538    And   • folic acid (FOLVITE) tablet 1 mg  1 mg DAILY Jeremy M Gonda, M.D.   1 mg at 06/23/21 0539    And   • multivitamin (THERAGRAN) tablet 1 tablet  1 tablet DAILY Jeremy M Gonda, M.D.   1 tablet at 06/23/21 0538   • lidocaine (LIDODERM) 5 % 1 Patch  1 Patch Q24HR Jeremy M Gonda, M.D.   1 Patch at 06/22/21 1739   • labetalol (NORMODYNE/TRANDATE) injection 10-20 mg  10-20 mg Q4HRS PRN Jeremy M Gonda, M.D.       • enalaprilat (VASOTEC) injection 1.25 mg  1.25 mg Q6HRS PRN Jeremy M Gonda, M.D.       Last reviewed on 6/21/2021  1:52 PM by Ashly Fulton R.N.    No Known Allergies    Physical Exam  Body mass index is 37.26 kg/m². /65   Pulse 74   Temp 37.1 °C (98.8 °F) (Temporal)   Resp 20   Ht 1.778 m (5' 10\")   Wt 118 kg (259 lb 11.2 oz)   SpO2 96%    Vitals:    06/23/21 0539 06/23/21 0600 06/23/21 0700 06/23/21 0800   BP: 117/60 117/60 133/75 111/65   Pulse: 80 77 68 74   Resp:  (!) 21 17 20   Temp:  37.1 °C (98.8 °F)     TempSrc:  Temporal     SpO2:  95% 97% 96%   Weight:       Height:        Oxygen Therapy:  Pulse Oximetry: 96 %, O2 (LPM): 2, O2 Delivery Device: Silicone Nasal Cannula    General: no acute distress, obese, ill appearing  Neck: No JVD, no bruits  Lungs: CTAB, diminished at bases, normal effort. Rhonchi, clear with cough  Heart: RRR, normal S1 /S2 no murmur, no rub  Chest: reproducible pain with palpation  EXT: no lower extremity edema, 2+ radial pulses. 2+ pedal pulses.   Abdomen: soft, non tender, non distended  Neurological: No focal deficits, no " facial asymmetry. Not opening R eye.  Normal speech  Psychiatric: Appropriate affect, alert and oriented x 3  Skin: Warm and dry extremities, no rashes    Labs (personally reviewed):     Lab Results   Component Value Date/Time    SODIUM 138 06/22/2021 03:30 AM    POTASSIUM 5.3 06/22/2021 03:30 AM    CHLORIDE 106 06/22/2021 03:30 AM    CO2 23 06/22/2021 03:30 AM    GLUCOSE 111 (H) 06/22/2021 03:30 AM    BUN 9 06/22/2021 03:30 AM    CREATININE 0.74 06/22/2021 03:30 AM     Lab Results   Component Value Date/Time    ALKPHOSPHAT 73 06/21/2021 10:15 PM    ASTSGOT 74 (H) 06/21/2021 10:15 PM    ALTSGPT 39 06/21/2021 10:15 PM    TBILIRUBIN 0.5 06/21/2021 10:15 PM      Lab Results   Component Value Date/Time    CHOLSTRLTOT 151 06/21/2021 08:15 AM    LDL 87 06/21/2021 08:15 AM    HDL 33 (A) 06/21/2021 08:15 AM    TRIGLYCERIDE 155 (H) 06/21/2021 08:15 AM     Results for MARY JANG (MRN [id number]) as of 6/22/2021 07:38   Ref. Range 6/21/2021 04:15 6/21/2021 08:15 6/21/2021 12:15 6/21/2021 22:15 6/22/2021 03:30   Troponin T Latest Ref Range: 6 - 19 ng/L  230 (H) 429 (H) 582 (H) 569 (H)   NT-proBNP Latest Ref Range: 0 - 125 pg/mL 188 (H) 419 (H)          Cardiac Imaging and Procedures Review:      EKG 6/21/21: My Personal interpretation reveals SR 69, no ST changes    Echo 6/21/2021 :  CONCLUSIONS  Moderately reduced left ventricular systolic function. Left ventricular   ejection fraction is visually estimated to be 35-40%. Hypokinesis of   the mid to apical inferior wall, mid to distal septum and apical wall   segments. Grade I diastolic dysfunction.  Normal right ventricular size and systolic function.  Normal pericardium without effusion.    [facility] (today with Dr. Moreira)    Assessment and Medical Decision Making:    Cardiac arrest:   -VT vs Vfib  -Heparin for ACS, has received total of 48 hrs, will stop and place on asa 81mg and plavix 75mg  -LHC showed 3 vessel Dx, CABG recommended  -Per Dr. Almanzar's note, Pt not good CABG  candidate given poor neuro status post cardiac arrest, Pt also has severe L sided weakness post CVA 7 yrs ago making post op recovery very difficult  -Currently discussing possible high risk PCI with interventional cardiologist     Elevated Trop  -trended down    Acute coronary syndrome   -Asa 81mg, plavix 75mg   -High intensity statin, lipitor 40mg  -BB metoprolol XL 25mg- given EF of 35%   -EF 35% will add on losartan 25mg ('s)   -No spirinolactone for now with K+ of 5.3    HFrEF, Stage C, Class II , LVEF 35%  -Heart failure due to obstructive CAD  -ACE-I/ARB/ARNI: Losartan 25mg (can increase if BP's remain elevated)  -Evidence Based Beta-blocker: metoprolol XL 25mg daily  -Aldosterone Antagonist (if EF/<35): Will add on 12.5mg of aldactone now, given K+ of 3.7  -Diuretic: Pt does not appear fluid volume overloaded, will continue to monitor   -Repeat Echo in 3 months, if LVEF not >35%, then will discuss/consider ICD for primary Prevention  -Will need close F/U in heart failure clinic    Thank you for allowing me to participate in this patients care.  Please contact me with any questions or concerns.    Amirah Corona A.P.R.N.   [facility]  [phone]

## 2021-06-24 ENCOUNTER — APPOINTMENT (OUTPATIENT)
Dept: RADIOLOGY | Facility: MEDICAL CENTER | Age: 61
DRG: 224 | End: 2021-06-24
Attending: HOSPITALIST
Payer: MEDICAID

## 2021-06-24 LAB
ALBUMIN SERPL BCP-MCNC: 3.4 G/DL (ref 3.2–4.9)
ALBUMIN/GLOB SERPL: 1.3 G/DL
ALP SERPL-CCNC: 68 U/L (ref 30–99)
ALT SERPL-CCNC: 22 U/L (ref 2–50)
ANION GAP SERPL CALC-SCNC: 10 MMOL/L (ref 7–16)
AST SERPL-CCNC: 31 U/L (ref 12–45)
BILIRUB SERPL-MCNC: 0.8 MG/DL (ref 0.1–1.5)
BUN SERPL-MCNC: 8 MG/DL (ref 8–22)
CALCIUM SERPL-MCNC: 8.1 MG/DL (ref 8.5–10.5)
CHLORIDE SERPL-SCNC: 105 MMOL/L (ref 96–112)
CO2 SERPL-SCNC: 22 MMOL/L (ref 20–33)
CREAT SERPL-MCNC: 0.66 MG/DL (ref 0.5–1.4)
ERYTHROCYTE [DISTWIDTH] IN BLOOD BY AUTOMATED COUNT: 45.2 FL (ref 35.9–50)
GLOBULIN SER CALC-MCNC: 2.7 G/DL (ref 1.9–3.5)
GLUCOSE BLD-MCNC: 105 MG/DL (ref 65–99)
GLUCOSE BLD-MCNC: 115 MG/DL (ref 65–99)
GLUCOSE BLD-MCNC: 116 MG/DL (ref 65–99)
GLUCOSE BLD-MCNC: 97 MG/DL (ref 65–99)
GLUCOSE SERPL-MCNC: 102 MG/DL (ref 65–99)
HCT VFR BLD AUTO: 37.6 % (ref 42–52)
HGB BLD-MCNC: 12.7 G/DL (ref 14–18)
MAGNESIUM SERPL-MCNC: 2 MG/DL (ref 1.5–2.5)
MCH RBC QN AUTO: 31.9 PG (ref 27–33)
MCHC RBC AUTO-ENTMCNC: 33.8 G/DL (ref 33.7–35.3)
MCV RBC AUTO: 94.5 FL (ref 81.4–97.8)
PHOSPHATE SERPL-MCNC: 2.9 MG/DL (ref 2.5–4.5)
PLATELET # BLD AUTO: 158 K/UL (ref 164–446)
PMV BLD AUTO: 10.2 FL (ref 9–12.9)
POTASSIUM SERPL-SCNC: 3.8 MMOL/L (ref 3.6–5.5)
PROT SERPL-MCNC: 6.1 G/DL (ref 6–8.2)
RBC # BLD AUTO: 3.98 M/UL (ref 4.7–6.1)
SODIUM SERPL-SCNC: 137 MMOL/L (ref 135–145)
WBC # BLD AUTO: 8.1 K/UL (ref 4.8–10.8)

## 2021-06-24 PROCEDURE — 84100 ASSAY OF PHOSPHORUS: CPT

## 2021-06-24 PROCEDURE — 80053 COMPREHEN METABOLIC PANEL: CPT

## 2021-06-24 PROCEDURE — 82962 GLUCOSE BLOOD TEST: CPT | Mod: 91

## 2021-06-24 PROCEDURE — 99255 IP/OBS CONSLTJ NEW/EST HI 80: CPT | Performed by: INTERNAL MEDICINE

## 2021-06-24 PROCEDURE — 700102 HCHG RX REV CODE 250 W/ 637 OVERRIDE(OP): Performed by: NURSE PRACTITIONER

## 2021-06-24 PROCEDURE — 700102 HCHG RX REV CODE 250 W/ 637 OVERRIDE(OP): Performed by: INTERNAL MEDICINE

## 2021-06-24 PROCEDURE — A9270 NON-COVERED ITEM OR SERVICE: HCPCS | Performed by: HOSPITALIST

## 2021-06-24 PROCEDURE — 71045 X-RAY EXAM CHEST 1 VIEW: CPT

## 2021-06-24 PROCEDURE — 700102 HCHG RX REV CODE 250 W/ 637 OVERRIDE(OP): Performed by: HOSPITALIST

## 2021-06-24 PROCEDURE — 700101 HCHG RX REV CODE 250: Performed by: INTERNAL MEDICINE

## 2021-06-24 PROCEDURE — 83735 ASSAY OF MAGNESIUM: CPT

## 2021-06-24 PROCEDURE — 36415 COLL VENOUS BLD VENIPUNCTURE: CPT

## 2021-06-24 PROCEDURE — 85027 COMPLETE CBC AUTOMATED: CPT

## 2021-06-24 PROCEDURE — 770020 HCHG ROOM/CARE - TELE (206)

## 2021-06-24 PROCEDURE — 99232 SBSQ HOSP IP/OBS MODERATE 35: CPT | Performed by: STUDENT IN AN ORGANIZED HEALTH CARE EDUCATION/TRAINING PROGRAM

## 2021-06-24 PROCEDURE — 99232 SBSQ HOSP IP/OBS MODERATE 35: CPT | Performed by: FAMILY MEDICINE

## 2021-06-24 PROCEDURE — A9270 NON-COVERED ITEM OR SERVICE: HCPCS | Performed by: NURSE PRACTITIONER

## 2021-06-24 PROCEDURE — 51798 US URINE CAPACITY MEASURE: CPT

## 2021-06-24 PROCEDURE — A9270 NON-COVERED ITEM OR SERVICE: HCPCS | Performed by: INTERNAL MEDICINE

## 2021-06-24 RX ADMIN — Medication 100 MG: at 06:22

## 2021-06-24 RX ADMIN — GUAIFENESIN 600 MG: 600 TABLET, EXTENDED RELEASE ORAL at 17:25

## 2021-06-24 RX ADMIN — ASPIRIN 81 MG: 81 TABLET, COATED ORAL at 06:22

## 2021-06-24 RX ADMIN — ATORVASTATIN CALCIUM 40 MG: 40 TABLET, FILM COATED ORAL at 17:25

## 2021-06-24 RX ADMIN — GUAIFENESIN 600 MG: 600 TABLET, EXTENDED RELEASE ORAL at 06:23

## 2021-06-24 RX ADMIN — LIDOCAINE 1 PATCH: 50 PATCH TOPICAL at 17:33

## 2021-06-24 RX ADMIN — METOPROLOL SUCCINATE 25 MG: 25 TABLET, EXTENDED RELEASE ORAL at 06:22

## 2021-06-24 RX ADMIN — THERA TABS 1 TABLET: TAB at 06:22

## 2021-06-24 RX ADMIN — ACETAMINOPHEN 650 MG: 325 TABLET, FILM COATED ORAL at 03:11

## 2021-06-24 RX ADMIN — LOSARTAN POTASSIUM 25 MG: 25 TABLET, FILM COATED ORAL at 06:23

## 2021-06-24 RX ADMIN — CLOPIDOGREL BISULFATE 75 MG: 75 TABLET ORAL at 06:22

## 2021-06-24 RX ADMIN — ACETAMINOPHEN 650 MG: 325 TABLET, FILM COATED ORAL at 14:07

## 2021-06-24 RX ADMIN — SPIRONOLACTONE 12.5 MG: 25 TABLET ORAL at 06:23

## 2021-06-24 RX ADMIN — FOLIC ACID 1 MG: 1 TABLET ORAL at 06:22

## 2021-06-24 ASSESSMENT — COGNITIVE AND FUNCTIONAL STATUS - GENERAL
MOVING FROM LYING ON BACK TO SITTING ON SIDE OF FLAT BED: A LITTLE
TOILETING: A LITTLE
EATING MEALS: A LITTLE
MOBILITY SCORE: 14
HELP NEEDED FOR BATHING: A LOT
MOVING TO AND FROM BED TO CHAIR: A LITTLE
SUGGESTED CMS G CODE MODIFIER DAILY ACTIVITY: CK
STANDING UP FROM CHAIR USING ARMS: A LITTLE
DRESSING REGULAR UPPER BODY CLOTHING: A LITTLE
CLIMB 3 TO 5 STEPS WITH RAILING: TOTAL
DAILY ACTIVITIY SCORE: 17
WALKING IN HOSPITAL ROOM: TOTAL
DRESSING REGULAR LOWER BODY CLOTHING: A LOT
TURNING FROM BACK TO SIDE WHILE IN FLAT BAD: A LITTLE
SUGGESTED CMS G CODE MODIFIER MOBILITY: CL

## 2021-06-24 ASSESSMENT — FIBROSIS 4 INDEX: FIB4 SCORE: 2.59

## 2021-06-24 ASSESSMENT — PAIN DESCRIPTION - PAIN TYPE: TYPE: ACUTE PAIN

## 2021-06-24 NOTE — PROGRESS NOTES
Pt brought up from [facility] by Rajni YBARRA, Assumed care of PT A&O 3, thought it was 2020. Pt resting in bed with no signs of labored breathing. On 2.5 L. Tele monitor in place, cardiac rhythm being monitored. Call light within reach, bed in lowest position, upper bed rails up. Pt was updated on plan of care for the day . Will continue to monitor.

## 2021-06-24 NOTE — PROGRESS NOTES
Monitor Summary     Rhythm SR  HR Range 65-82  Ectopy Rare PVC  Measurements 0.18 / 0.08 / 0.40     On tele at 6027

## 2021-06-24 NOTE — PROGRESS NOTES
Hospital Medicine Daily Progress Note    Date of Service  6/24/2021    Chief Complaint  60 y.o. male admitted 6/21/2021 with cardiac arrest and a local [place] in the [place].  The patient was shocked by AED x1 and transported to a local emergency room with pulses present at the time.    Hospital Course  This is a 60-year-old male that was brought to an outlying facility after out of hospital arrest and after initial stabilization transferred to [facility] for further definitive intervention.  Reportedly the patient was found down with an unknown amount of downtime, there was bystander CPR and AED was attached with discharge x1.  EMS arrived and placed a airway and started transport.  The patient had additional several rounds of CPR in transit and then had return of neurologic function.  The patient was combative but was able to tell the paramedics his name.  He then was intubated in the emergency room at the [facility] and brought to [facility].  The patient was given heparin and aspirin initially.  The patient reportedly had a history of stroke with left-sided deficits, after initial presentation he improved but remains still with memory deficits.  The patient went for left heart catheterization that was found to have multivessel significant coronary disease.  Cardiothoracic surgery recommends to not proceed with open heart surgery but alternative treatment.  High risk PCI recommended.    Interval Problem Update  Patient seen and examined today.    Patient tolerating treatment and therapies.  All Data, Medication data reviewed.  Case discussed with nursing as available.  Plan of Care reviewed with patient and notified of changes.  6/23 the patient feels tired and is sore in his chest, he still has memory deficits, he has a hard time recalling normal orientation questions.  Alert and oriented x2-3, sinus rhythm in the 70s, blood pressure 110s over 120s, good urine output, 2  L per nasal cannula oxygen, low-grade temperature, 99.1, remains on heparin drip, aspirin, statin.  Per cardiology the patient is okay to go to telemetry.  Electrolytes being replaced,  6/24: Patient in bed comfortable.  Disoriented and confused occasionally.  Overall mentation slightly improved compared to prior as per staff.  Respiratory status stable hemodynamic stable.  Cardiology recommending transferring to tertiary center for high risk PCI versus CABG.  Transfer process has been initiated.  Consultants/Specialty  Critical care  Cardiology  Code Status  Full Code    Disposition  Pending clinical course    Review of Systems  Review of Systems   Unable to perform ROS: Mental status change        Physical Exam  Temp:  [36.4 °C (97.5 °F)-36.8 °C (98.2 °F)] 36.6 °C (97.8 °F)  Pulse:  [69-79] 77  Resp:  [18-24] 18  BP: (127-156)/(66-90) 138/80  SpO2:  [96 %-99 %] 97 %    Physical Exam  Vitals and nursing note reviewed.   Constitutional:       General: He is not in acute distress.     Appearance: He is well-developed. He is obese.      Interventions: Nasal cannula in place.      Comments: Pt seen and examined.   HENT:      Head: Normocephalic and atraumatic.   Eyes:      General:         Right eye: No discharge.      Pupils: Pupils are equal, round, and reactive to light.   Cardiovascular:      Rate and Rhythm: Normal rate.      Heart sounds: Normal heart sounds.   Pulmonary:      Effort: Pulmonary effort is normal.      Breath sounds: Rhonchi and rales present.   Abdominal:      General: Bowel sounds are normal. There is no distension.      Palpations: Abdomen is soft.   Musculoskeletal:         General: Normal range of motion.   Skin:     General: Skin is warm and dry.   Neurological:      Mental Status: He is alert. He is disoriented.      Motor: Weakness present.   Psychiatric:         Behavior: Behavior normal.         Fluids    Intake/Output Summary (Last 24 hours) at 6/24/2021 9542  Last data filed at  6/24/2021 1500  Gross per 24 hour   Intake --   Output 2450 ml   Net -2450 ml       Laboratory  Recent Labs     06/22/21  0330 06/23/21  0550 06/24/21  0144   WBC 11.0* 8.3 8.1   RBC 4.11* 3.67* 3.98*   HEMOGLOBIN 13.1* 11.6* 12.7*   HEMATOCRIT 40.1* 36.0* 37.6*   MCV 97.6 98.1* 94.5   MCH 31.9 31.6 31.9   MCHC 32.7* 32.2* 33.8   RDW 49.9 51.8* 45.2   PLATELETCT 197 181 158*   MPV 10.1 10.4 10.2     Recent Labs     06/22/21  0330 06/23/21  0945 06/24/21  0144   SODIUM 138 137 137   POTASSIUM 5.3 3.7 3.8   CHLORIDE 106 105 105   CO2 23 26 22   GLUCOSE 111* 125* 102*   BUN 9 8 8   CREATININE 0.74 0.63 0.66   CALCIUM 7.8* 8.3* 8.1*     Recent Labs     06/21/21  1942   APTT 38.2*   INR 1.10               Imaging  DX-CHEST-PORTABLE (1 VIEW)   Final Result         Lower lung volume with hypoventilatory change..      DX-CHEST-PORTABLE (1 VIEW)   Final Result      Interval extubation with similar moderate diffuse patchy opacity worrisome for atypical infection or aspiration      EC-ECHOCARDIOGRAM COMPLETE W/O CONT   Final Result      OUTSIDE IMAGES-CT CERVICAL SPINE   Final Result      OUTSIDE IMAGES-CT HEAD   Final Result      OUTSIDE IMAGES-DX CHEST   Final Result      DX-CHEST-PORTABLE (1 VIEW)   Final Result         Intubated.      Patchy bilateral lower lung opacities, atelectasis or infection.      Mildly prominent cardiopericardial silhouette.      CL-LEFT HEART CATHETERIZATION WITH POSSIBLE INTERVENTION    (Results Pending)   US-CAROTID DOPPLER BILAT    (Results Pending)        Assessment/Plan  * Cardiac arrest (HCC)  Assessment & Plan  Patient found with multivessel coronary disease  Continue telemonitoring  Cardiology consulting  Optimization of vascular compromise underway  6/24: EP recommending ICD placement after revascularization.        CVA (cerebral vascular accident) ([facility])  Assessment & Plan  With left-sided deficits, details unknown    Coronary artery disease involving native coronary artery of native  heart  Assessment & Plan  Multivessel,  Cardiothoracic surgery feels the patient is too high risk for surgical intervention  Cardiology following for decision making, possible high risk PCI  6/24: Not candidate for surgical intervention per cardiovascular surgery.  Also not a candidate for high risk PCI.  Possibly planning to transfer to tertiary facility for revascularization versus CABG.    Hypophosphatemia  Assessment & Plan  Replace and recheck    Leukemoid reaction  Assessment & Plan  Resolved, follow    Acute encephalopathy  Assessment & Plan  Possibly secondary to prolonged downtime, hypoperfusion  The patient is slowly improving, monitor    Hypomagnesemia  Assessment & Plan  Replace electrolytes and recheck    Alcohol intoxication (HCC)  Assessment & Plan  Patient presents with an elevated BAL unknown if he is a chronic drinker.    Metabolic acidosis  Assessment & Plan  Resolved, secondary to acute event, hypoperfusion    Hyperglycemia  Assessment & Plan  Low hemoglobin A1c, monitor, insulin per sliding scale    Hypernatremia  Assessment & Plan  Monitor    WEN (acute kidney injury) (HCC)  Assessment & Plan  Improved, follow, avoid further injury    Acute respiratory failure with hypoxia (HCC)  Assessment & Plan  Patient intubated for airway protection following out-of-hospital arrest  Extubated, tolerating,       VTE prophylaxis: Lovenox

## 2021-06-24 NOTE — PROGRESS NOTES
Pt transported to [facility] on monitor by 2 RNs. Care handed off to TATE Choi.

## 2021-06-24 NOTE — PROGRESS NOTES
Indwelling catheter pulled per protocol. Catheter was visibly dirty with dried on excretions.  Puss and blood was noted on the indwelling tube when pulled.  Will monitor for further signs of UTI.

## 2021-06-24 NOTE — CARE PLAN
Problem: Knowledge Deficit - Standard  Goal: Patient and family/care givers will demonstrate understanding of plan of care, disease process/condition, diagnostic tests and medications  Outcome: Progressing  Note: Pt and brother updated on POC. Discussion with cardiology bedside and brother on phone of POC- discussing possible ICD placement at this time. Pt and brother agreeable to POC with questions answered.      Problem: Hemodynamics  Goal: Patient's hemodynamics, fluid balance and neurologic status will be stable or improve  Outcome: Progressing   The patient is Watcher - Medium risk of patient condition declining or worsening    Shift Goals  Clinical Goals: monitor for seizure activity, monitor BP - sbp < 160, dbp < 110

## 2021-06-24 NOTE — PROGRESS NOTES
"       [facility] Cardiology Follow-up Note    Date of Service:    6/24/2021      Name:   Yasmany Huerta   YOB: 1960  Age:   60 y.o.  male   MRN:   [id number]      Chief Complaint: cardiac arrest    Attending Provider: Dr. Jc    HPI:  Yasmany Huerta is a 60 y.o. male admitted 6/21/2021 as transfer from [facility] for cardiac arrest.    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 Otherwise poor historian.     Per Dr Albright's consult note, \"Patient was brought into [facility] after being found down and [place] without a pulse.  Law enforcement arrived first and placed an AED which recommended a shock.  Patient was shocked once and had 2 rounds of CPR and regained pulses.  Upon arrival to [facility], he was combative but was able to share his name.  He ended up intubated for airway protection and transferred to [facility].\"    Cath revealed 3 vessel disease, CT surgery does not feel Pt is a good candidate given poor neuro status post cardiac arrest. Dr. Hunter also reccommends medical management, poor PCI candidate given coronary anatomy and severe triple vessel disease    Interim Events:  6/22/21  - Slow to answer questions, but A&O x2, disoriented to time, and situation  - Personal Telemetry interpretation: SR 70-90's, f PVCs overnight  - Extubated yesterday, 3L NC  - Vital signs: -150's, HR 70-90's  - labs reviewed: recent trop 569  - Heparin drip continues    6/23/21  - Personal Telemetry interpretation: SR 80's, rare PVC's  - Overnight events: Still having MSK chest pain  - Vital signs: -150's, 96% on 1L NC  - labs reviewed: Phos 1.4, Mag 1.8    6/24/21  - Personal Telemetry interpretation: SR 65-82, rare PVC  - Overnight events: downgraded to Tele  - Vital signs: -140,   - labs reviewed: WNL, platelets 158    ROS  Constitutional: + fatigue.  Respiratory:  Denies shortness of breath, + cough.  Cardiovascular: + chest " pain with inspiration. denies lower extremity edema.  Denies orthopnea or PND.  : polyuria, no dysuria.  GI:  Denies nausea/vomiting.  No abdominal distention.  Neuro:  Denies dizziness, syncope, forgetful  Hem/lymph: Denies easy bleeding/bruising.      All other review of systems reviewed and negative.    Past medical, surgical, social, and family history reviewed and unchanged from admission except as noted in HPI.    Medications: Reviewed in MAR  Current Facility-Administered Medications   Medication Dose Frequency Provider Last Rate Last Admin   • spironolactone (ALDACTONE) tablet 12.5 mg  12.5 mg Q DAY Amirah Corona, A.P.R.N.   12.5 mg at 06/24/21 0623   • clopidogrel (PLAVIX) tablet 75 mg  75 mg DAILY Amirah Corona, A.P.R.N.   75 mg at 06/24/21 0622   • guaiFENesin ER (MUCINEX) tablet 600 mg  600 mg Q12HRS Jamir Cornell M.D.   600 mg at 06/24/21 0623   • aspirin EC (ECOTRIN) tablet 81 mg  81 mg DAILY Amirah Corona, A.P.R.N.   81 mg at 06/24/21 0622   • losartan (COZAAR) tablet 25 mg  25 mg Q DAY Amirah Corona, A.P.R.N.   25 mg at 06/24/21 0623   • metoprolol SR (TOPROL XL) tablet 25 mg  25 mg Q DAY Amirah Corona, A.P.R.N.   25 mg at 06/24/21 0622   • Respiratory Therapy Consult   Continuous RT Jeremy M Gonda, M.D.       • senna-docusate (PERICOLACE or SENOKOT S) 8.6-50 MG per tablet 2 tablet  2 tablet BID Jeremy M Gonda, M.D.   2 tablet at 06/23/21 0539    And   • polyethylene glycol/lytes (MIRALAX) PACKET 1 Packet  1 Packet QDAY PRN Jeremy M Gonda, M.D.        And   • magnesium hydroxide (MILK OF MAGNESIA) suspension 30 mL  30 mL QDAY PRN Jeremy M Gonda, M.D.        And   • bisacodyl (DULCOLAX) suppository 10 mg  10 mg QDAY PRN Jeremy M Gonda, M.D.       • MD Alert...ICU Electrolyte Replacement per Pharmacy   PHARMACY TO DOSE Jeremy M Gonda, M.D.       • acetaminophen (Tylenol) tablet 650 mg  650 mg Q6HRS PRN LIZETTE [place].O.   650 mg at 06/24/21 0311   • heparin injection 2,000 Units  " 2,000 Units PRN Adam Chambers D.O.   2,000 Units at 06/23/21 1107   • atorvastatin (LIPITOR) tablet 40 mg  40 mg Q EVENING Adam Chambers D.O.   40 mg at 06/23/21 1731   • ondansetron (ZOFRAN) syringe/vial injection 4 mg  4 mg Q4HRS PRN Adam Chambers D.O.       • ondansetron (ZOFRAN ODT) dispertab 4 mg  4 mg Q4HRS PRN Adam Chambers D.O.       • promethazine (PHENERGAN) tablet 12.5-25 mg  12.5-25 mg Q4HRS PRN Adam Chambers D.O.       • promethazine (PHENERGAN) suppository 12.5-25 mg  12.5-25 mg Q4HRS PRN Adam Chambers D.O.       • prochlorperazine (COMPAZINE) injection 5-10 mg  5-10 mg Q4HRS PRN Adam Chambers D.O.       • insulin regular (HumuLIN R,NovoLIN R) injection  3-18 Units 4X/DAY ACHLIZETTE Marcos.OKecia   3 Units at 06/21/21 1311   • thiamine (Vitamin B-1) tablet 100 mg  100 mg DAILY Jeremy M Gonda, M.D.   100 mg at 06/24/21 0622    And   • folic acid (FOLVITE) tablet 1 mg  1 mg DAILY Jeremy M Gonda, M.D.   1 mg at 06/24/21 0622    And   • multivitamin (THERAGRAN) tablet 1 tablet  1 tablet DAILY Jeremy M Gonda, M.D.   1 tablet at 06/24/21 0622   • lidocaine (LIDODERM) 5 % 1 Patch  1 Patch Q24HR Jeremy M Gonda, M.D.   1 Patch at 06/23/21 1731   • labetalol (NORMODYNE/TRANDATE) injection 10-20 mg  10-20 mg Q4HRS PRN Jeremy M Gonda, M.D.       • enalaprilat (VASOTEC) injection 1.25 mg  1.25 mg Q6HRS PRN Jeremy M Gonda, M.D.       Last reviewed on 6/21/2021  1:52 PM by Ashly Fulton R.N.    No Known Allergies    Physical Exam  Body mass index is 37.26 kg/m². /78   Pulse 78   Temp 36.8 °C (98.2 °F) (Temporal)   Resp 18   Ht 1.778 m (5' 10\")   Wt 118 kg (259 lb 11.2 oz)   SpO2 98%    Vitals:    06/24/21 0009 06/24/21 0336 06/24/21 0621 06/24/21 0722   BP: 156/80 134/74 127/66 134/78   Pulse: 79 69  78   Resp: 20 18  18   Temp: 36.7 °C (98.1 °F) 36.4 °C (97.5 °F)  36.8 °C (98.2 °F)   TempSrc: Temporal Temporal  Temporal "   SpO2: 98% 99%  98%   Weight:       Height:        Oxygen Therapy:  Pulse Oximetry: 98 %, O2 (LPM): 2, O2 Delivery Device: Silicone Nasal Cannula    General: no acute distress, obese, ill appearing  Neck: No JVD, no bruits  Lungs: CTAB, diminished at bases, normal effort. Rhonchi, clear with cough  Heart: RRR, normal S1 /S2 no murmur, no rub  Chest: reproducible pain with palpation  EXT: no lower extremity edema, 2+ radial pulses. 2+ pedal pulses.   Abdomen: soft, non tender, non distended  Neurological: No focal deficits, no facial asymmetry. Not opening R eye.  Normal speech  Psychiatric: Appropriate affect, alert and oriented x 3, disoriented to time, slow to answer questions  Skin: Warm and dry extremities, no rashes    Labs (personally reviewed):     Lab Results   Component Value Date/Time    SODIUM 137 06/24/2021 01:44 AM    POTASSIUM 3.8 06/24/2021 01:44 AM    CHLORIDE 105 06/24/2021 01:44 AM    CO2 22 06/24/2021 01:44 AM    GLUCOSE 102 (H) 06/24/2021 01:44 AM    BUN 8 06/24/2021 01:44 AM    CREATININE 0.66 06/24/2021 01:44 AM     Lab Results   Component Value Date/Time    ALKPHOSPHAT 68 06/24/2021 01:44 AM    ASTSGOT 31 06/24/2021 01:44 AM    ALTSGPT 22 06/24/2021 01:44 AM    TBILIRUBIN 0.8 06/24/2021 01:44 AM      Lab Results   Component Value Date/Time    CHOLSTRLTOT 151 06/21/2021 08:15 AM    LDL 87 06/21/2021 08:15 AM    HDL 33 (A) 06/21/2021 08:15 AM    TRIGLYCERIDE 155 (H) 06/21/2021 08:15 AM     Results for MARY JANG (MRN [id number]) as of 6/22/2021 07:38   Ref. Range 6/21/2021 04:15 6/21/2021 08:15 6/21/2021 12:15 6/21/2021 22:15 6/22/2021 03:30   Troponin T Latest Ref Range: 6 - 19 ng/L  230 (H) 429 (H) 582 (H) 569 (H)   NT-proBNP Latest Ref Range: 0 - 125 pg/mL 188 (H) 419 (H)          Cardiac Imaging and Procedures Review:      EKG 6/21/21: My Personal interpretation reveals SR 69, no ST changes    Echo 6/21/2021 :  CONCLUSIONS  Moderately reduced left ventricular systolic function. Left  ventricular   ejection fraction is visually estimated to be 35-40%. Hypokinesis of   the mid to apical inferior wall, mid to distal septum and apical wall   segments. Grade I diastolic dysfunction.  Normal right ventricular size and systolic function.  Normal pericardium without effusion.    [facility] 6/22/21  IMPRESSION:   1.  Multivessel coronary artery disease with high-grade ostial left anterior   descending artery stenosis, occluded mid left anterior descending artery   with distal left-to-left and right-to-left collaterals, high grade ostial diagonal   branch stenosis, high grade proximal and mid circumflex artery stenosis,   high grade mid right coronary artery and ostial posterior descending artery   stenosis.  2.  Reduced left ventricular systolic function with ejection fraction of 45%.  3.  Elevated left ventricular end diastolic pressure.     RECOMMENDATIONS:  Recommend CT surgical consult for CABG.    Assessment and Medical Decision Making:    Cardiac arrest:   -VT vs Vfib  -asa 81mg and plavix 75mg  -[facility] showed 3 vessel Dx, CABG recommended  -Per Dr. Almanzar's note, Pt not good CABG candidate given poor neuro status post cardiac arrest, Pt also has severe L sided weakness post CVA 7 yrs ago making post op recovery very difficult  -May consider cognitive Eval    -Currently discussing possible high risk PCI with interventional cardiologist, Per Dale not a candidate for high risk PCI  -Working on possible transfer to another facility for revascularization, CABG    Elevated Trop  -trended down    Acute coronary syndrome   -Asa 81mg, plavix 75mg   -High intensity statin, lipitor 40mg  -BB, metoprolol XL 25mg- given EF of 35%   -losartan 25mg (-140)    HFrEF, Stage C, Class II , LVEF 35%  -Heart failure due to obstructive CAD  -ACE-I/ARB/ARNI: Losartan 25mg (can increase if BP's remain elevated)  -Evidence Based Beta-blocker: metoprolol XL 25mg daily  -Aldosterone Antagonist (if EF/<35): Will add on  12.5mg of aldactone now, given K+ of 3.7  -Diuretic: Pt does not appear fluid volume overloaded, will continue to monitor   -Repeat Echo in 3 months, if LVEF not >35%, then will discuss/consider ICD for primary Prevention  -Will need close F/U in heart failure clinic    Thank you for allowing me to participate in this patients care.  Please contact me with any questions or concerns.    JAMES Palacios.   [facility]  [phone]

## 2021-06-24 NOTE — CONSULTS
EP Consult Note    DOS: 6/24/2021    Consulting physician: Lauren Carey MD    Chief complaint/Reason for consult: Cardiac arrest    HPI:  Patient is a 61 yo M from [place]. History of prior stroke with dense L sided hemiparesis. He was [place] slots [place] a [place] when he suffered witnessed cardiac. Underwent bystander CPR and defibrillation with ROSC, unclear whether rhythm was VT/VF. He was then taken to [facility], and then further transferred here to [facility]. Last thing he remembers was playing slots and then waking up here in [facility]. EKG showed low voltage but no evidence of STEMI. Mild troponin elevation. Underwent coronary angiography showing diffuse disease, complete LAD occlusion but severe stenosis of RCA and LCx with reasonable distal targets. CTS evaluated for CABG but neurologic status was a concern. Today he is fairly cogent and understands his current situation.    ROS (+ highlighted in red):  Constitutional: Fevers/chills/fatigue/weightloss  HEENT: Blurry vision/eye pain/sore throat/hearing loss  Respiratory: Shortness of breath/cough  Cardiovascular: Chest pain/palpitations/edema/orthopnea/syncope  GI: Nausea/vomitting/diarrhea  MSK: Arthralgias/myagias/muscle weakness  Skin: Rash/sores  Neurological: Numbness/tremors/vertigo  Endocrine: Excessive thirst/polyuria/cold intolerance/heat intolerance  Psych: Depression/anxiety    PMhx:  Stroke    History reviewed. No pertinent surgical history.    Social History     Socioeconomic History   • Marital status: Single     Spouse name: Not on file   • Number of children: Not on file   • Years of education: Not on file   • Highest education level: Not on file   Occupational History   • Not on file   Tobacco Use   • Smoking status: Never Smoker   • Smokeless tobacco: Never Used   Vaping Use   • Vaping Use: Never used   Substance and Sexual Activity   • Alcohol use: Yes   • Drug use: Never   • Sexual activity: Not on file   Other Topics Concern   • Not on file   Social  History Narrative   • Not on file     Social Determinants of Health     Financial Resource Strain:    • Difficulty of Paying Living Expenses:    Food Insecurity:    • Worried About Running Out of Food in the Last Year:    • Ran Out of Food in the Last Year:    Transportation Needs:    • Lack of Transportation (Medical):    • Lack of Transportation (Non-Medical):    Physical Activity:    • Days of Exercise per Week:    • Minutes of Exercise per Session:    Stress:    • Feeling of Stress :    Social Connections:    • Frequency of Communication with Friends and Family:    • Frequency of Social Gatherings with Friends and Family:    • Attends Buddhist Services:    • Active Member of Clubs or Organizations:    • Attends Club or Organization Meetings:    • Marital Status:    Intimate Partner Violence:    • Fear of Current or Ex-Partner:    • Emotionally Abused:    • Physically Abused:    • Sexually Abused:      History reviewed. No pertinent family history.    No Known Allergies    Current Facility-Administered Medications   Medication Dose Route Frequency Provider Last Rate Last Admin   • spironolactone (ALDACTONE) tablet 12.5 mg  12.5 mg Oral Q DAY Amirah Corona, A.P.R.N.   12.5 mg at 06/24/21 0623   • clopidogrel (PLAVIX) tablet 75 mg  75 mg Oral DAILY Amirah Corona, A.P.R.N.   75 mg at 06/24/21 0622   • guaiFENesin ER (MUCINEX) tablet 600 mg  600 mg Oral Q12HRS Jamir Cornell M.D.   600 mg at 06/24/21 0623   • aspirin EC (ECOTRIN) tablet 81 mg  81 mg Oral DAILY Amirah Corona, A.P.R.N.   81 mg at 06/24/21 0622   • losartan (COZAAR) tablet 25 mg  25 mg Oral Q DAY Amirah Corona, A.P.R.N.   25 mg at 06/24/21 0623   • metoprolol SR (TOPROL XL) tablet 25 mg  25 mg Oral Q DAY Amirah Corona, A.P.R.N.   25 mg at 06/24/21 0622   • Respiratory Therapy Consult   Nebulization Continuous RT Jeremy M Gonda, M.D.       • senna-docusate (PERICOLACE or SENOKOT S) 8.6-50 MG per tablet 2 tablet  2 tablet Enteral Tube BID  Jeremy M Gonda, M.D.   2 tablet at 06/23/21 0539    And   • polyethylene glycol/lytes (MIRALAX) PACKET 1 Packet  1 Packet Enteral Tube QDAY PRN Jeremy M Gonda, M.D.        And   • magnesium hydroxide (MILK OF MAGNESIA) suspension 30 mL  30 mL Enteral Tube QDAY PRN Jeremy M Gonda, M.D.        And   • bisacodyl (DULCOLAX) suppository 10 mg  10 mg Rectal QDAY PRN Jeremy M Gonda, M.D.       • acetaminophen (Tylenol) tablet 650 mg  650 mg Oral Q6HRS PRN Adam Chambers D.O.   650 mg at 06/24/21 0311   • heparin injection 2,000 Units  2,000 Units Intravenous PRN Adam Chambers D.O.   2,000 Units at 06/23/21 1107   • atorvastatin (LIPITOR) tablet 40 mg  40 mg Oral Q EVENING Adam Chambers D.O.   40 mg at 06/23/21 1731   • ondansetron (ZOFRAN) syringe/vial injection 4 mg  4 mg Intravenous Q4HRS PRN Adam Chambers D.O.       • ondansetron (ZOFRAN ODT) dispertab 4 mg  4 mg Oral Q4HRS PRN Adam Chambers, D.O.       • promethazine (PHENERGAN) tablet 12.5-25 mg  12.5-25 mg Oral Q4HRS PRN Adam Chambers D.O.       • promethazine (PHENERGAN) suppository 12.5-25 mg  12.5-25 mg Rectal Q4HRS PRN Adam Chambers D.O.       • prochlorperazine (COMPAZINE) injection 5-10 mg  5-10 mg Intravenous Q4HRS PRN Adam Chambers D.O.       • insulin regular (HumuLIN R,NovoLIN R) injection  3-18 Units Subcutaneous 4X/DAY ACHS Adam Chambers D.O.   3 Units at 06/21/21 1311   • thiamine (Vitamin B-1) tablet 100 mg  100 mg Oral DAILY Jeremy M Gonda, M.D.   100 mg at 06/24/21 0622    And   • folic acid (FOLVITE) tablet 1 mg  1 mg Oral DAILY Jeremy M Gonda, M.D.   1 mg at 06/24/21 0622    And   • multivitamin (THERAGRAN) tablet 1 tablet  1 tablet Oral DAILY Jeremy M Gonda, M.D.   1 tablet at 06/24/21 0622   • lidocaine (LIDODERM) 5 % 1 Patch  1 Patch Transdermal Q24HR Jeremy M Gonda, M.D.   1 Patch at 06/23/21 1731   • labetalol (NORMODYNE/TRANDATE) injection 10-20 mg  10-20  mg Intravenous Q4HRS PRN Jeremy M Gonda, M.D.       • enalaprilat (VASOTEC) injection 1.25 mg  1.25 mg Intravenous Q6HRS PRN Jeremy M Gonda, M.D.           Physical Exam:  Vitals:    06/24/21 0009 06/24/21 0336 06/24/21 0621 06/24/21 0722   BP: 156/80 134/74 127/66 134/78   Pulse: 79 69  78   Resp: 20 18  18   Temp: 36.7 °C (98.1 °F) 36.4 °C (97.5 °F)  36.8 °C (98.2 °F)   TempSrc: Temporal Temporal  Temporal   SpO2: 98% 99%  98%   Weight:       Height:         General appearance: Obese, NAD, conversant   Eyes: anicteric sclerae, moist conjunctivae; no lid-lag; PERRLA  HENT: Atraumatic; oropharynx clear with moist mucous membranes and no mucosal ulcerations; normal hard and soft palate  Neck: Trachea midline; FROM, supple, no thyromegaly or lymphadenopathy  Lungs: CTA, with normal respiratory effort and no intercostal retractions  CV: RRR, no MRGs, no JVD   Abdomen: Soft, non-tender; no masses or HSM  Extremities: No peripheral edema or extremity lymphadenopathy  Skin: Normal temperature, turgor and texture; no rash, ulcers or subcutaneous nodules  Psych: Appropriate affect, alert and oriented to person, place and time    Data:  Labs reviewed    Prior echo/stress results reviewed:  LVEF 35%    Prior cath results reviewed:  Films reviewed as above    CXR interpreted by me:  Patchy bilateral opacities inferiorly    EKG interpreted by me:   Low voltage, inferior MI, sinus    Impression/Plan:  1) VT/VF arrest  2) Multivessel coronary disease  3) History of stroke    -I have reviewed the cath images  -His neurologic status seems to be improving  -Recommend a second look at consideration for revascularization  -Once revasc is determined, I agree class I indication for secondary prevention ICD  -Plan discussed with patient and his brother, all questions were answered    Jes Teague MD

## 2021-06-24 NOTE — PROGRESS NOTES
4 Eyes Skin Assessment Completed by Steph RN and TATE Rascon.    Head Scratch, Swelling and Redness, laceration on right eyebrow  Ears Redness and Blanching  Nose WDL  Mouth WDL  Neck WDL  Breast/Chest WDL  Shoulder Blades WDL  Spine WDL  (R) Arm/Elbow/Hand WDL  (L) Arm/Elbow/Hand WDL  Abdomen WDL, red/purple birthmark on right abdomen  Groin WDL  Scrotum/Coccyx/Buttocks Redness and Blanching  (R) Leg WDL, small minor scratches  (L) Leg WDL  (R) Heel/Foot/Toe WDL  (L) Heel/Foot/Toe WDL          Devices In Places Tele Box and Nasal Cannula      Interventions In Place N/A    Possible Skin Injury No    Pictures Uploaded Into Epic N/A  Wound Consult Placed N/A  RN Wound Prevention Protocol Ordered No

## 2021-06-24 NOTE — PROGRESS NOTES
Bedside report received and patient care assumed. Pt is resting in bed, A&O3, with no complaints of pain, and is on 2L. Tele box on. All fall precautions are in place, belongings at bedside table.  Pt was updated on POC, no questions or concerns. Pt educated on use of call light for assistance. Will continue to monitor.

## 2021-06-24 NOTE — CARE PLAN
Problem: Pain - Standard  Goal: Alleviation of pain or a reduction in pain to the patient’s comfort goal  6/24/2021 0405 by Linda Morrison, R.N.  Outcome: Progressing  6/24/2021 0405 by Linda Morrison, R.N.  Outcome: Progressing     Problem: Infection - Standard  Goal: Patient will remain free from infection  Outcome: Progressing   The patient is Watcher - Medium risk of patient condition declining or worsening    Shift Goals  Clinical Goals: monitor for seizure activity, monitor BP - sbp < 160, dbp < 110    Progress made toward(s) clinical / shift goals: Pt blood pressure 134/74, pts bergeron pulled and signs of UTI being monitored for    Patient is not progressing towards the following goals: Patient's chest is sore from CPR especially with coughing

## 2021-06-25 ENCOUNTER — APPOINTMENT (OUTPATIENT)
Dept: RADIOLOGY | Facility: MEDICAL CENTER | Age: 61
DRG: 224 | End: 2021-06-25
Attending: HOSPITALIST
Payer: MEDICAID

## 2021-06-25 PROBLEM — I25.5 ISCHEMIC CARDIOMYOPATHY: Status: ACTIVE | Noted: 2021-06-25

## 2021-06-25 LAB
GLUCOSE BLD-MCNC: 101 MG/DL (ref 65–99)
GLUCOSE BLD-MCNC: 104 MG/DL (ref 65–99)
GLUCOSE BLD-MCNC: 110 MG/DL (ref 65–99)
GLUCOSE BLD-MCNC: 98 MG/DL (ref 65–99)

## 2021-06-25 PROCEDURE — A9270 NON-COVERED ITEM OR SERVICE: HCPCS | Performed by: INTERNAL MEDICINE

## 2021-06-25 PROCEDURE — 97162 PT EVAL MOD COMPLEX 30 MIN: CPT

## 2021-06-25 PROCEDURE — 700102 HCHG RX REV CODE 250 W/ 637 OVERRIDE(OP): Performed by: NURSE PRACTITIONER

## 2021-06-25 PROCEDURE — 700102 HCHG RX REV CODE 250 W/ 637 OVERRIDE(OP): Performed by: HOSPITALIST

## 2021-06-25 PROCEDURE — 99232 SBSQ HOSP IP/OBS MODERATE 35: CPT | Performed by: STUDENT IN AN ORGANIZED HEALTH CARE EDUCATION/TRAINING PROGRAM

## 2021-06-25 PROCEDURE — A9270 NON-COVERED ITEM OR SERVICE: HCPCS | Performed by: HOSPITALIST

## 2021-06-25 PROCEDURE — 700101 HCHG RX REV CODE 250: Performed by: INTERNAL MEDICINE

## 2021-06-25 PROCEDURE — 99232 SBSQ HOSP IP/OBS MODERATE 35: CPT | Performed by: FAMILY MEDICINE

## 2021-06-25 PROCEDURE — 770020 HCHG ROOM/CARE - TELE (206)

## 2021-06-25 PROCEDURE — 82962 GLUCOSE BLOOD TEST: CPT | Mod: 91

## 2021-06-25 PROCEDURE — 93880 EXTRACRANIAL BILAT STUDY: CPT

## 2021-06-25 PROCEDURE — A9270 NON-COVERED ITEM OR SERVICE: HCPCS | Performed by: NURSE PRACTITIONER

## 2021-06-25 PROCEDURE — 700102 HCHG RX REV CODE 250 W/ 637 OVERRIDE(OP): Performed by: INTERNAL MEDICINE

## 2021-06-25 RX ORDER — METOPROLOL SUCCINATE 50 MG/1
50 TABLET, EXTENDED RELEASE ORAL
Status: DISCONTINUED | OUTPATIENT
Start: 2021-06-26 | End: 2021-06-26

## 2021-06-25 RX ORDER — LOSARTAN POTASSIUM 50 MG/1
50 TABLET ORAL
Status: DISCONTINUED | OUTPATIENT
Start: 2021-06-26 | End: 2021-06-26

## 2021-06-25 RX ORDER — SPIRONOLACTONE 25 MG/1
25 TABLET ORAL
Status: DISCONTINUED | OUTPATIENT
Start: 2021-06-26 | End: 2021-07-01 | Stop reason: HOSPADM

## 2021-06-25 RX ORDER — ATORVASTATIN CALCIUM 80 MG/1
80 TABLET, FILM COATED ORAL EVERY EVENING
Status: DISCONTINUED | OUTPATIENT
Start: 2021-06-25 | End: 2021-07-01 | Stop reason: HOSPADM

## 2021-06-25 RX ADMIN — METOPROLOL SUCCINATE 25 MG: 25 TABLET, EXTENDED RELEASE ORAL at 05:30

## 2021-06-25 RX ADMIN — LOSARTAN POTASSIUM 25 MG: 25 TABLET, FILM COATED ORAL at 05:30

## 2021-06-25 RX ADMIN — SPIRONOLACTONE 12.5 MG: 25 TABLET ORAL at 05:30

## 2021-06-25 RX ADMIN — THERA TABS 1 TABLET: TAB at 05:30

## 2021-06-25 RX ADMIN — ASPIRIN 81 MG: 81 TABLET, COATED ORAL at 05:30

## 2021-06-25 RX ADMIN — GUAIFENESIN 600 MG: 600 TABLET, EXTENDED RELEASE ORAL at 17:19

## 2021-06-25 RX ADMIN — FOLIC ACID 1 MG: 1 TABLET ORAL at 05:30

## 2021-06-25 RX ADMIN — ATORVASTATIN CALCIUM 80 MG: 80 TABLET, FILM COATED ORAL at 17:19

## 2021-06-25 RX ADMIN — CLOPIDOGREL BISULFATE 75 MG: 75 TABLET ORAL at 05:30

## 2021-06-25 RX ADMIN — Medication 100 MG: at 05:30

## 2021-06-25 RX ADMIN — ACETAMINOPHEN 650 MG: 325 TABLET, FILM COATED ORAL at 21:11

## 2021-06-25 RX ADMIN — LIDOCAINE 1 PATCH: 50 PATCH TOPICAL at 17:19

## 2021-06-25 RX ADMIN — GUAIFENESIN 600 MG: 600 TABLET, EXTENDED RELEASE ORAL at 05:30

## 2021-06-25 ASSESSMENT — ENCOUNTER SYMPTOMS
COUGH: 0
WHEEZING: 0
STRIDOR: 0
SHORTNESS OF BREATH: 0
CHOKING: 0
APNEA: 0
CHEST TIGHTNESS: 0

## 2021-06-25 ASSESSMENT — COGNITIVE AND FUNCTIONAL STATUS - GENERAL
MOBILITY SCORE: 11
MOVING TO AND FROM BED TO CHAIR: A LOT
STANDING UP FROM CHAIR USING ARMS: A LOT
TURNING FROM BACK TO SIDE WHILE IN FLAT BAD: A LITTLE
CLIMB 3 TO 5 STEPS WITH RAILING: TOTAL
WALKING IN HOSPITAL ROOM: A LOT
SUGGESTED CMS G CODE MODIFIER MOBILITY: CL
MOVING FROM LYING ON BACK TO SITTING ON SIDE OF FLAT BED: UNABLE

## 2021-06-25 ASSESSMENT — GAIT ASSESSMENTS
ASSISTIVE DEVICE: FRONT WHEEL WALKER
GAIT LEVEL OF ASSIST: MODERATE ASSIST
DISTANCE (FEET): 5

## 2021-06-25 ASSESSMENT — FIBROSIS 4 INDEX
FIB4 SCORE: 2.59
FIB4 SCORE: 2.59

## 2021-06-25 ASSESSMENT — PAIN DESCRIPTION - PAIN TYPE
TYPE: ACUTE PAIN
TYPE: ACUTE PAIN;CHRONIC PAIN

## 2021-06-25 NOTE — PROGRESS NOTES
Bedside report received from nightshift RN; assumed pt care. Pt assessment complete. Pt Aox4. Reviewed plan of care with pt. Pt is Telebox on and rhythm verified. Chart and labs reviewed. Bed in lowest position and two side rails up. Pt educated on call light: call light within reach.

## 2021-06-25 NOTE — CARE PLAN
The patient is Stable - Low risk of patient condition declining or worsening    Shift Goals  Clinical Goals: rest, eat, regain strength   Patient Goals: firm up stool  Family Goals: na    Progress made toward(s) clinical / shift goals:    Problem: Knowledge Deficit - Standard  Goal: Patient and family/care givers will demonstrate understanding of plan of care, disease process/condition, diagnostic tests and medications  Outcome: Progressing     Problem: Hemodynamics  Goal: Patient's hemodynamics, fluid balance and neurologic status will be stable or improve  Outcome: Progressing     Problem: Fluid Volume  Goal: Fluid volume balance will be maintained  Outcome: Progressing     Problem: Respiratory  Goal: Patient will achieve/maintain optimum respiratory ventilation and gas exchange  Outcome: Progressing       Patient is not progressing towards the following goals:

## 2021-06-25 NOTE — THERAPY
Missed Therapy     Patient Name: Yasmany Huerta  Age:  60 y.o., Sex:  male  Medical Record #: [id number]  Today's Date: 6/25/2021    Pt pending high risk PCI; will follow up post finalized cardiac intervention for accurate assessment of needs, hold OT eval at this time.      Prudencio Garcia OTD, OTR/L

## 2021-06-25 NOTE — PROGRESS NOTES
Cardiology Follow Up Progress Note    Date of Service  6/25/2021    Attending Physician  Vu Butler M.D.    Chief Complaint     DON Huerta is a 60 y.o. male with a PMH significant for CVA 7 years ago (left-sided hemiparesis) suffered a witnessed cardiac arrest at the [facility], underwent bystander CPR, AED was placed, shocked x1, in ER underwent additional rounds of ACLS with regaining ROSC.    Interim Events    No overnight cardiac events  Telemetry SR  Ambulated with PT this morning, with walker  Acute encephalopathy improved, at baseline  No recurrent chest pain  Hypertensive this morning, uptitrate cardiac meds for target blood pressure.  Labs reviewed  Sodium, potassium, creatinine stable        Review of Systems  Review of Systems   Respiratory: Negative for apnea, cough, choking, chest tightness, shortness of breath, wheezing and stridor.        Vital signs in last 24 hours  Temp:  [36 °C (96.8 °F)-36.8 °C (98.2 °F)] 36.4 °C (97.6 °F)  Pulse:  [66-82] 66  Resp:  [17-18] 18  BP: (138-154)/(66-82) 154/80  SpO2:  [92 %-97 %] 93 %    Physical Exam  Physical Exam  Cardiovascular:      Rate and Rhythm: Normal rate and regular rhythm.      Pulses: Normal pulses.   Pulmonary:      Effort: Pulmonary effort is normal.   Abdominal:      General: Abdomen is flat.   Skin:     General: Skin is warm.      Comments: Right radial cath site without evidence of hematoma   Neurological:      General: No focal deficit present.      Mental Status: He is alert.   Psychiatric:         Mood and Affect: Mood normal.         Lab Review  Lab Results   Component Value Date/Time    WBC 8.1 06/24/2021 01:44 AM    RBC 3.98 (L) 06/24/2021 01:44 AM    HEMOGLOBIN 12.7 (L) 06/24/2021 01:44 AM    HEMATOCRIT 37.6 (L) 06/24/2021 01:44 AM    MCV 94.5 06/24/2021 01:44 AM    MCH 31.9 06/24/2021 01:44 AM    MCHC 33.8 06/24/2021 01:44 AM    MPV 10.2 06/24/2021 01:44 AM      Lab Results   Component Value Date/Time    SODIUM 137  06/24/2021 01:44 AM    POTASSIUM 3.8 06/24/2021 01:44 AM    CHLORIDE 105 06/24/2021 01:44 AM    CO2 22 06/24/2021 01:44 AM    GLUCOSE 102 (H) 06/24/2021 01:44 AM    BUN 8 06/24/2021 01:44 AM    CREATININE 0.66 06/24/2021 01:44 AM      Lab Results   Component Value Date/Time    ASTSGOT 31 06/24/2021 01:44 AM    ALTSGPT 22 06/24/2021 01:44 AM     Lab Results   Component Value Date/Time    CHOLSTRLTOT 151 06/21/2021 08:15 AM    LDL 87 06/21/2021 08:15 AM    HDL 33 (A) 06/21/2021 08:15 AM    TRIGLYCERIDE 155 (H) 06/21/2021 08:15 AM    TROPONINT 569 (H) 06/22/2021 03:30 AM       No results for input(s): NTPROBNP in the last 72 hours.    Cardiac Imaging and Procedures Review  EKG: Small inferior Q waves    Echocardiogram: 6/21/2021  Moderately reduced left ventricular systolic function. Left ventricular   ejection fraction is visually estimated to be 35-40%. Hypokinesis of   the mid to apical inferior wall, mid to distal septum and apical wall   segments. Grade I diastolic dysfunction.  Normal right ventricular size and systolic function.  Normal pericardium without effusion.        Cardiac Catheterization:   6/22/2021  Multivessel CAD  High-grade ostial LAD stenosis, occluded mid LAD with distal left to left and right-to-left collaterals, high-grade ostial diagonal stenosis, high-grade proximal and mid circumflex stenosis, high-grade mid RCA and ostial posterior descending artery stenosis.    Imaging  Chest X-Ray: Lower lung volume with hypoventilatory change     Stress Test: Not applicable    Assessment/Plan    OOH Cardiac arrest, VT/VF  -Multivessel CAD by left heart cath 6/22/2021.  -Appreciate CTS consult, recommended against  CABG secondary to high risk for surgical intervention and  poor neuro status.  -Awaiting transferring to tertiary center for high risk PCI versus CABG.  -Appreciate EP recommendation for ICD placement after revascularization.  -Hypertensive this morning, will uptitrate cardiac meds.  -Continue  with aspirin, Plavix, Lipitor 80, uptitrate to Toprol-XL 50, spironolactone 2 25, losartan to 50.  -Monitor BMP in a.m.    Ischemic cardiomyopathy, LVEF 35 to 40%  -Continue with GDMT  -Toprol-XL, spironolactone, losartan  -Appears euvolemic    Acute encephalopathy  -Neurological status slowly improving    Alcohol intoxication on arrival by lab   -Unknown severity of alcohol use    WEN  -Resolved    History of CVA 7 years ago  -Left-sided deficit      Cardiology will follow along.  Will discuss with CTS to reevaluate patient now that his encephalopathy has resolved and patient's neuro status is at baseline.      Thank you for allowing me to participate in the care of this patient.      Please contact me with any questions.    YANELIS Lu.   Cardiologist, [facility]  [phone]       car

## 2021-06-25 NOTE — CARE PLAN
Problem: Hemodynamics  Goal: Patient's hemodynamics, fluid balance and neurologic status will be stable or improve  Outcome: Progressing  Note: Pt still unsure of date, POC updated daily for pt to use resources properly.      Problem: Skin Integrity  Goal: Skin integrity is maintained or improved  Outcome: Progressing     The patient is Watcher - Medium risk of patient condition declining or worsening    Shift Goals  Clinical Goals: monitor for seizure activity, monitor BP - sbp < 160, dbp < 110    Progress made toward(s) clinical / shift goals:  yes    Patient is not progressing towards the following goals:N/A

## 2021-06-26 LAB
ANION GAP SERPL CALC-SCNC: 10 MMOL/L (ref 7–16)
BUN SERPL-MCNC: 9 MG/DL (ref 8–22)
CALCIUM SERPL-MCNC: 9.1 MG/DL (ref 8.5–10.5)
CHLORIDE SERPL-SCNC: 105 MMOL/L (ref 96–112)
CO2 SERPL-SCNC: 23 MMOL/L (ref 20–33)
CREAT SERPL-MCNC: 0.73 MG/DL (ref 0.5–1.4)
GLUCOSE BLD-MCNC: 110 MG/DL (ref 65–99)
GLUCOSE SERPL-MCNC: 116 MG/DL (ref 65–99)
POTASSIUM SERPL-SCNC: 4 MMOL/L (ref 3.6–5.5)
SODIUM SERPL-SCNC: 138 MMOL/L (ref 135–145)

## 2021-06-26 PROCEDURE — 770020 HCHG ROOM/CARE - TELE (206)

## 2021-06-26 PROCEDURE — 99232 SBSQ HOSP IP/OBS MODERATE 35: CPT | Performed by: FAMILY MEDICINE

## 2021-06-26 PROCEDURE — A9270 NON-COVERED ITEM OR SERVICE: HCPCS | Performed by: FAMILY MEDICINE

## 2021-06-26 PROCEDURE — 700102 HCHG RX REV CODE 250 W/ 637 OVERRIDE(OP): Performed by: FAMILY MEDICINE

## 2021-06-26 PROCEDURE — 700102 HCHG RX REV CODE 250 W/ 637 OVERRIDE(OP): Performed by: HOSPITALIST

## 2021-06-26 PROCEDURE — A9270 NON-COVERED ITEM OR SERVICE: HCPCS | Performed by: NURSE PRACTITIONER

## 2021-06-26 PROCEDURE — A9270 NON-COVERED ITEM OR SERVICE: HCPCS | Performed by: HOSPITALIST

## 2021-06-26 PROCEDURE — 82962 GLUCOSE BLOOD TEST: CPT

## 2021-06-26 PROCEDURE — 700102 HCHG RX REV CODE 250 W/ 637 OVERRIDE(OP): Performed by: NURSE PRACTITIONER

## 2021-06-26 PROCEDURE — A9270 NON-COVERED ITEM OR SERVICE: HCPCS | Performed by: INTERNAL MEDICINE

## 2021-06-26 PROCEDURE — 80048 BASIC METABOLIC PNL TOTAL CA: CPT

## 2021-06-26 PROCEDURE — 99232 SBSQ HOSP IP/OBS MODERATE 35: CPT | Performed by: STUDENT IN AN ORGANIZED HEALTH CARE EDUCATION/TRAINING PROGRAM

## 2021-06-26 PROCEDURE — 36415 COLL VENOUS BLD VENIPUNCTURE: CPT

## 2021-06-26 PROCEDURE — 700102 HCHG RX REV CODE 250 W/ 637 OVERRIDE(OP): Performed by: INTERNAL MEDICINE

## 2021-06-26 PROCEDURE — 700101 HCHG RX REV CODE 250: Performed by: INTERNAL MEDICINE

## 2021-06-26 RX ORDER — LOSARTAN POTASSIUM 50 MG/1
50 TABLET ORAL ONCE
Status: COMPLETED | OUTPATIENT
Start: 2021-06-26 | End: 2021-06-26

## 2021-06-26 RX ORDER — LOSARTAN POTASSIUM 50 MG/1
100 TABLET ORAL
Status: DISCONTINUED | OUTPATIENT
Start: 2021-06-27 | End: 2021-06-29

## 2021-06-26 RX ADMIN — GUAIFENESIN 600 MG: 600 TABLET, EXTENDED RELEASE ORAL at 06:11

## 2021-06-26 RX ADMIN — GUAIFENESIN 600 MG: 600 TABLET, EXTENDED RELEASE ORAL at 17:00

## 2021-06-26 RX ADMIN — CLOPIDOGREL BISULFATE 75 MG: 75 TABLET ORAL at 06:12

## 2021-06-26 RX ADMIN — ACETAMINOPHEN 650 MG: 325 TABLET, FILM COATED ORAL at 23:25

## 2021-06-26 RX ADMIN — ATORVASTATIN CALCIUM 80 MG: 80 TABLET, FILM COATED ORAL at 17:00

## 2021-06-26 RX ADMIN — ASPIRIN 81 MG: 81 TABLET, COATED ORAL at 06:11

## 2021-06-26 RX ADMIN — DOCUSATE SODIUM 50 MG AND SENNOSIDES 8.6 MG 2 TABLET: 8.6; 5 TABLET, FILM COATED ORAL at 17:00

## 2021-06-26 RX ADMIN — SPIRONOLACTONE 25 MG: 25 TABLET ORAL at 06:11

## 2021-06-26 RX ADMIN — LIDOCAINE 1 PATCH: 50 PATCH TOPICAL at 17:00

## 2021-06-26 RX ADMIN — LABETALOL HYDROCHLORIDE 10 MG: 5 INJECTION, SOLUTION INTRAVENOUS at 15:50

## 2021-06-26 RX ADMIN — ACETAMINOPHEN 650 MG: 325 TABLET, FILM COATED ORAL at 17:12

## 2021-06-26 RX ADMIN — METOPROLOL SUCCINATE 50 MG: 50 TABLET, EXTENDED RELEASE ORAL at 06:11

## 2021-06-26 RX ADMIN — LOSARTAN POTASSIUM 50 MG: 50 TABLET, FILM COATED ORAL at 06:11

## 2021-06-26 RX ADMIN — PSYLLIUM HUSK 1 PACKET: 3.4 POWDER ORAL at 06:12

## 2021-06-26 RX ADMIN — LOSARTAN POTASSIUM 50 MG: 50 TABLET, FILM COATED ORAL at 12:52

## 2021-06-26 ASSESSMENT — ENCOUNTER SYMPTOMS
SHORTNESS OF BREATH: 0
CHOKING: 0
HEMOPTYSIS: 0
DEPRESSION: 0
FEVER: 0
CHILLS: 0
MYALGIAS: 0
NECK PAIN: 0
BLURRED VISION: 0
VOMITING: 0
COUGH: 0
APNEA: 0
HEARTBURN: 0
NAUSEA: 0
STRIDOR: 0
CHEST TIGHTNESS: 0
WHEEZING: 0
PHOTOPHOBIA: 0
PALPITATIONS: 0
DOUBLE VISION: 0
WEAKNESS: 1

## 2021-06-26 ASSESSMENT — FIBROSIS 4 INDEX
FIB4 SCORE: 2.59
FIB4 SCORE: 2.59

## 2021-06-26 ASSESSMENT — PAIN DESCRIPTION - PAIN TYPE: TYPE: ACUTE PAIN
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
## 2021-06-26 NOTE — PROGRESS NOTES
Cardiology Follow Up Progress Note    Date of Service  6/26/2021    Attending Physician  Vu Butler M.D.    Chief Complaint     Cardiac arrest     HPI  Yasmany Huerta is a 60 y.o. male with a PMH significant for CVA 7 years ago (left-sided hemiparesis) suffered a witnessed cardiac arrest at the [facility], underwent bystander CPR, AED was placed, shocked x1, in ER underwent additional rounds of ACLS with regaining ROSC.    Interim Events    No overnight cardiac events  Telemetry SR  Encephalopathy improved  No recurrent chest pain  Hypertensive, uptitrate meds for target BP<130/80  Labs reviewed  Sodium, potassium, creatinine stable        Review of Systems  Review of Systems   Respiratory: Negative for apnea, cough, choking, chest tightness, shortness of breath, wheezing and stridor.        Vital signs in last 24 hours  Temp:  [36.1 °C (97 °F)-37.4 °C (99.3 °F)] 37.4 °C (99.3 °F)  Pulse:  [70-89] 70  Resp:  [18] 18  BP: (141-157)/(70-98) 151/86  SpO2:  [93 %-98 %] 98 %    Physical Exam  Physical Exam  Cardiovascular:      Rate and Rhythm: Normal rate and regular rhythm.      Pulses: Normal pulses.   Pulmonary:      Effort: Pulmonary effort is normal.   Abdominal:      General: Abdomen is flat.   Skin:     General: Skin is warm.      Comments: Right radial cath site without evidence of hematoma   Neurological:      General: No focal deficit present.      Mental Status: He is alert.   Psychiatric:         Mood and Affect: Mood normal.         Lab Review  Lab Results   Component Value Date/Time    WBC 8.1 06/24/2021 01:44 AM    RBC 3.98 (L) 06/24/2021 01:44 AM    HEMOGLOBIN 12.7 (L) 06/24/2021 01:44 AM    HEMATOCRIT 37.6 (L) 06/24/2021 01:44 AM    MCV 94.5 06/24/2021 01:44 AM    MCH 31.9 06/24/2021 01:44 AM    MCHC 33.8 06/24/2021 01:44 AM    MPV 10.2 06/24/2021 01:44 AM      Lab Results   Component Value Date/Time    SODIUM 137 06/24/2021 01:44 AM    POTASSIUM 3.8 06/24/2021 01:44 AM    CHLORIDE 105 06/24/2021  01:44 AM    CO2 22 06/24/2021 01:44 AM    GLUCOSE 102 (H) 06/24/2021 01:44 AM    BUN 8 06/24/2021 01:44 AM    CREATININE 0.66 06/24/2021 01:44 AM      Lab Results   Component Value Date/Time    ASTSGOT 31 06/24/2021 01:44 AM    ALTSGPT 22 06/24/2021 01:44 AM     Lab Results   Component Value Date/Time    CHOLSTRLTOT 151 06/21/2021 08:15 AM    LDL 87 06/21/2021 08:15 AM    HDL 33 (A) 06/21/2021 08:15 AM    TRIGLYCERIDE 155 (H) 06/21/2021 08:15 AM    TROPONINT 569 (H) 06/22/2021 03:30 AM       No results for input(s): NTPROBNP in the last 72 hours.    Cardiac Imaging and Procedures Review  EKG: Small inferior Q waves    Echocardiogram: 6/21/2021  Moderately reduced left ventricular systolic function. Left ventricular   ejection fraction is visually estimated to be 35-40%. Hypokinesis of   the mid to apical inferior wall, mid to distal septum and apical wall   segments. Grade I diastolic dysfunction.  Normal right ventricular size and systolic function.  Normal pericardium without effusion.        Cardiac Catheterization:   6/22/2021  Multivessel CAD  High-grade ostial LAD stenosis, occluded mid LAD with distal left to left and right-to-left collaterals, high-grade ostial diagonal stenosis, high-grade proximal and mid circumflex stenosis, high-grade mid RCA and ostial posterior descending artery stenosis.    Imaging  Chest X-Ray: Lower lung volume with hypoventilatory change     Stress Test: Not applicable    Assessment/Plan    OOH Cardiac arrest, VT/VF  -Multivessel CAD by left heart cath 6/22/2021.  -Reconsulted CTS to reevaluate the patient now that he is neurologically at his baseline, however patient declined CABG at this time and prefers to undergo high risk PCI, will discuss high risk PCI with our interventionist on Monday.  -We will hold off transferring to tertiary center at this time.  -Appreciate EP recommendation for ICD placement after revascularization.  -Continue with optimal guided medical  therapy  -Aspirin, Plavix, Lipitor 80, uptitrate to Toprol-XL 75, spironolactone 25, losartan 75.  -Monitor BMP in a.m.    Ischemic cardiomyopathy, LVEF 35 to 40%  -Continue with GDMT  -Toprol-XL, spironolactone, losartan  -Appears euvolemic    Acute encephalopathy  -Neurological status slowly improving    Alcohol intoxication on arrival by lab   -Unknown severity of alcohol use    WEN  -Resolved    History of CVA 7 years ago  -Left-sided deficit    Cardiology will follow along      Thank you for allowing me to participate in the care of this patient.      Please contact me with any questions.    YANELIS Lu.   Cardiologist, [facility]  [phone]

## 2021-06-26 NOTE — CARE PLAN
Problem: Knowledge Deficit - Standard  Goal: Patient and family/care givers will demonstrate understanding of plan of care, disease process/condition, diagnostic tests and medications  Outcome: Progressing     Problem: Fall Risk  Goal: Patient will remain free from falls  Outcome: Progressing
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
## 2021-06-26 NOTE — PROGRESS NOTES
Hospital Medicine Daily Progress Note    Date of Service  6/26/2021    Chief Complaint  60 y.o. male admitted 6/21/2021 with cardiac arrest and a local casino in the [place] area.  The patient was shocked by AED x1 and transported to a local emergency room with pulses present at the time.    Hospital Course  This is a 60-year-old male that was brought to an outlying facility after out of hospital arrest and after initial stabilization transferred to [facility] for further definitive intervention.  Reportedly the patient was found down with an unknown amount of downtime, there was bystander CPR and AED was attached with discharge x1.  EMS arrived and placed a airway and started transport.  The patient had additional several rounds of CPR in transit and then had return of neurologic function.  The patient was combative but was able to tell the paramedics his name.  He then was intubated in the emergency room at the [facility] and brought to [facility].  The patient was given heparin and aspirin initially.  The patient reportedly had a history of stroke with left-sided deficits, after initial presentation he improved but remains still with memory deficits.  The patient went for left heart catheterization that was found to have multivessel significant coronary disease.  Cardiothoracic surgery recommends to not proceed with open heart surgery but alternative treatment.  High risk PCI recommended.    Interval Problem Update  Patient seen and examined today.    Patient tolerating treatment and therapies.  All Data, Medication data reviewed.  Case discussed with nursing as available.  Plan of Care reviewed with patient and notified of changes.  6/23 the patient feels tired and is sore in his chest, he still has memory deficits, he has a hard time recalling normal orientation questions.  Alert and oriented x2-3, sinus rhythm in the 70s, blood pressure 110s over 120s, good urine output, 2  L per nasal cannula oxygen, low-grade temperature, 99.1, remains on heparin drip, aspirin, statin.  Per cardiology the patient is okay to go to telemetry.  Electrolytes being replaced,  6/24: Patient in bed comfortable.  Disoriented and confused occasionally.  Overall mentation slightly improved compared to prior as per staff.  Respiratory status stable hemodynamic stable.  Cardiology recommending transferring to tertiary center for high risk PCI versus CABG.  Transfer process has been initiated.  6/25: Resting in bed comfortable.  Awake and alert.  Oriented x4.  Hemodynamically stable.  Denies any chest pain this morning.  Ambulatory in the room.  Discussed the case with cardiology Dr. Carey who was in contact with Dr. Traylor at staff for for possible transfer.  6/26: Mental status improved and back to baseline.  Per cardiology, CABG can be considered at this point.  However, patient declined.  Planning for high risk PCI on Monday.  Transfer to [place] has been declined due to insurance issues.  Has been ambulatory in the room and down the collado.  No acute distress noted.  No issues overnight per staff.  Consultants/Specialty  Critical care  Cardiology  Code Status  Full Code    Disposition  Pending clinical course  Pending transferring to [place] if accepted.  Review of Systems  Review of Systems   Constitutional: Negative for chills and fever.   HENT: Negative for ear pain, hearing loss and tinnitus.    Eyes: Negative for blurred vision, double vision and photophobia.   Respiratory: Negative for cough and hemoptysis.    Cardiovascular: Negative for chest pain and palpitations.   Gastrointestinal: Negative for heartburn, nausea and vomiting.   Genitourinary: Negative for dysuria and urgency.   Musculoskeletal: Negative for myalgias and neck pain.   Skin: Negative for rash.   Neurological: Positive for weakness.   Psychiatric/Behavioral: Negative for depression and suicidal ideas.        Physical Exam  Temp:   [36.1 °C (97 °F)-37.4 °C (99.3 °F)] 36.2 °C (97.2 °F)  Pulse:  [70-89] 76  Resp:  [17-18] 17  BP: (141-171)/(70-99) 171/99  SpO2:  [93 %-98 %] 95 %    Physical Exam  Vitals and nursing note reviewed.   Constitutional:       General: He is not in acute distress.     Appearance: He is well-developed. He is obese.      Interventions: Nasal cannula in place.      Comments: Pt seen and examined.   HENT:      Head: Normocephalic and atraumatic.   Eyes:      General:         Right eye: No discharge.         Left eye: No discharge.      Pupils: Pupils are equal, round, and reactive to light.   Cardiovascular:      Rate and Rhythm: Normal rate.      Heart sounds: Normal heart sounds.   Pulmonary:      Effort: Pulmonary effort is normal.      Breath sounds: Rhonchi and rales present.   Abdominal:      General: Bowel sounds are normal. There is no distension.      Palpations: Abdomen is soft.   Musculoskeletal:         General: Normal range of motion.   Skin:     General: Skin is warm and dry.   Neurological:      Mental Status: He is alert and oriented to person, place, and time.      Motor: Weakness present.   Psychiatric:         Mood and Affect: Mood normal.         Behavior: Behavior normal.         Fluids    Intake/Output Summary (Last 24 hours) at 6/26/2021 1654  Last data filed at 6/26/2021 0819  Gross per 24 hour   Intake --   Output 600 ml   Net -600 ml       Laboratory  Recent Labs     06/24/21  0144   WBC 8.1   RBC 3.98*   HEMOGLOBIN 12.7*   HEMATOCRIT 37.6*   MCV 94.5   MCH 31.9   MCHC 33.8   RDW 45.2   PLATELETCT 158*   MPV 10.2     Recent Labs     06/24/21  0144 06/26/21  1144   SODIUM 137 138   POTASSIUM 3.8 4.0   CHLORIDE 105 105   CO2 22 23   GLUCOSE 102* 116*   BUN 8 9   CREATININE 0.66 0.73   CALCIUM 8.1* 9.1                   Imaging  US-CAROTID DOPPLER BILAT   Final Result      DX-CHEST-PORTABLE (1 VIEW)   Final Result         Lower lung volume with hypoventilatory change..      DX-CHEST-PORTABLE (1 VIEW)    Final Result      Interval extubation with similar moderate diffuse patchy opacity worrisome for atypical infection or aspiration      EC-ECHOCARDIOGRAM COMPLETE W/O CONT   Final Result      OUTSIDE IMAGES-CT CERVICAL SPINE   Final Result      OUTSIDE IMAGES-CT HEAD   Final Result      OUTSIDE IMAGES-DX CHEST   Final Result      DX-CHEST-PORTABLE (1 VIEW)   Final Result         Intubated.      Patchy bilateral lower lung opacities, atelectasis or infection.      Mildly prominent cardiopericardial silhouette.      CL-LEFT HEART CATHETERIZATION WITH POSSIBLE INTERVENTION    (Results Pending)        Assessment/Plan  * Cardiac arrest (HCC)  Assessment & Plan  Patient found with multivessel coronary disease  Continue telemonitoring  Cardiology consulting  Optimization of vascular compromise underway  6/24: EP recommending ICD placement after revascularization.  6/26: Planning for high risk PCI on Monday.  Planning for ICD placement after revascularization.        Ischemic cardiomyopathy- (present on admission)  Assessment & Plan  Management as above.    CVA (cerebral vascular accident) (HCC)  Assessment & Plan  With left-sided deficits, details unknown    Coronary artery disease involving native coronary artery of native heart  Assessment & Plan  Multivessel,  Cardiothoracic surgery feels the patient is too high risk for surgical intervention  Cardiology following for decision making, possible high risk PCI  6/24: Not candidate for surgical intervention per cardiovascular surgery.  Also not a candidate for high risk PCI.  Possibly planning to transfer to tertiary facility for revascularization versus CABG.   Cardiology in contact with Dr. Traylor at [place].  6/26: Since mental status improved, patient could be candidate for CABG.  However, he declined CABG.  Planning for high risk PCI Monday    Hypophosphatemia  Assessment & Plan  Replace and recheck    Leukemoid reaction  Assessment & Plan  Resolved,  follow    Acute encephalopathy  Assessment & Plan  Possibly secondary to prolonged downtime, hypoperfusion with anoxic injury  Mental status at baseline for now.    Hypomagnesemia  Assessment & Plan  Replace electrolytes and recheck    Alcohol intoxication (HCC)  Assessment & Plan  Patient presents with an elevated BAL unknown if he is a chronic drinker.    Metabolic acidosis  Assessment & Plan  Resolved, secondary to acute event, hypoperfusion    Hyperglycemia  Assessment & Plan  Low hemoglobin A1c, monitor, insulin per sliding scale    Hypernatremia  Assessment & Plan  Resolved.    WEN (acute kidney injury) ([facility])  Assessment & Plan  Resolved.  Avoid nephrotoxins.  Renal dose all medications.    Acute respiratory failure with hypoxia (HCC)  Assessment & Plan  Patient intubated for airway protection following out-of-hospital arrest  Extubated  Saturating well on room air.       VTE prophylaxis: Lovenox

## 2021-06-26 NOTE — CARE PLAN
The patient is Watcher - Medium risk of patient condition declining or worsening    Shift Goals  Clinical Goals: regain strenghth. Remain pain free  Patient Goals: get answers about risks  Family Goals: talk about heart procedures    Progress made toward(s) clinical / shift goals:        Problem: Knowledge Deficit - Standard  Goal: Patient and family/care givers will demonstrate understanding of plan of care, disease process/condition, diagnostic tests and medications  Outcome: Progressing     Problem: Physical Regulation  Goal: Diagnostic test results will improve  Outcome: Progressing     Problem: Skin Integrity  Goal: Skin integrity is maintained or improved  Outcome: Progressing     Problem: Pain - Standard  Goal: Alleviation of pain or a reduction in pain to the patient’s comfort goal  Outcome: Progressing     Problem: Fall Risk  Goal: Patient will remain free from falls  Outcome: Progressing       Patient is not progressing towards the following goals:    Patient still unsure about which procedure to do. Spoke with family bedside with Fay and answered any questions in regards to heart procedures. Will follow back up Monday.

## 2021-06-26 NOTE — PROGRESS NOTES
Assumed care of PT A&O 4. Pt resting in bed with no signs of labored breathing. Tele monitor in place, cardiac rhythm being monitored. Call light within reach, bed in lowest position, upper bed rails up. Pt was updated on plan of care for the night.

## 2021-06-27 LAB
ALBUMIN SERPL BCP-MCNC: 3.4 G/DL (ref 3.2–4.9)
ALBUMIN/GLOB SERPL: 1 G/DL
ALP SERPL-CCNC: 79 U/L (ref 30–99)
ALT SERPL-CCNC: 24 U/L (ref 2–50)
ANION GAP SERPL CALC-SCNC: 11 MMOL/L (ref 7–16)
AST SERPL-CCNC: 23 U/L (ref 12–45)
BASOPHILS # BLD AUTO: 0.6 % (ref 0–1.8)
BASOPHILS # BLD: 0.06 K/UL (ref 0–0.12)
BILIRUB SERPL-MCNC: 0.5 MG/DL (ref 0.1–1.5)
BUN SERPL-MCNC: 10 MG/DL (ref 8–22)
CALCIUM SERPL-MCNC: 9.1 MG/DL (ref 8.5–10.5)
CHLORIDE SERPL-SCNC: 102 MMOL/L (ref 96–112)
CO2 SERPL-SCNC: 24 MMOL/L (ref 20–33)
CREAT SERPL-MCNC: 0.64 MG/DL (ref 0.5–1.4)
EOSINOPHIL # BLD AUTO: 0.32 K/UL (ref 0–0.51)
EOSINOPHIL NFR BLD: 3 % (ref 0–6.9)
ERYTHROCYTE [DISTWIDTH] IN BLOOD BY AUTOMATED COUNT: 46.1 FL (ref 35.9–50)
GLOBULIN SER CALC-MCNC: 3.3 G/DL (ref 1.9–3.5)
GLUCOSE BLD-MCNC: 100 MG/DL (ref 65–99)
GLUCOSE SERPL-MCNC: 103 MG/DL (ref 65–99)
HCT VFR BLD AUTO: 41.2 % (ref 42–52)
HGB BLD-MCNC: 14 G/DL (ref 14–18)
IMM GRANULOCYTES # BLD AUTO: 0.05 K/UL (ref 0–0.11)
IMM GRANULOCYTES NFR BLD AUTO: 0.5 % (ref 0–0.9)
LYMPHOCYTES # BLD AUTO: 1.16 K/UL (ref 1–4.8)
LYMPHOCYTES NFR BLD: 10.8 % (ref 22–41)
MCH RBC QN AUTO: 32.2 PG (ref 27–33)
MCHC RBC AUTO-ENTMCNC: 34 G/DL (ref 33.7–35.3)
MCV RBC AUTO: 94.7 FL (ref 81.4–97.8)
MONOCYTES # BLD AUTO: 0.84 K/UL (ref 0–0.85)
MONOCYTES NFR BLD AUTO: 7.8 % (ref 0–13.4)
NEUTROPHILS # BLD AUTO: 8.28 K/UL (ref 1.82–7.42)
NEUTROPHILS NFR BLD: 77.3 % (ref 44–72)
NRBC # BLD AUTO: 0 K/UL
NRBC BLD-RTO: 0 /100 WBC
PLATELET # BLD AUTO: 317 K/UL (ref 164–446)
PMV BLD AUTO: 9.7 FL (ref 9–12.9)
POTASSIUM SERPL-SCNC: 3.4 MMOL/L (ref 3.6–5.5)
PROT SERPL-MCNC: 6.7 G/DL (ref 6–8.2)
RBC # BLD AUTO: 4.35 M/UL (ref 4.7–6.1)
SODIUM SERPL-SCNC: 137 MMOL/L (ref 135–145)
WBC # BLD AUTO: 10.7 K/UL (ref 4.8–10.8)

## 2021-06-27 PROCEDURE — 36415 COLL VENOUS BLD VENIPUNCTURE: CPT

## 2021-06-27 PROCEDURE — 700102 HCHG RX REV CODE 250 W/ 637 OVERRIDE(OP): Performed by: NURSE PRACTITIONER

## 2021-06-27 PROCEDURE — 99232 SBSQ HOSP IP/OBS MODERATE 35: CPT | Performed by: FAMILY MEDICINE

## 2021-06-27 PROCEDURE — 82962 GLUCOSE BLOOD TEST: CPT

## 2021-06-27 PROCEDURE — 80053 COMPREHEN METABOLIC PANEL: CPT

## 2021-06-27 PROCEDURE — 700102 HCHG RX REV CODE 250 W/ 637 OVERRIDE(OP): Performed by: HOSPITALIST

## 2021-06-27 PROCEDURE — A9270 NON-COVERED ITEM OR SERVICE: HCPCS | Performed by: NURSE PRACTITIONER

## 2021-06-27 PROCEDURE — A9270 NON-COVERED ITEM OR SERVICE: HCPCS | Performed by: FAMILY MEDICINE

## 2021-06-27 PROCEDURE — A9270 NON-COVERED ITEM OR SERVICE: HCPCS | Performed by: HOSPITALIST

## 2021-06-27 PROCEDURE — 700101 HCHG RX REV CODE 250: Performed by: INTERNAL MEDICINE

## 2021-06-27 PROCEDURE — 700102 HCHG RX REV CODE 250 W/ 637 OVERRIDE(OP): Performed by: FAMILY MEDICINE

## 2021-06-27 PROCEDURE — 770020 HCHG ROOM/CARE - TELE (206)

## 2021-06-27 PROCEDURE — 99232 SBSQ HOSP IP/OBS MODERATE 35: CPT | Performed by: STUDENT IN AN ORGANIZED HEALTH CARE EDUCATION/TRAINING PROGRAM

## 2021-06-27 PROCEDURE — 700111 HCHG RX REV CODE 636 W/ 250 OVERRIDE (IP): Performed by: FAMILY MEDICINE

## 2021-06-27 PROCEDURE — 85025 COMPLETE CBC W/AUTO DIFF WBC: CPT

## 2021-06-27 RX ORDER — POTASSIUM CHLORIDE 20 MEQ/1
20 TABLET, EXTENDED RELEASE ORAL ONCE
Status: COMPLETED | OUTPATIENT
Start: 2021-06-27 | End: 2021-06-27

## 2021-06-27 RX ADMIN — CLOPIDOGREL BISULFATE 75 MG: 75 TABLET ORAL at 05:43

## 2021-06-27 RX ADMIN — ASPIRIN 81 MG: 81 TABLET, COATED ORAL at 05:43

## 2021-06-27 RX ADMIN — ATORVASTATIN CALCIUM 80 MG: 80 TABLET, FILM COATED ORAL at 17:10

## 2021-06-27 RX ADMIN — LOSARTAN POTASSIUM 100 MG: 50 TABLET, FILM COATED ORAL at 05:45

## 2021-06-27 RX ADMIN — METOPROLOL SUCCINATE 75 MG: 50 TABLET, EXTENDED RELEASE ORAL at 05:42

## 2021-06-27 RX ADMIN — LIDOCAINE 1 PATCH: 50 PATCH TOPICAL at 17:10

## 2021-06-27 RX ADMIN — POTASSIUM CHLORIDE 20 MEQ: 1500 TABLET, EXTENDED RELEASE ORAL at 14:16

## 2021-06-27 RX ADMIN — SPIRONOLACTONE 25 MG: 25 TABLET ORAL at 05:43

## 2021-06-27 RX ADMIN — GUAIFENESIN 600 MG: 600 TABLET, EXTENDED RELEASE ORAL at 17:10

## 2021-06-27 RX ADMIN — GUAIFENESIN 600 MG: 600 TABLET, EXTENDED RELEASE ORAL at 05:42

## 2021-06-27 RX ADMIN — ENOXAPARIN SODIUM 40 MG: 40 INJECTION SUBCUTANEOUS at 16:00

## 2021-06-27 ASSESSMENT — LIFESTYLE VARIABLES: SUBSTANCE_ABUSE: 0

## 2021-06-27 ASSESSMENT — ENCOUNTER SYMPTOMS
BLURRED VISION: 0
SHORTNESS OF BREATH: 0
FEVER: 0
CHILLS: 0
DEPRESSION: 0
CHEST TIGHTNESS: 0
HEARTBURN: 0
APNEA: 0
MYALGIAS: 0
PALPITATIONS: 0
FOCAL WEAKNESS: 1
WEAKNESS: 1
NECK PAIN: 0
STRIDOR: 0
DOUBLE VISION: 0
CHOKING: 0
COUGH: 0
WHEEZING: 0

## 2021-06-27 ASSESSMENT — PAIN DESCRIPTION - PAIN TYPE: TYPE: ACUTE PAIN

## 2021-06-27 ASSESSMENT — FIBROSIS 4 INDEX: FIB4 SCORE: 0.92

## 2021-06-27 NOTE — CARE PLAN
The patient is Watcher - Medium risk of patient condition declining or worsening    Shift Goals  Clinical Goals: maintain SBP <130  Patient Goals: regain strength  Family Goals: answer questions    Progress made toward(s) clinical / shift goals:        Problem: Knowledge Deficit - Standard  Goal: Patient and family/care givers will demonstrate understanding of plan of care, disease process/condition, diagnostic tests and medications  Outcome: Progressing     Problem: Respiratory  Goal: Patient will achieve/maintain optimum respiratory ventilation and gas exchange  Outcome: Progressing     Problem: Physical Regulation  Goal: Diagnostic test results will improve  Outcome: Progressing     Problem: Skin Integrity  Goal: Skin integrity is maintained or improved  Outcome: Progressing     Problem: Fall Risk  Goal: Patient will remain free from falls  Outcome: Progressing     Problem: Infection - Standard  Goal: Patient will remain free from infection  Outcome: Progressing       Patient is not progressing towards the following goals:    N/A

## 2021-06-27 NOTE — CARE PLAN
Problem: Knowledge Deficit - Standard  Goal: Patient and family/care givers will demonstrate understanding of plan of care, disease process/condition, diagnostic tests and medications  Outcome: Progressing     Problem: Hemodynamics  Goal: Patient's hemodynamics, fluid balance and neurologic status will be stable or improve  Outcome: Progressing     Problem: Respiratory  Goal: Patient will achieve/maintain optimum respiratory ventilation and gas exchange  Outcome: Progressing     Problem: Fall Risk  Goal: Patient will remain free from falls  Outcome: Progressing

## 2021-06-27 NOTE — PROGRESS NOTES
Hospital Medicine Daily Progress Note    Date of Service  6/27/2021    Chief Complaint  60 y.o. male admitted 6/21/2021 with cardiac arrest and a local casino in the [place] area.  The patient was shocked by AED x1 and transported to a local emergency room with pulses present at the time.    Hospital Course  This is a 60-year-old male that was brought to an outlying facility after out of hospital arrest and after initial stabilization transferred to [facility] for further definitive intervention.  Reportedly the patient was found down with an unknown amount of downtime, there was bystander CPR and AED was attached with discharge x1.  EMS arrived and placed a airway and started transport.  The patient had additional several rounds of CPR in transit and then had return of neurologic function.  The patient was combative but was able to tell the paramedics his name.  He then was intubated in the emergency room at the [facility] and brought to [facility].  The patient was given heparin and aspirin initially.  The patient reportedly had a history of stroke with left-sided deficits, after initial presentation he improved but remains still with memory deficits.  The patient went for left heart catheterization that was found to have multivessel significant coronary disease.  Cardiothoracic surgery recommends to not proceed with open heart surgery but alternative treatment.  High risk PCI recommended.    Interval Problem Update  Patient seen and examined today.    Patient tolerating treatment and therapies.  All Data, Medication data reviewed.  Case discussed with nursing as available.  Plan of Care reviewed with patient and notified of changes.  6/23 the patient feels tired and is sore in his chest, he still has memory deficits, he has a hard time recalling normal orientation questions.  Alert and oriented x2-3, sinus rhythm in the 70s, blood pressure 110s over 120s, good urine output, 2  L per nasal cannula oxygen, low-grade temperature, 99.1, remains on heparin drip, aspirin, statin.  Per cardiology the patient is okay to go to telemetry.  Electrolytes being replaced,  6/24: Patient in bed comfortable.  Disoriented and confused occasionally.  Overall mentation slightly improved compared to prior as per staff.  Respiratory status stable hemodynamic stable.  Cardiology recommending transferring to tertiary center for high risk PCI versus CABG.  Transfer process has been initiated.  6/25: Resting in bed comfortable.  Awake and alert.  Oriented x4.  Hemodynamically stable.  Denies any chest pain this morning.  Ambulatory in the room.  Discussed the case with cardiology Dr. Carey who was in contact with Dr. Traylor at staff for for possible transfer.  6/26: Mental status improved and back to baseline.  Per cardiology, CABG can be considered at this point.  However, patient declined.  Planning for high risk PCI on Monday.  Transfer to [place] has been declined due to insurance issues.  Has been ambulatory in the room and down the collado.  No acute distress noted.  No issues overnight per staff.  6/27: Resting in bed comfortable.  Awake and alert.  Oriented x4.  Ambulatory in the room down the collado.  Saturating well on room air.  Hemodynamically stable.  Denies any chest pain.  Planning for high risk PCI tomorrow.  Keep n.p.o. at midnight.  Consultants/Specialty  Critical care  Cardiology  Code Status  Full Code    Disposition  Pending clinical course  Pending transferring to [place] if accepted.  Review of Systems  Review of Systems   Constitutional: Negative for chills and fever.   HENT: Negative for ear pain, hearing loss and tinnitus.    Eyes: Negative for blurred vision and double vision.   Respiratory: Negative for cough.    Cardiovascular: Negative for chest pain and palpitations.   Gastrointestinal: Negative for heartburn.   Genitourinary: Negative for dysuria and urgency.   Musculoskeletal:  Negative for myalgias and neck pain.   Skin: Negative for rash.   Neurological: Positive for focal weakness and weakness.   Psychiatric/Behavioral: Negative for depression, substance abuse and suicidal ideas.        Physical Exam  Temp:  [36.2 °C (97.2 °F)-36.7 °C (98.1 °F)] 36.5 °C (97.7 °F)  Pulse:  [69-82] 69  Resp:  [17-18] 18  BP: (112-171)/(57-99) 132/92  SpO2:  [92 %-95 %] 94 %    Physical Exam  Vitals and nursing note reviewed.   Constitutional:       General: He is not in acute distress.     Appearance: He is well-developed. He is obese.      Interventions: Nasal cannula in place.      Comments: Pt seen and examined.   HENT:      Head: Normocephalic and atraumatic.   Eyes:      General:         Right eye: No discharge.         Left eye: No discharge.      Pupils: Pupils are equal, round, and reactive to light.   Cardiovascular:      Rate and Rhythm: Normal rate.      Heart sounds: Normal heart sounds.   Pulmonary:      Effort: Pulmonary effort is normal.      Breath sounds: Rhonchi and rales present.   Abdominal:      General: Bowel sounds are normal. There is no distension.      Palpations: Abdomen is soft.   Musculoskeletal:         General: Normal range of motion.   Skin:     General: Skin is warm and dry.   Neurological:      Mental Status: He is alert and oriented to person, place, and time.      Motor: Weakness present.   Psychiatric:         Mood and Affect: Mood normal.         Behavior: Behavior normal.         Fluids    Intake/Output Summary (Last 24 hours) at 6/27/2021 1540  Last data filed at 6/27/2021 1400  Gross per 24 hour   Intake 480 ml   Output 1825 ml   Net -1345 ml       Laboratory  Recent Labs     06/27/21  0218   WBC 10.7   RBC 4.35*   HEMOGLOBIN 14.0   HEMATOCRIT 41.2*   MCV 94.7   MCH 32.2   MCHC 34.0   RDW 46.1   PLATELETCT 317   MPV 9.7     Recent Labs     06/26/21  1144 06/27/21  0218   SODIUM 138 137   POTASSIUM 4.0 3.4*   CHLORIDE 105 102   CO2 23 24   GLUCOSE 116* 103*   BUN 9  10   CREATININE 0.73 0.64   CALCIUM 9.1 9.1                   Imaging  US-CAROTID DOPPLER BILAT   Final Result      DX-CHEST-PORTABLE (1 VIEW)   Final Result         Lower lung volume with hypoventilatory change..      DX-CHEST-PORTABLE (1 VIEW)   Final Result      Interval extubation with similar moderate diffuse patchy opacity worrisome for atypical infection or aspiration      EC-ECHOCARDIOGRAM COMPLETE W/O CONT   Final Result      OUTSIDE IMAGES-CT CERVICAL SPINE   Final Result      OUTSIDE IMAGES-CT HEAD   Final Result      OUTSIDE IMAGES-DX CHEST   Final Result      DX-CHEST-PORTABLE (1 VIEW)   Final Result         Intubated.      Patchy bilateral lower lung opacities, atelectasis or infection.      Mildly prominent cardiopericardial silhouette.      CL-LEFT HEART CATHETERIZATION WITH POSSIBLE INTERVENTION    (Results Pending)        Assessment/Plan  * Cardiac arrest (HCC)  Assessment & Plan  Patient found with multivessel coronary disease  Continue telemonitoring  Cardiology consulting  Optimization of vascular compromise underway  6/24: EP recommending ICD placement after revascularization.  6/26: Planning for high risk PCI on Monday.  Planning for ICD placement after revascularization.  6/27: Plan for high risk PCI tomorrow.  This will be followed by ICD placement on different day.        Ischemic cardiomyopathy- (present on admission)  Assessment & Plan  Management as above.    CVA (cerebral vascular accident) (HCC)  Assessment & Plan  With left-sided deficits, details unknown    Coronary artery disease involving native coronary artery of native heart  Assessment & Plan  Multivessel,  Cardiothoracic surgery feels the patient is too high risk for surgical intervention  Cardiology following for decision making, possible high risk PCI  6/24: Not candidate for surgical intervention per cardiovascular surgery.  Also not a candidate for high risk PCI.  Possibly planning to transfer to tertiary facility for  revascularization versus CABG.   Cardiology in contact with Dr. Traylor at [place].  6/26: Since mental status improved, patient could be candidate for CABG.  However, he declined CABG.  Planning for high risk PCI Monday 6/27: High risk PCI tomorrow.    Hypophosphatemia  Assessment & Plan  Replace and recheck    Leukemoid reaction  Assessment & Plan  Resolved, follow    Acute encephalopathy  Assessment & Plan  Possibly secondary to prolonged downtime, hypoperfusion with anoxic injury  Mental status at baseline for now.    Hypomagnesemia  Assessment & Plan  Replace electrolytes and recheck    Alcohol intoxication (HCC)  Assessment & Plan  Patient presents with an elevated BAL unknown if he is a chronic drinker.    Metabolic acidosis  Assessment & Plan  Resolved, secondary to acute event, hypoperfusion    Hyperglycemia  Assessment & Plan  Low hemoglobin A1c, monitor, insulin per sliding scale    Hypernatremia  Assessment & Plan  Resolved.    WEN (acute kidney injury) (HCC)  Assessment & Plan  Resolved.  Avoid nephrotoxins.  Renal dose all medications.    Acute respiratory failure with hypoxia (HCC)  Assessment & Plan  Patient intubated for airway protection following out-of-hospital arrest  Extubated  Saturating well on room air.       VTE prophylaxis: Lovenox

## 2021-06-27 NOTE — DISCHARGE PLANNING
Anticipated Discharge Disposition: SNF    Action: This RNCM went to speak with patient at the bedside regarding SNF choice. Patient was not in his room.     Barriers to Discharge: SNF acceptance, medical clearance    Plan: HCM will round back tomorrow to obtain SNF choice.

## 2021-06-27 NOTE — PROGRESS NOTES
Cardiology Follow Up Progress Note    Date of Service  6/27/2021    Attending Physician  Vu Butler M.D.    Chief Complaint     Cardiac arrest     HPI  Yasmany Huerta is a 60 y.o. male with a PMH significant for CVA 7 years ago (left-sided hemiparesis) suffered a witnessed cardiac arrest at the [facility], underwent bystander CPR, AED was placed, shocked x1, in ER underwent additional rounds of ACLS with regaining ROSC.    Interim Events    No overnight cardiac events  Telemetry SR  No recurrent chest pain  /90  Labs reviewed  Low potassium, 3.4, replete  We will tentatively plan for high risk PCI tomorrow.  Keep patient n.p.o. after midnight    Review of Systems  Review of Systems   Respiratory: Negative for apnea, cough, choking, chest tightness, shortness of breath, wheezing and stridor.        Vital signs in last 24 hours  Temp:  [36.2 °C (97.2 °F)-36.7 °C (98.1 °F)] 36.5 °C (97.7 °F)  Pulse:  [69-82] 69  Resp:  [17-18] 18  BP: (112-171)/(57-99) 132/92  SpO2:  [92 %-95 %] 94 %    Physical Exam  Physical Exam  Cardiovascular:      Rate and Rhythm: Normal rate and regular rhythm.      Pulses: Normal pulses.   Pulmonary:      Effort: Pulmonary effort is normal.   Abdominal:      General: Abdomen is flat.   Skin:     General: Skin is warm.      Comments: Right radial cath site without evidence of hematoma   Neurological:      General: No focal deficit present.      Mental Status: He is alert.   Psychiatric:         Mood and Affect: Mood normal.         Lab Review  Lab Results   Component Value Date/Time    WBC 10.7 06/27/2021 02:18 AM    RBC 4.35 (L) 06/27/2021 02:18 AM    HEMOGLOBIN 14.0 06/27/2021 02:18 AM    HEMATOCRIT 41.2 (L) 06/27/2021 02:18 AM    MCV 94.7 06/27/2021 02:18 AM    MCH 32.2 06/27/2021 02:18 AM    MCHC 34.0 06/27/2021 02:18 AM    MPV 9.7 06/27/2021 02:18 AM      Lab Results   Component Value Date/Time    SODIUM 137 06/27/2021 02:18 AM    POTASSIUM 3.4 (L) 06/27/2021 02:18 AM     CHLORIDE 102 06/27/2021 02:18 AM    CO2 24 06/27/2021 02:18 AM    GLUCOSE 103 (H) 06/27/2021 02:18 AM    BUN 10 06/27/2021 02:18 AM    CREATININE 0.64 06/27/2021 02:18 AM      Lab Results   Component Value Date/Time    ASTSGOT 23 06/27/2021 02:18 AM    ALTSGPT 24 06/27/2021 02:18 AM     Lab Results   Component Value Date/Time    CHOLSTRLTOT 151 06/21/2021 08:15 AM    LDL 87 06/21/2021 08:15 AM    HDL 33 (A) 06/21/2021 08:15 AM    TRIGLYCERIDE 155 (H) 06/21/2021 08:15 AM    TROPONINT 569 (H) 06/22/2021 03:30 AM       No results for input(s): NTPROBNP in the last 72 hours.    Cardiac Imaging and Procedures Review  EKG: Small inferior Q waves    Echocardiogram: 6/21/2021  Moderately reduced left ventricular systolic function. Left ventricular   ejection fraction is visually estimated to be 35-40%. Hypokinesis of   the mid to apical inferior wall, mid to distal septum and apical wall   segments. Grade I diastolic dysfunction.  Normal right ventricular size and systolic function.  Normal pericardium without effusion.        Cardiac Catheterization:   6/22/2021  Multivessel CAD  High-grade ostial LAD stenosis, occluded mid LAD with distal left to left and right-to-left collaterals, high-grade ostial diagonal stenosis, high-grade proximal and mid circumflex stenosis, high-grade mid RCA and ostial posterior descending artery stenosis.    Imaging  Chest X-Ray: Lower lung volume with hypoventilatory change     Stress Test: Not applicable    Assessment/Plan    OOH Cardiac arrest, VT/VF  -Multivessel CAD by left heart cath 6/22/2021.  -CTS  reevaluated the patient for CABG since neurologically however patient declined CABG  and prefers to undergo high risk PCI, will discuss high risk PCI with our interventionist on Monday.  -Hold off transferring to tertiary center .  -Appreciate EP recommendation for ICD placement after revascularization, follow up in patient.  -Continue with optimal guided medical therapy  -Aspirin, Plavix,  Lipitor 80, u Toprol-XL 75, spironolactone 25, losartan 100.    Ischemic cardiomyopathy, LVEF 35 - 40%  -Continue with GDMT  -Toprol-XL, spironolactone, losartan  -Appears compensated.    Acute encephalopathy  -Neurological status improved significantly.    Alcohol intoxication on arrival by lab   -Unknown severity of alcohol use    WEN  -Resolved    History of CVA 7 years ago  -Left-sided deficit    Cardiology will follow along  Plan for potential high risk PCI tomorrow.  At length discussion with the brother blaise and sister-in-law Sharon, they are in agreement.  N.p.o. after midnight      Thank you for allowing me to participate in the care of this patient.      Please contact me with any questions.    YANELIS Lu.   Cardiologist, [facility]  [phone]

## 2021-06-28 ENCOUNTER — APPOINTMENT (OUTPATIENT)
Dept: CARDIOLOGY | Facility: MEDICAL CENTER | Age: 61
DRG: 224 | End: 2021-06-28
Attending: PHYSICIAN ASSISTANT
Payer: MEDICAID

## 2021-06-28 LAB
ACT BLD: 246 SEC (ref 74–137)
ACT BLD: 274 SEC (ref 74–137)
ACT BLD: 279 SEC (ref 74–137)
ACT BLD: 279 SEC (ref 74–137)
ACT BLD: 280 SEC (ref 74–137)
EKG IMPRESSION: NORMAL

## 2021-06-28 PROCEDURE — 92928 PRQ TCAT PLMT NTRAC ST 1 LES: CPT | Mod: 59,RI | Performed by: INTERNAL MEDICINE

## 2021-06-28 PROCEDURE — 700105 HCHG RX REV CODE 258: Performed by: INTERNAL MEDICINE

## 2021-06-28 PROCEDURE — 99232 SBSQ HOSP IP/OBS MODERATE 35: CPT | Performed by: FAMILY MEDICINE

## 2021-06-28 PROCEDURE — 700101 HCHG RX REV CODE 250

## 2021-06-28 PROCEDURE — 99153 MOD SED SAME PHYS/QHP EA: CPT

## 2021-06-28 PROCEDURE — 700102 HCHG RX REV CODE 250 W/ 637 OVERRIDE(OP): Performed by: NURSE PRACTITIONER

## 2021-06-28 PROCEDURE — 93005 ELECTROCARDIOGRAM TRACING: CPT | Performed by: INTERNAL MEDICINE

## 2021-06-28 PROCEDURE — A9270 NON-COVERED ITEM OR SERVICE: HCPCS

## 2021-06-28 PROCEDURE — 92929 PR PRQ TRLUML CORONARY STENT W/ANGIO ADDL ART/BRNCH: CPT | Mod: RC | Performed by: INTERNAL MEDICINE

## 2021-06-28 PROCEDURE — 700111 HCHG RX REV CODE 636 W/ 250 OVERRIDE (IP)

## 2021-06-28 PROCEDURE — A9270 NON-COVERED ITEM OR SERVICE: HCPCS | Performed by: HOSPITALIST

## 2021-06-28 PROCEDURE — 770020 HCHG ROOM/CARE - TELE (206)

## 2021-06-28 PROCEDURE — 700102 HCHG RX REV CODE 250 W/ 637 OVERRIDE(OP): Performed by: FAMILY MEDICINE

## 2021-06-28 PROCEDURE — 0273376 DILATION OF CORONARY ARTERY, FOUR OR MORE ARTERIES, BIFURCATION, WITH FOUR OR MORE DRUG-ELUTING INTRALUMINAL DEVICES, PERCUTANEOUS APPROACH: ICD-10-PCS | Performed by: INTERNAL MEDICINE

## 2021-06-28 PROCEDURE — 700102 HCHG RX REV CODE 250 W/ 637 OVERRIDE(OP): Performed by: HOSPITALIST

## 2021-06-28 PROCEDURE — 99152 MOD SED SAME PHYS/QHP 5/>YRS: CPT | Performed by: INTERNAL MEDICINE

## 2021-06-28 PROCEDURE — 93010 ELECTROCARDIOGRAM REPORT: CPT | Performed by: INTERNAL MEDICINE

## 2021-06-28 PROCEDURE — 85347 COAGULATION TIME ACTIVATED: CPT

## 2021-06-28 PROCEDURE — A9270 NON-COVERED ITEM OR SERVICE: HCPCS | Performed by: FAMILY MEDICINE

## 2021-06-28 PROCEDURE — A9270 NON-COVERED ITEM OR SERVICE: HCPCS | Performed by: NURSE PRACTITIONER

## 2021-06-28 PROCEDURE — 4A023N7 MEASUREMENT OF CARDIAC SAMPLING AND PRESSURE, LEFT HEART, PERCUTANEOUS APPROACH: ICD-10-PCS | Performed by: INTERNAL MEDICINE

## 2021-06-28 PROCEDURE — 700117 HCHG RX CONTRAST REV CODE 255: Performed by: INTERNAL MEDICINE

## 2021-06-28 PROCEDURE — 93458 L HRT ARTERY/VENTRICLE ANGIO: CPT | Mod: 26,59 | Performed by: INTERNAL MEDICINE

## 2021-06-28 PROCEDURE — 700101 HCHG RX REV CODE 250: Performed by: INTERNAL MEDICINE

## 2021-06-28 PROCEDURE — 700102 HCHG RX REV CODE 250 W/ 637 OVERRIDE(OP)

## 2021-06-28 PROCEDURE — B2111ZZ FLUOROSCOPY OF MULTIPLE CORONARY ARTERIES USING LOW OSMOLAR CONTRAST: ICD-10-PCS | Performed by: INTERNAL MEDICINE

## 2021-06-28 RX ORDER — HEPARIN SODIUM 1000 [USP'U]/ML
INJECTION, SOLUTION INTRAVENOUS; SUBCUTANEOUS
Status: COMPLETED
Start: 2021-06-28 | End: 2021-06-28

## 2021-06-28 RX ORDER — SODIUM CHLORIDE 9 MG/ML
INJECTION, SOLUTION INTRAVENOUS CONTINUOUS
Status: ACTIVE | OUTPATIENT
Start: 2021-06-28 | End: 2021-06-29

## 2021-06-28 RX ORDER — HEPARIN SODIUM 200 [USP'U]/100ML
INJECTION, SOLUTION INTRAVENOUS
Status: COMPLETED
Start: 2021-06-28 | End: 2021-06-28

## 2021-06-28 RX ORDER — LIDOCAINE HYDROCHLORIDE 20 MG/ML
INJECTION, SOLUTION INFILTRATION; PERINEURAL
Status: COMPLETED
Start: 2021-06-28 | End: 2021-06-28

## 2021-06-28 RX ORDER — MIDAZOLAM HYDROCHLORIDE 1 MG/ML
INJECTION INTRAMUSCULAR; INTRAVENOUS
Status: COMPLETED
Start: 2021-06-28 | End: 2021-06-28

## 2021-06-28 RX ORDER — CLOPIDOGREL BISULFATE 75 MG/1
300 TABLET ORAL ONCE
Status: DISCONTINUED | OUTPATIENT
Start: 2021-06-28 | End: 2021-06-28

## 2021-06-28 RX ORDER — VERAPAMIL HYDROCHLORIDE 2.5 MG/ML
INJECTION, SOLUTION INTRAVENOUS
Status: COMPLETED
Start: 2021-06-28 | End: 2021-06-28

## 2021-06-28 RX ORDER — CLOPIDOGREL 300 MG/1
TABLET, FILM COATED ORAL
Status: COMPLETED
Start: 2021-06-28 | End: 2021-06-28

## 2021-06-28 RX ADMIN — ATORVASTATIN CALCIUM 80 MG: 80 TABLET, FILM COATED ORAL at 16:51

## 2021-06-28 RX ADMIN — MIDAZOLAM HYDROCHLORIDE 2 MG: 1 INJECTION, SOLUTION INTRAMUSCULAR; INTRAVENOUS at 14:27

## 2021-06-28 RX ADMIN — HEPARIN SODIUM: 1000 INJECTION, SOLUTION INTRAVENOUS; SUBCUTANEOUS at 14:11

## 2021-06-28 RX ADMIN — NITROGLYCERIN 10 ML: 20 INJECTION INTRAVENOUS at 13:36

## 2021-06-28 RX ADMIN — VERAPAMIL HYDROCHLORIDE 2.5 MG: 2.5 INJECTION, SOLUTION INTRAVENOUS at 13:36

## 2021-06-28 RX ADMIN — LIDOCAINE HYDROCHLORIDE: 20 INJECTION, SOLUTION INFILTRATION; PERINEURAL at 13:36

## 2021-06-28 RX ADMIN — MIDAZOLAM HYDROCHLORIDE 2 MG: 1 INJECTION, SOLUTION INTRAMUSCULAR; INTRAVENOUS at 14:03

## 2021-06-28 RX ADMIN — CLOPIDOGREL BISULFATE 300 MG: 300 TABLET, FILM COATED ORAL at 14:54

## 2021-06-28 RX ADMIN — METOPROLOL SUCCINATE 75 MG: 50 TABLET, EXTENDED RELEASE ORAL at 06:03

## 2021-06-28 RX ADMIN — LIDOCAINE 1 PATCH: 50 PATCH TOPICAL at 16:51

## 2021-06-28 RX ADMIN — GUAIFENESIN 600 MG: 600 TABLET, EXTENDED RELEASE ORAL at 16:51

## 2021-06-28 RX ADMIN — HEPARIN SODIUM: 1000 INJECTION, SOLUTION INTRAVENOUS; SUBCUTANEOUS at 13:38

## 2021-06-28 RX ADMIN — IOHEXOL 155 ML: 350 INJECTION, SOLUTION INTRAVENOUS at 14:49

## 2021-06-28 RX ADMIN — GUAIFENESIN 600 MG: 600 TABLET, EXTENDED RELEASE ORAL at 06:03

## 2021-06-28 RX ADMIN — LOSARTAN POTASSIUM 100 MG: 50 TABLET, FILM COATED ORAL at 06:03

## 2021-06-28 RX ADMIN — ASPIRIN 81 MG: 81 TABLET, COATED ORAL at 06:03

## 2021-06-28 RX ADMIN — HEPARIN SODIUM 2000 UNITS: 200 INJECTION, SOLUTION INTRAVENOUS at 13:36

## 2021-06-28 RX ADMIN — FENTANYL CITRATE 50 MCG: 50 INJECTION, SOLUTION INTRAMUSCULAR; INTRAVENOUS at 14:49

## 2021-06-28 RX ADMIN — SODIUM CHLORIDE: 9 INJECTION, SOLUTION INTRAVENOUS at 17:45

## 2021-06-28 RX ADMIN — SPIRONOLACTONE 25 MG: 25 TABLET ORAL at 06:03

## 2021-06-28 RX ADMIN — FENTANYL CITRATE 100 MCG: 50 INJECTION, SOLUTION INTRAMUSCULAR; INTRAVENOUS at 14:06

## 2021-06-28 RX ADMIN — CLOPIDOGREL BISULFATE 75 MG: 75 TABLET ORAL at 06:03

## 2021-06-28 ASSESSMENT — ENCOUNTER SYMPTOMS
PALPITATIONS: 0
COUGH: 0
FOCAL WEAKNESS: 1
HEARTBURN: 0
FEVER: 0
DEPRESSION: 0
MYALGIAS: 0
BLURRED VISION: 0
WEAKNESS: 1

## 2021-06-28 ASSESSMENT — FIBROSIS 4 INDEX: FIB4 SCORE: 0.92

## 2021-06-28 ASSESSMENT — PAIN DESCRIPTION - PAIN TYPE: TYPE: ACUTE PAIN

## 2021-06-28 NOTE — THERAPY
06/28/21 0843   Interdisciplinary Plan of Care Collaboration   Collaboration Comments OT referral received. Per chart pt is pending PCI today. Will complete OT eval post-procedure.

## 2021-06-28 NOTE — PROGRESS NOTES
[facility] Cardiology Progress Note    Name:   Yasmany Huerta   YOB: 1960  Age:   60 y.o.  male   MRN:   [id number]    Consulting Cardiologist: Dr. Hi    Chief Complaint: cardiac arrest     Past Medical History:  59yo male with no prior cardiac history hospitalized for cardiac arrest (VF vs VT). Found to have multiple vessel CAD, ultimately CABG was recommended however patient declined and elected to go for high risk PCI.     History of Present Illness:  Plan for LHC today, target RCA and LCx. Discussed with Yasmany, his brother Kam and Dr. Hunter.       ROS  Constitutional: No fatigue.  Weight stable.  Respiratory:  no shortness of breath, no cough.  Cardiovascular:  No chest pain.  no lower extremity edema, no orthopnea or PND. Denies palpitations.  GI: No melena/bloody stools.  Neuro:  Denies dizziness, syncope.      All other review of systems reviewed and negative.    History reviewed. No pertinent surgical history.    History reviewed. No pertinent family history.    Social History     Socioeconomic History   • Marital status: Single     Spouse name: Not on file   • Number of children: Not on file   • Years of education: Not on file   • Highest education level: Not on file   Occupational History   • Not on file   Tobacco Use   • Smoking status: Never Smoker   • Smokeless tobacco: Never Used   Vaping Use   • Vaping Use: Never used   Substance and Sexual Activity   • Alcohol use: Yes   • Drug use: Never   • Sexual activity: Not on file   Other Topics Concern   • Not on file   Social History Narrative   • Not on file     Social Determinants of Health     Financial Resource Strain:    • Difficulty of Paying Living Expenses:    Food Insecurity:    • Worried About Running Out of Food in the Last Year:    • Ran Out of Food in the Last Year:    Transportation Needs:    • Lack of Transportation (Medical):    • Lack of Transportation (Non-Medical):    Physical Activity:    • Days of Exercise per  "Week:    • Minutes of Exercise per Session:    Stress:    • Feeling of Stress :    Social Connections:    • Frequency of Communication with Friends and Family:    • Frequency of Social Gatherings with Friends and Family:    • Attends Yazidism Services:    • Active Member of Clubs or Organizations:    • Attends Club or Organization Meetings:    • Marital Status:    Intimate Partner Violence:    • Fear of Current or Ex-Partner:    • Emotionally Abused:    • Physically Abused:    • Sexually Abused:        Medications / Drug list prior to admission:  No current facility-administered medications on file prior to encounter.     No current outpatient medications on file prior to encounter.       No Known Allergies    Physical Exam  Body mass index is 33.15 kg/m². /49   Pulse 68   Temp 37 °C (98.6 °F) (Temporal)   Resp 18   Ht 1.778 m (5' 10\")   Wt 105 kg (231 lb 0.7 oz)   SpO2 94%    Vitals:    06/27/21 2315 06/28/21 0344 06/28/21 0604 06/28/21 0729   BP: 113/61 122/75 127/74 103/49   Pulse: 69 71  68   Resp: 18 18  18   Temp: 36.9 °C (98.4 °F) 36.3 °C (97.4 °F)  37 °C (98.6 °F)   TempSrc: Temporal Temporal  Temporal   SpO2: 94% 93%  94%   Weight:       Height:        Oxygen Therapy:  Pulse Oximetry: 94 %, O2 (LPM): 0, O2 Delivery Device: None - Room Air    General: Well appearing, anxious appearing  Lungs: normal respiratory effort, no wheezing   Heart: no edema in bilateral lower extremities. 2+ bilateral radial pulses.    Abdomen: non distended  Extremities/MSK: Warm, well perfused, no clubbing, no cyanosis  Psychiatric: Appropriate affect, A/O x 3, intact judgement and insight  Skin: No rashes, warm extremities        Labs (personally reviewed):     Lab Results   Component Value Date/Time    SODIUM 137 06/27/2021 02:18 AM    POTASSIUM 3.4 (L) 06/27/2021 02:18 AM    CHLORIDE 102 06/27/2021 02:18 AM    CO2 24 06/27/2021 02:18 AM    GLUCOSE 103 (H) 06/27/2021 02:18 AM    BUN 10 06/27/2021 02:18 AM    " CREATININE 0.64 06/27/2021 02:18 AM     Lab Results   Component Value Date/Time    ALKPHOSPHAT 79 06/27/2021 02:18 AM    ASTSGOT 23 06/27/2021 02:18 AM    ALTSGPT 24 06/27/2021 02:18 AM    TBILIRUBIN 0.5 06/27/2021 02:18 AM      Lab Results   Component Value Date/Time    CHOLSTRLTOT 151 06/21/2021 08:15 AM    LDL 87 06/21/2021 08:15 AM    HDL 33 (A) 06/21/2021 08:15 AM    TRIGLYCERIDE 155 (H) 06/21/2021 08:15 AM     No results found for: BNPBTYPENAT        Assessment and Medical Decision Making:  Cardiac Arrest  Multi-vessel CAD  HFrEF, ischemic  - plan for [facility] today  - risks and benefits dicussed with patient, his MPOA and Dr. Hunter  - continue medical management for his HFrEF and CAD        Renetta Cantu PA-C  [facility]

## 2021-06-28 NOTE — CARE PLAN
Problem: Knowledge Deficit - Standard  Goal: Patient and family/care givers will demonstrate understanding of plan of care, disease process/condition, diagnostic tests and medications  Outcome: Progressing  Note: Pt educated about disease process. Reason why medications are taken, and informed about treatment plan. Pt educated about reason he is NPO and all questions were answered at this time.      Problem: Fall Risk  Goal: Patient will remain free from falls  Note: Pt mobility assessed at beginning of shift. Pt is standby assist with a walker. Fall precautions in place, non-slip socks on, bed in lowest locked position, bed alarm on, and call light within reach. Pt educated to call for assistance and verbalizes understanding.

## 2021-06-28 NOTE — CARE PLAN
The patient is Watcher - Medium risk of patient condition declining or worsening    Shift Goals  Clinical Goals: get any questiosn answered  Patient Goals: feel better  Family Goals: n/a    Progress made toward(s) clinical / shift goals:        Problem: Knowledge Deficit - Standard  Goal: Patient and family/care givers will demonstrate understanding of plan of care, disease process/condition, diagnostic tests and medications  Outcome: Progressing     Problem: Respiratory  Goal: Patient will achieve/maintain optimum respiratory ventilation and gas exchange  Outcome: Progressing     Problem: Physical Regulation  Goal: Diagnostic test results will improve  Outcome: Progressing     Problem: Skin Integrity  Goal: Skin integrity is maintained or improved  Outcome: Progressing     Problem: Pain - Standard  Goal: Alleviation of pain or a reduction in pain to the patient’s comfort goal  Outcome: Progressing     Problem: Fall Risk  Goal: Patient will remain free from falls  Outcome: Progressing       Patient is not progressing towards the following goals:    N/A

## 2021-06-28 NOTE — PROGRESS NOTES
EP note:  Pt is scheduled for revascularization attempt with PCI for his MVD with Dr. Hunter, likely this afternoon.   EP will follow up tomorrow post revascularization for timing of secondary prevention ICD.

## 2021-06-28 NOTE — PROCEDURES
Cardiac Catheterization and Percutaneous Intervention Procedure Report    6/28/2021    Referring MD:     Indication for procedure: Cardiac arrest s/p resucitation, NSTEMI, known multivessel PCI, surgical turndown, semielective multivessel percutaneous coronary intervention.    Procedures:  · Insertion of 5/6 FR sheath in the right radial artery  · right and left coronary arteriograms  · Left heart catheterization  · Successful PCI of RCA, left circumflex artery, ramus intermediate    Recommendations: Guideline directed medical therapy and risk factor management.  ICD placement       Coronary arteriograms:  Additional views are obtained to better evaluate lesions.  Left main normal  Left anterior descending artery ostially occluded, fills through collaterals from right coronary artery, very small apical LAD is seen through collaterals.  Left circumflex artery has diffuse 80% stenosis in the proximal portion, 99% stenosis in proximal second marginal branch.  Ramus intermedius has 70% stenosis in the proximal portion, 70% stenosis in the ostial portion.  RCA is very large dominant vessel, 80% stenosis in distal RCA, large posterolateral branch has 80% stenosis.      Left Heart Catheterization:  Left Ventriculogram: Not performed  Left Ventricular EDP: 15 mm Hg   Aortic Valve Gradient: No significant AV gradient noted    Procedure details:  Yasmany Huerta was brought to the cardiac catheterization lab where the right wrist was prepped and draped in the usual manner for cardiac catheterization.  The area was anesthetized with lidocaine and a 5/6 FR sheath was inserted into the right radial artery without difficulty. A EBU 3.5 catheter was advanced to the ostia of the Left coronary artery and arteriograms were recorded.   A AR2 catheter was advanced to the ostia of the right coronary artery and arteriograms were recorded. Aortic valve was crossed using AR2 catheter left heart catheterization was performed.   "Patient underwent percutaneous coronary revascularization as outlined below.  At the completion of the case the sheath was removed and hemostasis achieved utilizing a radial compression band .  Patient was pain-free and hemodynamically stable at the completion of the case.  There were no apparent complications.    Interventional Procedure RCA:     Proximal lesion  Pre: 80 %, 20 mm length, KASSIDY 3 flow  Post: 0%, KASSIDY 3 flow    Lesion complexity  High  Severe calcification no  Bifurcation  No    Distal lesion 80% stenosis, 12 mm in length KASSIDY-3 flow prior, after, lesion complexity high, no severe calcification      Guide catheter: AR2 was advanced to the ostia of the right coronary artery.    Guide wire: A 0.014\" mm  Runthrough was advanced into the artery and crossed the lesion.    Balloon pre-dilatation: 2.5 by 12 mm NC Emerge inflated to 10 STEFFANIE to pre-dilate the lesion.    Stent: A 3.5 by 26 mm Irving drug-eluting  stent was deployed in distal  right coronary artery at 14 STEFFANIE.      Additional stent: A 3.0 by 18 mm Irving drug-eluting  stent was deployed in mid posterolateral branch of right coronary artery at 12 STEFFANIE.  The stent was postdilated using 3.25 x 15 mm NC balloon.    PCI LCX and Ramus:    Left circumflex artery  Pre: 80 %, 46 mm length, KASSIDY 3 flow  Post: 0%, KASSIDY 3 flow    Lesion complexity  High  Severe calcification No  Bifurcation  Yes    Ramus  Pre: 70 %, 20 mm length, KASSIDY 3 flow  Post: 0%, KASSIDY 3 flow    Lesion complexity  High  Severe calcification No  Bifurcation  yes    Guide catheter: EBU 3.5 was advanced to the ostia of the left coronary artery.    Guide wire: A 0.014\" mm  Runthrough was advanced into the artery and crossed the lesion.    Balloon pre-dilatation: 2.5 by 15 mm Emerge inflated to 8 STEFFANIE to pre-dilate the lesion.    Stent: A 2.75 by 22 mm Cory drug-eluting  stent was deployed in proximal  obtuse marginal coronary artery at 12 STEFFANIE.  A 3.0X26 mm Cory drug eluting stent placed in " proximal left circumflex artery overlapping with distal stent.  Ramus intermedius treated with 2.5X22 [place] drug eluting stent in proximal portion.  Kissing balloon was performed at left main.  At the end excellent flow into marginal and ramus branches.      Anticoagulant: Heparin  Antiplatelet: clopidogrel   EBL <25 cc  Complications: none  Specimens: none  Contrast: 165cc  Fluorotime : see cath lab flowsheet      Sedation: I supervised moderate sedation over a trained independent observer.    Sedation start time: 13:27  End time: 14:47      Electronically signed by   Ethan Hunter M.D., B[place]AC[place]  Interventional cardiologist  6/28/2021  2:59 PM

## 2021-06-29 LAB
ANION GAP SERPL CALC-SCNC: 13 MMOL/L (ref 7–16)
APTT PPP: 27.1 SEC (ref 24.7–36)
BUN SERPL-MCNC: 9 MG/DL (ref 8–22)
CALCIUM SERPL-MCNC: 8.9 MG/DL (ref 8.5–10.5)
CHLORIDE SERPL-SCNC: 104 MMOL/L (ref 96–112)
CO2 SERPL-SCNC: 22 MMOL/L (ref 20–33)
CREAT SERPL-MCNC: 0.65 MG/DL (ref 0.5–1.4)
ERYTHROCYTE [DISTWIDTH] IN BLOOD BY AUTOMATED COUNT: 48 FL (ref 35.9–50)
GLUCOSE BLD-MCNC: 118 MG/DL (ref 65–99)
GLUCOSE BLD-MCNC: 96 MG/DL (ref 65–99)
GLUCOSE SERPL-MCNC: 103 MG/DL (ref 65–99)
HCT VFR BLD AUTO: 41.9 % (ref 42–52)
HGB BLD-MCNC: 13.8 G/DL (ref 14–18)
MCH RBC QN AUTO: 31.9 PG (ref 27–33)
MCHC RBC AUTO-ENTMCNC: 32.9 G/DL (ref 33.7–35.3)
MCV RBC AUTO: 96.8 FL (ref 81.4–97.8)
PLATELET # BLD AUTO: 331 K/UL (ref 164–446)
PMV BLD AUTO: 9.2 FL (ref 9–12.9)
POTASSIUM SERPL-SCNC: 3.6 MMOL/L (ref 3.6–5.5)
RBC # BLD AUTO: 4.33 M/UL (ref 4.7–6.1)
SODIUM SERPL-SCNC: 139 MMOL/L (ref 135–145)
WBC # BLD AUTO: 11.3 K/UL (ref 4.8–10.8)

## 2021-06-29 PROCEDURE — 85730 THROMBOPLASTIN TIME PARTIAL: CPT

## 2021-06-29 PROCEDURE — 80048 BASIC METABOLIC PNL TOTAL CA: CPT

## 2021-06-29 PROCEDURE — 700102 HCHG RX REV CODE 250 W/ 637 OVERRIDE(OP): Performed by: NURSE PRACTITIONER

## 2021-06-29 PROCEDURE — 97530 THERAPEUTIC ACTIVITIES: CPT

## 2021-06-29 PROCEDURE — 770020 HCHG ROOM/CARE - TELE (206)

## 2021-06-29 PROCEDURE — A9270 NON-COVERED ITEM OR SERVICE: HCPCS | Performed by: NURSE PRACTITIONER

## 2021-06-29 PROCEDURE — 99232 SBSQ HOSP IP/OBS MODERATE 35: CPT | Performed by: INTERNAL MEDICINE

## 2021-06-29 PROCEDURE — 700102 HCHG RX REV CODE 250 W/ 637 OVERRIDE(OP): Performed by: FAMILY MEDICINE

## 2021-06-29 PROCEDURE — 700102 HCHG RX REV CODE 250 W/ 637 OVERRIDE(OP): Performed by: STUDENT IN AN ORGANIZED HEALTH CARE EDUCATION/TRAINING PROGRAM

## 2021-06-29 PROCEDURE — 700102 HCHG RX REV CODE 250 W/ 637 OVERRIDE(OP): Performed by: PHYSICIAN ASSISTANT

## 2021-06-29 PROCEDURE — 36415 COLL VENOUS BLD VENIPUNCTURE: CPT

## 2021-06-29 PROCEDURE — A9270 NON-COVERED ITEM OR SERVICE: HCPCS | Performed by: FAMILY MEDICINE

## 2021-06-29 PROCEDURE — 97165 OT EVAL LOW COMPLEX 30 MIN: CPT

## 2021-06-29 PROCEDURE — A9270 NON-COVERED ITEM OR SERVICE: HCPCS | Performed by: PHYSICIAN ASSISTANT

## 2021-06-29 PROCEDURE — 82962 GLUCOSE BLOOD TEST: CPT | Mod: 91

## 2021-06-29 PROCEDURE — 85027 COMPLETE CBC AUTOMATED: CPT

## 2021-06-29 PROCEDURE — 700111 HCHG RX REV CODE 636 W/ 250 OVERRIDE (IP): Performed by: FAMILY MEDICINE

## 2021-06-29 PROCEDURE — 700102 HCHG RX REV CODE 250 W/ 637 OVERRIDE(OP): Performed by: HOSPITALIST

## 2021-06-29 PROCEDURE — 700111 HCHG RX REV CODE 636 W/ 250 OVERRIDE (IP): Performed by: PHYSICIAN ASSISTANT

## 2021-06-29 PROCEDURE — A9270 NON-COVERED ITEM OR SERVICE: HCPCS | Performed by: HOSPITALIST

## 2021-06-29 PROCEDURE — A9270 NON-COVERED ITEM OR SERVICE: HCPCS | Performed by: STUDENT IN AN ORGANIZED HEALTH CARE EDUCATION/TRAINING PROGRAM

## 2021-06-29 RX ORDER — POTASSIUM CHLORIDE 20 MEQ/1
40 TABLET, EXTENDED RELEASE ORAL ONCE
Status: COMPLETED | OUTPATIENT
Start: 2021-06-29 | End: 2021-06-29

## 2021-06-29 RX ORDER — METOPROLOL SUCCINATE 50 MG/1
100 TABLET, EXTENDED RELEASE ORAL
Status: DISCONTINUED | OUTPATIENT
Start: 2021-06-29 | End: 2021-07-01 | Stop reason: HOSPADM

## 2021-06-29 RX ORDER — LOSARTAN POTASSIUM 50 MG/1
50 TABLET ORAL
Status: DISCONTINUED | OUTPATIENT
Start: 2021-06-30 | End: 2021-07-01 | Stop reason: HOSPADM

## 2021-06-29 RX ORDER — FUROSEMIDE 10 MG/ML
20 INJECTION INTRAMUSCULAR; INTRAVENOUS ONCE
Status: COMPLETED | OUTPATIENT
Start: 2021-06-29 | End: 2021-06-29

## 2021-06-29 RX ADMIN — GUAIFENESIN 600 MG: 600 TABLET, EXTENDED RELEASE ORAL at 17:29

## 2021-06-29 RX ADMIN — METOPROLOL SUCCINATE 100 MG: 50 TABLET, EXTENDED RELEASE ORAL at 12:05

## 2021-06-29 RX ADMIN — CLOPIDOGREL BISULFATE 75 MG: 75 TABLET ORAL at 06:14

## 2021-06-29 RX ADMIN — POTASSIUM CHLORIDE 40 MEQ: 1500 TABLET, EXTENDED RELEASE ORAL at 12:05

## 2021-06-29 RX ADMIN — GUAIFENESIN 600 MG: 600 TABLET, EXTENDED RELEASE ORAL at 06:07

## 2021-06-29 RX ADMIN — ENOXAPARIN SODIUM 40 MG: 40 INJECTION SUBCUTANEOUS at 06:06

## 2021-06-29 RX ADMIN — ASPIRIN 81 MG: 81 TABLET, COATED ORAL at 06:07

## 2021-06-29 RX ADMIN — FUROSEMIDE 20 MG: 10 INJECTION, SOLUTION INTRAMUSCULAR; INTRAVENOUS at 12:06

## 2021-06-29 RX ADMIN — POTASSIUM CHLORIDE 40 MEQ: 1500 TABLET, EXTENDED RELEASE ORAL at 06:07

## 2021-06-29 RX ADMIN — SPIRONOLACTONE 25 MG: 25 TABLET ORAL at 06:07

## 2021-06-29 RX ADMIN — PSYLLIUM HUSK 1 PACKET: 3.4 POWDER ORAL at 06:17

## 2021-06-29 RX ADMIN — ATORVASTATIN CALCIUM 80 MG: 80 TABLET, FILM COATED ORAL at 17:29

## 2021-06-29 ASSESSMENT — ENCOUNTER SYMPTOMS
BLURRED VISION: 0
NAUSEA: 0
NUMBNESS: 0
CHILLS: 0
FEVER: 0
LIGHT-HEADEDNESS: 0
COUGH: 1
WEAKNESS: 1
COUGH: 0
SHORTNESS OF BREATH: 0
HEADACHES: 0
FOCAL WEAKNESS: 1
MYALGIAS: 0
VOMITING: 0
CHEST TIGHTNESS: 0
HEARTBURN: 0
DEPRESSION: 0
SPEECH DIFFICULTY: 0
PALPITATIONS: 0
FATIGUE: 0
WHEEZING: 0
DIZZINESS: 0
TROUBLE SWALLOWING: 0

## 2021-06-29 ASSESSMENT — COGNITIVE AND FUNCTIONAL STATUS - GENERAL
HELP NEEDED FOR BATHING: A LITTLE
DAILY ACTIVITIY SCORE: 23
SUGGESTED CMS G CODE MODIFIER MOBILITY: CJ
CLIMB 3 TO 5 STEPS WITH RAILING: A LITTLE
SUGGESTED CMS G CODE MODIFIER DAILY ACTIVITY: CI
MOVING TO AND FROM BED TO CHAIR: A LITTLE
MOVING FROM LYING ON BACK TO SITTING ON SIDE OF FLAT BED: A LITTLE
MOBILITY SCORE: 21

## 2021-06-29 ASSESSMENT — GAIT ASSESSMENTS
DISTANCE (FEET): 25
GAIT LEVEL OF ASSIST: SUPERVISED
DISTANCE (FEET): 150
ASSISTIVE DEVICE: QUAD CANE

## 2021-06-29 ASSESSMENT — ACTIVITIES OF DAILY LIVING (ADL): TOILETING: INDEPENDENT

## 2021-06-29 NOTE — PROGRESS NOTES
[facility] Cardiology Progress Note    Name:   Yasmany Huerta   YOB: 1960  Age:   60 y.o.  male   MRN:   [id number]    Consulting Cardiologist: Dr. Hi    Chief Complaint: cardiac arrest     Past Medical History:  61yo male with no prior cardiac history hospitalized for cardiac arrest (VF vs VT). Found to have multiple vessel CAD, ultimately CABG was recommended however patient declined and elected to go for high risk PCI. This was performed 6/28/21 s/p RCA, LCx and Ramus stenting.     History of Present Illness:  No overnight events. He is sitting up in bed overnight because he has a productive cough when he is supine. Otherwise denies dyspnea, lightheadedness, chest pains. He has some chest wall pain with inhalation, laughing, coughing.  Groin sheath out this morning. No bleeding or pain on wrist or groin.   BPs are well controlled, some asymptomatic hypotension.    ROS     Constitutional:  + fatigue (poor sleep).  Weight loss this hospital stay.  Respiratory:  Denies shortness of breath, + productive cough.  Cardiovascular:  +chest wall pain/pleuritic pain.  No lower extremity edema,  No orthopnea, + PND. Denies palpitations.  : No polyuria, no dysuria. No hematuria.  GI:  Denies nausea/vomiting. No abdominal distention or early satiety. No melena/blood stools.  Neuro:  Denies dizziness, syncope.  Hem/lymph: Denies easy bleeding/bruising.    MS: no arthralgias or myalgias.  Skin: Denies rash, open wound    All other review of systems reviewed and negative.      History reviewed. No pertinent surgical history.    History reviewed. No pertinent family history.    Social History     Socioeconomic History   • Marital status: Single     Spouse name: Not on file   • Number of children: Not on file   • Years of education: Not on file   • Highest education level: Not on file   Occupational History   • Not on file   Tobacco Use   • Smoking status: Never Smoker   • Smokeless tobacco: Never Used  "  Vaping Use   • Vaping Use: Never used   Substance and Sexual Activity   • Alcohol use: Yes   • Drug use: Never   • Sexual activity: Not on file   Other Topics Concern   • Not on file   Social History Narrative   • Not on file     Social Determinants of Health       Medications / Drug list prior to admission:  No current facility-administered medications on file prior to encounter.     No current outpatient medications on file prior to encounter.       No Known Allergies    Physical Exam  Body mass index is 33.12 kg/m². /62   Pulse 67   Temp 36.5 °C (97.7 °F) (Temporal)   Resp 17   Ht 1.778 m (5' 10\")   Wt 105 kg (230 lb 13.2 oz)   SpO2 98%    Vitals:    06/29/21 0549 06/29/21 0602 06/29/21 0617 06/29/21 0632   BP: 104/60 105/61 105/62 107/62   Pulse: 67 67 70 67   Resp: 17 16 17 17   Temp:  36.4 °C (97.6 °F)  36.5 °C (97.7 °F)   TempSrc:  Temporal  Temporal   SpO2: 94% 95% 96% 98%   Weight:       Height:        Oxygen Therapy:  Pulse Oximetry: 98 %, O2 (LPM): 0, O2 Delivery Device: None - Room Air    General: Well appearing, in no distress  Eyes: nl conjunctiva, no scleral icterus  Neck: ~8 cm JVP  Lungs: normal respiratory effort, No rales, no wheezing or rhonchi.  Heart: RRR, no murmurs, no rubs or gallops, no edema in bilateral lower extremities. . Right wrist is soft, mild bruising, radial pulse is 2+. Right groin is soft without bleeding or hematoma, right leg is warm, 2+ bilateral dp pulses.  Abdomen: soft, non tender, non distended  Extremities/MSK: Warm, well perfused, no clubbing, no cyanosis  Neurological: No focal sensory deficits, normal gait  Psychiatric: Appropriate affect, A/O x 3, intact judgement and insight  Skin: No rashes, warm extremities      Labs (personally reviewed):     Lab Results   Component Value Date/Time    SODIUM 139 06/29/2021 01:53 AM    POTASSIUM 3.6 06/29/2021 01:53 AM    CHLORIDE 104 06/29/2021 01:53 AM    CO2 22 06/29/2021 01:53 AM    GLUCOSE 103 (H) 06/29/2021 " 01:53 AM    BUN 9 06/29/2021 01:53 AM    CREATININE 0.65 06/29/2021 01:53 AM     Lab Results   Component Value Date/Time    ALKPHOSPHAT 79 06/27/2021 02:18 AM    ASTSGOT 23 06/27/2021 02:18 AM    ALTSGPT 24 06/27/2021 02:18 AM    TBILIRUBIN 0.5 06/27/2021 02:18 AM      Lab Results   Component Value Date/Time    CHOLSTRLTOT 151 06/21/2021 08:15 AM    LDL 87 06/21/2021 08:15 AM    HDL 33 (A) 06/21/2021 08:15 AM    TRIGLYCERIDE 155 (H) 06/21/2021 08:15 AM     No results found for: BNPBTYPENAT    Telemetry Review:  No sustained arrhythmias overnight, sinus rhythm with ectopy    Assessment and Medical Decision Making:  Cardiac Arrest  - plan for secondary prevention ICD this admission vs 40days post MI if EF still low. See EP's recommendations    Multi-vessel CAD  - S/P IVAN to RCA, LCx and Ramus. LAD occluded ostially with collaterals from RCA  - aspirin and plavix daily  - Lipitor 80mg  - Metoprolol     Acute HFrEF Stage B-C   Ischemic Cardiomyopathy  - Metoprolol SR to 100  - Losartan to 50 for lower BPs  - Spironolactone 25mg  - weight 239 on admission, 230 6/28. LVEDP was high on cath. His productive cough may represent some PND vs atelectasis, try lasix 20mg IV today with potassium       Renetta Cantu PA-C  [facility]

## 2021-06-29 NOTE — PROGRESS NOTES
Hospital Medicine Daily Progress Note    Date of Service  6/28/2021    Chief Complaint  60 y.o. male admitted 6/21/2021 with cardiac arrest and a local casino in the [place] area.  The patient was shocked by AED x1 and transported to a local emergency room with pulses present at the time.    Hospital Course  This is a 60-year-old male that was brought to an outlying facility after out of hospital arrest and after initial stabilization transferred to [facility] for further definitive intervention.  Reportedly the patient was found down with an unknown amount of downtime, there was bystander CPR and AED was attached with discharge x1.  EMS arrived and placed a airway and started transport.  The patient had additional several rounds of CPR in transit and then had return of neurologic function.  The patient was combative but was able to tell the paramedics his name.  He then was intubated in the emergency room at the [facility] and brought to [facility].  The patient was given heparin and aspirin initially.  The patient reportedly had a history of stroke with left-sided deficits, after initial presentation he improved but remains still with memory deficits.  The patient went for left heart catheterization that was found to have multivessel significant coronary disease.  Cardiothoracic surgery recommends to not proceed with open heart surgery but alternative treatment.  High risk PCI recommended.    Interval Problem Update  Patient seen and examined today.    Patient tolerating treatment and therapies.  All Data, Medication data reviewed.  Case discussed with nursing as available.  Plan of Care reviewed with patient and notified of changes.  6/23 the patient feels tired and is sore in his chest, he still has memory deficits, he has a hard time recalling normal orientation questions.  Alert and oriented x2-3, sinus rhythm in the 70s, blood pressure 110s over 120s, good urine output, 2  L per nasal cannula oxygen, low-grade temperature, 99.1, remains on heparin drip, aspirin, statin.  Per cardiology the patient is okay to go to telemetry.  Electrolytes being replaced,  6/24: Patient in bed comfortable.  Disoriented and confused occasionally.  Overall mentation slightly improved compared to prior as per staff.  Respiratory status stable hemodynamic stable.  Cardiology recommending transferring to tertiary center for high risk PCI versus CABG.  Transfer process has been initiated.  6/25: Resting in bed comfortable.  Awake and alert.  Oriented x4.  Hemodynamically stable.  Denies any chest pain this morning.  Ambulatory in the room.  Discussed the case with cardiology Dr. Carey who was in contact with Dr. Traylor at staff for for possible transfer.  6/26: Mental status improved and back to baseline.  Per cardiology, CABG can be considered at this point.  However, patient declined.  Planning for high risk PCI on Monday.  Transfer to [place] has been declined due to insurance issues.  Has been ambulatory in the room and down the collado.  No acute distress noted.  No issues overnight per staff.  6/27: Resting in bed comfortable.  Awake and alert.  Oriented x4.  Ambulatory in the room down the collado.  Saturating well on room air.  Hemodynamically stable.  Denies any chest pain.  Planning for high risk PCI tomorrow.  Keep n.p.o. at midnight.  6/28: Resting in bed comfortably.  Has been n.p.o. since midnight for left heart cath with intervention planned for the afternoon.  Hemodynamically and clinically stable.  No acute distress noted.  No issues overnight per staff.  Consultants/Specialty  Critical care  Cardiology  Code Status  Full Code    Disposition  Pending clinical course  Pending transferring to [place] if accepted.  Review of Systems  Review of Systems   Constitutional: Negative for fever.   HENT: Negative for hearing loss.    Eyes: Negative for blurred vision.   Respiratory: Negative for cough.     Cardiovascular: Negative for chest pain and palpitations.   Gastrointestinal: Negative for heartburn.   Genitourinary: Negative for dysuria and urgency.   Musculoskeletal: Negative for myalgias.   Skin: Negative for rash.   Neurological: Positive for focal weakness and weakness.   Psychiatric/Behavioral: Negative for depression.        Physical Exam  Temp:  [35.8 °C (96.5 °F)-37.1 °C (98.7 °F)] 36.3 °C (97.3 °F)  Pulse:  [65-71] 66  Resp:  [18] 18  BP: (103-145)/(49-79) 129/73  SpO2:  [93 %-100 %] 100 %    Physical Exam  Vitals and nursing note reviewed.   Constitutional:       General: He is not in acute distress.     Appearance: He is well-developed. He is obese. He is not ill-appearing.      Interventions: Nasal cannula in place.      Comments: Pt seen and examined.   HENT:      Head: Normocephalic and atraumatic.   Eyes:      General:         Right eye: No discharge.         Left eye: No discharge.      Pupils: Pupils are equal, round, and reactive to light.   Cardiovascular:      Rate and Rhythm: Normal rate.      Heart sounds: Normal heart sounds.   Pulmonary:      Effort: Pulmonary effort is normal.      Breath sounds: Rhonchi and rales present.   Abdominal:      General: Abdomen is flat. There is no distension.      Palpations: Abdomen is soft.   Skin:     General: Skin is warm and dry.   Neurological:      Mental Status: He is alert and oriented to person, place, and time.      Motor: Weakness present.   Psychiatric:         Mood and Affect: Mood normal.         Behavior: Behavior normal.         Fluids    Intake/Output Summary (Last 24 hours) at 6/28/2021 1817  Last data filed at 6/28/2021 1218  Gross per 24 hour   Intake --   Output 1100 ml   Net -1100 ml       Laboratory  Recent Labs     06/27/21  0218   WBC 10.7   RBC 4.35*   HEMOGLOBIN 14.0   HEMATOCRIT 41.2*   MCV 94.7   MCH 32.2   MCHC 34.0   RDW 46.1   PLATELETCT 317   MPV 9.7     Recent Labs     06/26/21  1144 06/27/21  0218   SODIUM 138 137    POTASSIUM 4.0 3.4*   CHLORIDE 105 102   CO2 23 24   GLUCOSE 116* 103*   BUN 9 10   CREATININE 0.73 0.64   CALCIUM 9.1 9.1                   Imaging  US-CAROTID DOPPLER BILAT   Final Result      DX-CHEST-PORTABLE (1 VIEW)   Final Result         Lower lung volume with hypoventilatory change..      DX-CHEST-PORTABLE (1 VIEW)   Final Result      Interval extubation with similar moderate diffuse patchy opacity worrisome for atypical infection or aspiration      EC-ECHOCARDIOGRAM COMPLETE W/O CONT   Final Result      OUTSIDE IMAGES-CT CERVICAL SPINE   Final Result      OUTSIDE IMAGES-CT HEAD   Final Result      OUTSIDE IMAGES-DX CHEST   Final Result      DX-CHEST-PORTABLE (1 VIEW)   Final Result         Intubated.      Patchy bilateral lower lung opacities, atelectasis or infection.      Mildly prominent cardiopericardial silhouette.      CL-LEFT HEART CATHETERIZATION WITH POSSIBLE INTERVENTION    (Results Pending)   CL-LEFT HEART CATHETERIZATION WITH POSSIBLE INTERVENTION    (Results Pending)        Assessment/Plan  * Cardiac arrest (HCC)  Assessment & Plan  Patient found with multivessel coronary disease  Continue telemonitoring  Cardiology consulting  Optimization of vascular compromise underway  6/24: EP recommending ICD placement after revascularization.  6/26: Planning for high risk PCI on Monday.  Planning for ICD placement after revascularization.  6/27: Plan for high risk PCI tomorrow.  This will be followed by ICD placement on different day.        Ischemic cardiomyopathy- (present on admission)  Assessment & Plan  Management as above.    CVA (cerebral vascular accident) (HCC)  Assessment & Plan  With left-sided deficits, details unknown    Coronary artery disease involving native coronary artery of native heart  Assessment & Plan  Multivessel,  Cardiothoracic surgery feels the patient is too high risk for surgical intervention  Cardiology following for decision making, possible high risk PCI  6/24: Not  candidate for surgical intervention per cardiovascular surgery.  Also not a candidate for high risk PCI.  Possibly planning to transfer to tertiary facility for revascularization versus CABG.   Cardiology in contact with Dr. Traylor at [place].  6/26: Since mental status improved, patient could be candidate for CABG.  However, he declined CABG.  Planning for high risk PCI Monday 6/27: High risk PCI tomorrow.  6/28: Left heart cath planned for today.    Hypophosphatemia  Assessment & Plan  Replace and recheck    Leukemoid reaction  Assessment & Plan  Resolved, follow    Acute encephalopathy  Assessment & Plan  Possibly secondary to prolonged downtime, hypoperfusion with anoxic injury  Mental status at baseline for now.    Hypomagnesemia  Assessment & Plan  Replace electrolytes and recheck    Alcohol intoxication (HCC)  Assessment & Plan  Patient presents with an elevated BAL unknown if he is a chronic drinker.    Metabolic acidosis  Assessment & Plan  Resolved, secondary to acute event, hypoperfusion    Hyperglycemia  Assessment & Plan  Low hemoglobin A1c, monitor, insulin per sliding scale    Hypernatremia  Assessment & Plan  Resolved.    WEN (acute kidney injury) (HCC)  Assessment & Plan  Resolved.  Avoid nephrotoxins.  Renal dose all medications.    Acute respiratory failure with hypoxia (HCC)  Assessment & Plan  Patient intubated for airway protection following out-of-hospital arrest  Extubated  Saturating well on room air.       VTE prophylaxis: Lovenox

## 2021-06-29 NOTE — CARE PLAN
Problem: Knowledge Deficit - Standard  Goal: Patient and family/care givers will demonstrate understanding of plan of care, disease process/condition, diagnostic tests and medications  Outcome: Progressing     Problem: Hemodynamics  Goal: Patient's hemodynamics, fluid balance and neurologic status will be stable or improve  Outcome: Progressing     Problem: Fluid Volume  Goal: Fluid volume balance will be maintained  Outcome: Progressing     Problem: Respiratory  Goal: Patient will achieve/maintain optimum respiratory ventilation and gas exchange  Outcome: Progressing     Problem: Skin Integrity  Goal: Skin integrity is maintained or improved  Outcome: Progressing     Problem: Pain - Standard  Goal: Alleviation of pain or a reduction in pain to the patient’s comfort goal  Outcome: Progressing     Shift Goals  Clinical Goals: Safety  Patient Goals: comfort, sleep  Family Goals: SHERI    Progress made toward(s) clinical / shift goals:  Patient has fall precautions in place. Bed in low and locked position. Call light within reach. Patient verbalizes understanding to call for assistance if needed. R Radial site clean dry and intact, no signs of bleeding. Patient educated to limit wrist movement.     Patient is not progressing towards the following goals:none

## 2021-06-29 NOTE — PROGRESS NOTES
Cardiology Follow Up Progress Note    Date of Service  6/29/2021    Attending Physician  Jeremias Dawn M.D.    Chief Complaint/ Reason for EP consult:   Cardiac arrest, consideration for secondary prevention ICD.     HPI  Yasmany Huerta is a 60 y.o. male admitted 6/21/2021 with cardiac arrest.  Unclear if VT vs VF, received rounds of CPR and Defib x 1 via AED.  Was reportedly responsive post arrest then intubated for airway protection.  Transferred to [facility] for cardiac care.  He underwent coronary angiogram which revealed MVD with high grade ostial LAD with collaterals, high grade ostial diagonal, high grade prox and mid LCx, High grade mid RCA and ostial PDA.  He was recommended for CABG, initially declined secondary to neuro status, then discussed again after improvement in neurological status for which he declined and wished to pursue PCI.  PCI preformed 6/28/21 to RCA and LCx.  EP consulted for secondary prevention ICD.     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        Interim Events  Monitored rhythm: Currently sinus rhythm.  No arrhthymias overnight.   Reports ongoing MSK discomfort secondary to CPR, cough when laying down.  No dyspnea.   Labs reviewed: Stable H/H, WBC 11.3, no sig electrolyte abnormalities.      Review of Systems  Review of Systems   Constitutional: Negative for chills, fatigue and fever.   HENT: Negative for congestion and trouble swallowing.    Respiratory: Positive for cough. Negative for chest tightness, shortness of breath and wheezing.    Cardiovascular: Negative for chest pain, palpitations and leg swelling.   Gastrointestinal: Negative for nausea and vomiting.   Musculoskeletal:        Mild MSK chest pain secondary to CPR   Neurological: Positive for weakness (From prior CVA ). Negative for dizziness, syncope, speech difficulty, light-headedness, numbness and headaches.     All other systems reviewed and negative.     Vital signs  in last 24 hours  Temp:  [35.8 °C (96.5 °F)-36.8 °C (98.2 °F)] 36.1 °C (97 °F)  Pulse:  [61-90] 65  Resp:  [16-20] 16  BP: ()/(54-73) 102/55  SpO2:  [92 %-100 %] 95 %    Physical Exam  Physical Exam  Vitals and nursing note reviewed.   Constitutional:       Appearance: Normal appearance.   HENT:      Head: Normocephalic and atraumatic.      Mouth/Throat:      Pharynx: Oropharynx is clear.   Eyes:      Extraocular Movements: Extraocular movements intact.      Conjunctiva/sclera: Conjunctivae normal.      Pupils: Pupils are equal, round, and reactive to light.   Cardiovascular:      Rate and Rhythm: Normal rate and regular rhythm.      Pulses: Normal pulses.      Heart sounds: Normal heart sounds. No murmur heard.   No friction rub. No gallop.    Pulmonary:      Effort: Pulmonary effort is normal.      Breath sounds: Normal breath sounds. No wheezing, rhonchi or rales.   Abdominal:      General: Bowel sounds are normal.   Musculoskeletal:         General: Normal range of motion.      Cervical back: Normal range of motion.      Right lower leg: No edema.      Left lower leg: No edema.   Skin:     General: Skin is warm and dry.   Neurological:      Mental Status: He is alert and oriented to person, place, and time.      Motor: Weakness (Left sided ) present.   Psychiatric:         Mood and Affect: Mood normal.         Behavior: Behavior normal.         Thought Content: Thought content normal.         Judgment: Judgment normal.         Lab Review  Lab Results   Component Value Date/Time    WBC 11.3 (H) 06/29/2021 01:53 AM    RBC 4.33 (L) 06/29/2021 01:53 AM    HEMOGLOBIN 13.8 (L) 06/29/2021 01:53 AM    HEMATOCRIT 41.9 (L) 06/29/2021 01:53 AM    MCV 96.8 06/29/2021 01:53 AM    MCH 31.9 06/29/2021 01:53 AM    MCHC 32.9 (L) 06/29/2021 01:53 AM    MPV 9.2 06/29/2021 01:53 AM      Lab Results   Component Value Date/Time    SODIUM 139 06/29/2021 01:53 AM    POTASSIUM 3.6 06/29/2021 01:53 AM    CHLORIDE 104 06/29/2021  01:53 AM    CO2 22 06/29/2021 01:53 AM    GLUCOSE 103 (H) 06/29/2021 01:53 AM    BUN 9 06/29/2021 01:53 AM    CREATININE 0.65 06/29/2021 01:53 AM      Lab Results   Component Value Date/Time    ASTSGOT 23 06/27/2021 02:18 AM    ALTSGPT 24 06/27/2021 02:18 AM     Lab Results   Component Value Date/Time    CHOLSTRLTOT 151 06/21/2021 08:15 AM    LDL 87 06/21/2021 08:15 AM    HDL 33 (A) 06/21/2021 08:15 AM    TRIGLYCERIDE 155 (H) 06/21/2021 08:15 AM    TROPONINT 569 (H) 06/22/2021 03:30 AM       No results for input(s): NTPROBNP in the last 72 hours.    Cardiac Imaging and Procedures Review  EKG:  My personal interpretation of the EKG dated 6/28/21 is Sinus rhtyhm    Echocardiogram:  6/21/21  CONCLUSIONS  Moderately reduced left ventricular systolic function. Left ventricular   ejection fraction is visually estimated to be 35-40%. Hypokinesis of   the mid to apical inferior wall, mid to distal septum and apical wall   segments. Grade I diastolic dysfunction.  Normal right ventricular size and systolic function.  Normal pericardium without effusion.  No prior study is available for comparison.     Cardiac Catheterization:  6/28/21 intervention report pending.     Imaging    Assessment/Plan  1. Cardiac Arrest/CAD.   - Admitted after suffering OOH cardiac arrest, VT vs VF requiring Defib x 1 with AED and 2 rounds of ACLS until ROSC achieved.   - Cath here revealed MVD, declined CABG.  He is S/P PCI to RCA and LCx 6/28/21 by Dr. Hunter.    - EF found to be low on echo, approximately 35%.   - Class I indication for secondary prevention ICD given prior VT/VF arrest despite attempt of revascularization though not complete.  I have discussed ICD with him today and discussed risks,benefitsm alternative of the device and he is agreeable to proceed.  Will try to schedule for tomorrow, pending cath lab availability.  NPO at MN, Stop PPX Lovenox.  see orders.     2. HFrEF/Ischemic Cardiomyopathy:  - EF found to be 35% on echo.     - Slight dyspnea overnight, cardiology team giving dose of lasix this AM.   - On GDMT with ASA, Plavix, Losartan, Toprol, aldactone, high dose Statin.        JAMES Umaña.   [facility]  [phone]

## 2021-06-29 NOTE — CARE PLAN
Problem: Hemodynamics  Goal: Patient's hemodynamics, fluid balance and neurologic status will be stable or improve  Outcome: Progressing     Problem: Respiratory  Goal: Patient will achieve/maintain optimum respiratory ventilation and gas exchange  Outcome: Progressing     Problem: Physical Regulation  Goal: Diagnostic test results will improve  Outcome: Progressing     Problem: Pain - Standard  Goal: Alleviation of pain or a reduction in pain to the patient’s comfort goal  Outcome: Progressing     Problem: Fall Risk  Goal: Patient will remain free from falls  Outcome: Progressing     Problem: Infection - Standard  Goal: Patient will remain free from infection  Outcome: Progressing     Problem: Mechanical Ventilation  Goal: Safe management of artificial airway and ventilation  Outcome: Met  Goal: Successful weaning off mechanical ventilator, spontaneously maintains adequate gas exchange  Outcome: Met  Goal: Patient will be able to express needs and understand communication  Outcome: Met     The patient is Stable - Low risk of patient condition declining or worsening    Shift Goals  Clinical Goals: Safety, mobilization  Patient Goals: comfort, sleep  Family Goals: SHERI

## 2021-06-29 NOTE — PROGRESS NOTES
At change of shift, this RN was notified by day RN that patient still had femoral sheath in place. Day RN was instructed to let NOC RN know to remove sheath during the night.     This RN notified charge RN to assist with removal of femoral sheath. MD Wallis notified that repeat aPTT needed to be drawn prior to sheath removal and order for femoral sheath removal needed to be placed as well. Orders placed.    Femoral sheath removed by ICU RN around 0500.

## 2021-06-29 NOTE — PROGRESS NOTES
Hospital Medicine Daily Progress Note    Date of Service  6/29/2021    Chief Complaint  60 y.o. male admitted 6/21/2021 with cardiac arrest and a local casino in the [place] area.  The patient was shocked by AED x1 and transported to a local emergency room with pulses present at the time.    Hospital Course  This is a 60-year-old male that was brought to an outlying facility after out of hospital arrest and after initial stabilization transferred to [facility] for further definitive intervention.  Reportedly the patient was found down with an unknown amount of downtime, there was bystander CPR and AED was attached with discharge x1.  EMS arrived and placed a airway and started transport.  The patient had additional several rounds of CPR in transit and then had return of neurologic function.  The patient was combative but was able to tell the paramedics his name.  He then was intubated in the emergency room at the [facility] and brought to [facility].  The patient was given heparin and aspirin initially.  The patient reportedly had a history of stroke with left-sided deficits, after initial presentation he improved but remains still with memory deficits.  The patient went for left heart catheterization that was found to have multivessel significant coronary disease.  Cardiothoracic surgery recommends to not proceed with open heart surgery but alternative treatment.  High risk PCI done 6/28- cardiology following. Plan for ICD tomorrow     Interval Problem Update  Patient seen and examined today.    Patient tolerating treatment and therapies.  All Data, Medication data reviewed.  Case discussed with nursing as available.  Plan of Care reviewed with patient and notified of changes.  6/23 the patient feels tired and is sore in his chest, he still has memory deficits, he has a hard time recalling normal orientation questions.  Alert and oriented x2-3, sinus rhythm in the 70s, blood  pressure 110s over 120s, good urine output, 2 L per nasal cannula oxygen, low-grade temperature, 99.1, remains on heparin drip, aspirin, statin.  Per cardiology the patient is okay to go to telemetry.  Electrolytes being replaced,  6/24: Patient in bed comfortable.  Disoriented and confused occasionally.  Overall mentation slightly improved compared to prior as per staff.  Respiratory status stable hemodynamic stable.  Cardiology recommending transferring to tertiary center for high risk PCI versus CABG.  Transfer process has been initiated.  6/25: Resting in bed comfortable.  Awake and alert.  Oriented x4.  Hemodynamically stable.  Denies any chest pain this morning.  Ambulatory in the room.  Discussed the case with cardiology Dr. Carey who was in contact with Dr. Traylor at staff for for possible transfer.  6/26: Mental status improved and back to baseline.  Per cardiology, CABG can be considered at this point.  However, patient declined.  Planning for high risk PCI on Monday.  Transfer to [place] has been declined due to insurance issues.  Has been ambulatory in the room and down the collado.  No acute distress noted.  No issues overnight per staff.  6/27: Resting in bed comfortable.  Awake and alert.  Oriented x4.  Ambulatory in the room down the collado.  Saturating well on room air.  Hemodynamically stable.  Denies any chest pain.  Planning for high risk PCI tomorrow.  Keep n.p.o. at midnight.  6/28: Resting in bed comfortably.  Has been n.p.o. since midnight for left heart cath with intervention planned for the afternoon.  Hemodynamically and clinically stable.  No acute distress noted.  No issues overnight per staff.  6/29-plan for ICD tomorrow.  No event overnight.  Otherwise stable.  Might need placement    Consultants/Specialty  Critical care  Cardiology  Code Status  Full Code    Disposition  Pending clinical course  Pending transferring to [place] if accepted.  Review of Systems  Review of Systems    Constitutional: Negative for fever.   HENT: Negative for hearing loss.    Eyes: Negative for blurred vision.   Respiratory: Negative for cough.    Cardiovascular: Negative for chest pain and palpitations.   Gastrointestinal: Negative for heartburn.   Genitourinary: Negative for dysuria and urgency.   Musculoskeletal: Negative for myalgias.   Skin: Negative for rash.   Neurological: Positive for focal weakness and weakness.   Psychiatric/Behavioral: Negative for depression.        Physical Exam  Temp:  [35.8 °C (96.5 °F)-36.5 °C (97.7 °F)] 36.4 °C (97.6 °F)  Pulse:  [61-90] 81  Resp:  [16-20] 16  BP: ()/(53-73) 105/67  SpO2:  [92 %-100 %] 95 %    Physical Exam  Vitals and nursing note reviewed.   Constitutional:       General: He is not in acute distress.     Appearance: He is well-developed. He is obese. He is not ill-appearing.      Interventions: Nasal cannula in place.      Comments: Pt seen and examined.   HENT:      Head: Normocephalic and atraumatic.   Eyes:      General:         Right eye: No discharge.         Left eye: No discharge.      Pupils: Pupils are equal, round, and reactive to light.   Cardiovascular:      Rate and Rhythm: Normal rate.      Heart sounds: Normal heart sounds.   Pulmonary:      Effort: Pulmonary effort is normal.      Breath sounds: Rhonchi and rales present.   Abdominal:      General: Abdomen is flat. There is no distension.      Palpations: Abdomen is soft.   Skin:     General: Skin is warm and dry.   Neurological:      Mental Status: He is alert and oriented to person, place, and time.      Motor: Weakness present.   Psychiatric:         Mood and Affect: Mood normal.         Behavior: Behavior normal.         Fluids    Intake/Output Summary (Last 24 hours) at 6/29/2021 1509  Last data filed at 6/29/2021 1300  Gross per 24 hour   Intake 100 ml   Output 1200 ml   Net -1100 ml       Laboratory  Recent Labs     06/27/21  0218 06/29/21  0153   WBC 10.7 11.3*   RBC 4.35* 4.33*    HEMOGLOBIN 14.0 13.8*   HEMATOCRIT 41.2* 41.9*   MCV 94.7 96.8   MCH 32.2 31.9   MCHC 34.0 32.9*   RDW 46.1 48.0   PLATELETCT 317 331   MPV 9.7 9.2     Recent Labs     06/27/21  0218 06/29/21  0153   SODIUM 137 139   POTASSIUM 3.4* 3.6   CHLORIDE 102 104   CO2 24 22   GLUCOSE 103* 103*   BUN 10 9   CREATININE 0.64 0.65   CALCIUM 9.1 8.9     Recent Labs     06/29/21  0153   APTT 27.1               Imaging  US-CAROTID DOPPLER BILAT   Final Result      DX-CHEST-PORTABLE (1 VIEW)   Final Result         Lower lung volume with hypoventilatory change..      DX-CHEST-PORTABLE (1 VIEW)   Final Result      Interval extubation with similar moderate diffuse patchy opacity worrisome for atypical infection or aspiration      EC-ECHOCARDIOGRAM COMPLETE W/O CONT   Final Result      OUTSIDE IMAGES-CT CERVICAL SPINE   Final Result      OUTSIDE IMAGES-CT HEAD   Final Result      OUTSIDE IMAGES-DX CHEST   Final Result      DX-CHEST-PORTABLE (1 VIEW)   Final Result         Intubated.      Patchy bilateral lower lung opacities, atelectasis or infection.      Mildly prominent cardiopericardial silhouette.      CL-LEFT HEART CATHETERIZATION WITH POSSIBLE INTERVENTION    (Results Pending)   CL-LEFT HEART CATHETERIZATION WITH POSSIBLE INTERVENTION    (Results Pending)        Assessment/Plan  * Cardiac arrest (HCC)  Assessment & Plan  Patient found with multivessel coronary disease  Continue telemonitoring  Cardiology consulting  Optimization of vascular compromise underway  6/24: EP recommending ICD placement after revascularization.  6/26: Planning for high risk PCI on Monday.  Planning for ICD placement after revascularization.  6/27: Plan for high risk PCI tomorrow.  This will be followed by ICD placement on different day.  6/29-plan for ICD tomorrow.  Orders per cardiology      Ischemic cardiomyopathy- (present on admission)  Assessment & Plan  Management as above.    CVA (cerebral vascular accident) (HCC)- (present on  admission)  Assessment & Plan  With left-sided deficits, details unknown    Coronary artery disease involving native coronary artery of native heart- (present on admission)  Assessment & Plan  Multivessel,  Cardiothoracic surgery feels the patient is too high risk for surgical intervention  Cardiology following for decision making, possible high risk PCI  6/24: Not candidate for surgical intervention per cardiovascular surgery.  Also not a candidate for high risk PCI.  Possibly planning to transfer to tertiary facility for revascularization versus CABG.   Cardiology in contact with Dr. Traylor at [facility].  6/26: Since mental status improved, patient could be candidate for CABG.  However, he declined CABG.  Planning for high risk PCI Monday 6/27: High risk PCI tomorrow.  6/28: Left heart cath planned for today.  6/29-status post high risk PCI completed successfully.  Plan for ICD tomorrow    Hypophosphatemia  Assessment & Plan  Replace and recheck    Leukemoid reaction- (present on admission)  Assessment & Plan  Resolved, follow    Acute encephalopathy- (present on admission)  Assessment & Plan  Possibly secondary to prolonged downtime, hypoperfusion with anoxic injury  Mental status at baseline for now.    Hypomagnesemia- (present on admission)  Assessment & Plan  Replace electrolytes and recheck    Alcohol intoxication (HCC)- (present on admission)  Assessment & Plan  Patient presents with an elevated BAL unknown if he is a chronic drinker.    Metabolic acidosis- (present on admission)  Assessment & Plan  Resolved, secondary to acute event, hypoperfusion    Hyperglycemia- (present on admission)  Assessment & Plan  Low hemoglobin A1c, monitor, insulin per sliding scale    Hypernatremia- (present on admission)  Assessment & Plan  Resolved.    WEN (acute kidney injury) (HCC)- (present on admission)  Assessment & Plan  Resolved.  Avoid nephrotoxins.  Renal dose all medications.    Acute respiratory failure with  hypoxia (HCC)- (present on admission)  Assessment & Plan  Patient intubated for airway protection following out-of-hospital arrest  Extubated  Saturating well on room air.  Resolved 6/29       VTE prophylaxis: Lovenox

## 2021-06-29 NOTE — PROGRESS NOTES
Assumed patient care. Patient A&O x 4 on 3L O2 via NC. Patient has tele box in place. Patient denies pain at this time. Patient updated on plan of care, verbalizes understanding. Patient has fall precautions in place, call light within reach. Patient has bed in low and locked position. Will continue to monitor.

## 2021-06-30 ENCOUNTER — APPOINTMENT (OUTPATIENT)
Dept: RADIOLOGY | Facility: MEDICAL CENTER | Age: 61
DRG: 224 | End: 2021-06-30
Attending: INTERNAL MEDICINE
Payer: MEDICAID

## 2021-06-30 ENCOUNTER — APPOINTMENT (OUTPATIENT)
Dept: CARDIOLOGY | Facility: MEDICAL CENTER | Age: 61
DRG: 224 | End: 2021-06-30
Attending: NURSE PRACTITIONER
Payer: MEDICAID

## 2021-06-30 LAB
ANION GAP SERPL CALC-SCNC: 8 MMOL/L (ref 7–16)
BUN SERPL-MCNC: 12 MG/DL (ref 8–22)
CALCIUM SERPL-MCNC: 9.1 MG/DL (ref 8.5–10.5)
CHLORIDE SERPL-SCNC: 104 MMOL/L (ref 96–112)
CO2 SERPL-SCNC: 23 MMOL/L (ref 20–33)
CREAT SERPL-MCNC: 0.69 MG/DL (ref 0.5–1.4)
EKG IMPRESSION: NORMAL
ERYTHROCYTE [DISTWIDTH] IN BLOOD BY AUTOMATED COUNT: 46.2 FL (ref 35.9–50)
GLUCOSE BLD-MCNC: 101 MG/DL (ref 65–99)
GLUCOSE BLD-MCNC: 102 MG/DL (ref 65–99)
GLUCOSE BLD-MCNC: 138 MG/DL (ref 65–99)
GLUCOSE BLD-MCNC: 142 MG/DL (ref 65–99)
GLUCOSE SERPL-MCNC: 103 MG/DL (ref 65–99)
HCT VFR BLD AUTO: 42.3 % (ref 42–52)
HGB BLD-MCNC: 14 G/DL (ref 14–18)
INR PPP: 1.16 (ref 0.87–1.13)
MCH RBC QN AUTO: 31.6 PG (ref 27–33)
MCHC RBC AUTO-ENTMCNC: 33.1 G/DL (ref 33.7–35.3)
MCV RBC AUTO: 95.5 FL (ref 81.4–97.8)
PLATELET # BLD AUTO: 334 K/UL (ref 164–446)
PMV BLD AUTO: 9.2 FL (ref 9–12.9)
POTASSIUM SERPL-SCNC: 4.3 MMOL/L (ref 3.6–5.5)
PROTHROMBIN TIME: 14.5 SEC (ref 12–14.6)
RBC # BLD AUTO: 4.43 M/UL (ref 4.7–6.1)
SODIUM SERPL-SCNC: 135 MMOL/L (ref 135–145)
WBC # BLD AUTO: 12.5 K/UL (ref 4.8–10.8)

## 2021-06-30 PROCEDURE — 700102 HCHG RX REV CODE 250 W/ 637 OVERRIDE(OP): Performed by: PHYSICIAN ASSISTANT

## 2021-06-30 PROCEDURE — A9270 NON-COVERED ITEM OR SERVICE: HCPCS | Performed by: NURSE PRACTITIONER

## 2021-06-30 PROCEDURE — 93010 ELECTROCARDIOGRAM REPORT: CPT | Mod: 59 | Performed by: INTERNAL MEDICINE

## 2021-06-30 PROCEDURE — 33249 INSJ/RPLCMT DEFIB W/LEAD(S): CPT | Performed by: INTERNAL MEDICINE

## 2021-06-30 PROCEDURE — 700102 HCHG RX REV CODE 250 W/ 637 OVERRIDE(OP): Performed by: NURSE PRACTITIONER

## 2021-06-30 PROCEDURE — 80048 BASIC METABOLIC PNL TOTAL CA: CPT

## 2021-06-30 PROCEDURE — A9270 NON-COVERED ITEM OR SERVICE: HCPCS | Performed by: HOSPITALIST

## 2021-06-30 PROCEDURE — 770020 HCHG ROOM/CARE - TELE (206)

## 2021-06-30 PROCEDURE — 700111 HCHG RX REV CODE 636 W/ 250 OVERRIDE (IP): Performed by: PHYSICIAN ASSISTANT

## 2021-06-30 PROCEDURE — 99232 SBSQ HOSP IP/OBS MODERATE 35: CPT | Performed by: INTERNAL MEDICINE

## 2021-06-30 PROCEDURE — 99152 MOD SED SAME PHYS/QHP 5/>YRS: CPT | Performed by: INTERNAL MEDICINE

## 2021-06-30 PROCEDURE — 82962 GLUCOSE BLOOD TEST: CPT

## 2021-06-30 PROCEDURE — 0JH608Z INSERTION OF DEFIBRILLATOR GENERATOR INTO CHEST SUBCUTANEOUS TISSUE AND FASCIA, OPEN APPROACH: ICD-10-PCS | Performed by: INTERNAL MEDICINE

## 2021-06-30 PROCEDURE — A9270 NON-COVERED ITEM OR SERVICE: HCPCS | Performed by: PHYSICIAN ASSISTANT

## 2021-06-30 PROCEDURE — 85027 COMPLETE CBC AUTOMATED: CPT

## 2021-06-30 PROCEDURE — 99153 MOD SED SAME PHYS/QHP EA: CPT

## 2021-06-30 PROCEDURE — 71045 X-RAY EXAM CHEST 1 VIEW: CPT

## 2021-06-30 PROCEDURE — 700101 HCHG RX REV CODE 250

## 2021-06-30 PROCEDURE — 700101 HCHG RX REV CODE 250: Performed by: INTERNAL MEDICINE

## 2021-06-30 PROCEDURE — 700102 HCHG RX REV CODE 250 W/ 637 OVERRIDE(OP): Performed by: HOSPITALIST

## 2021-06-30 PROCEDURE — 700111 HCHG RX REV CODE 636 W/ 250 OVERRIDE (IP)

## 2021-06-30 PROCEDURE — 02HK3KZ INSERTION OF DEFIBRILLATOR LEAD INTO RIGHT VENTRICLE, PERCUTANEOUS APPROACH: ICD-10-PCS | Performed by: INTERNAL MEDICINE

## 2021-06-30 PROCEDURE — 99232 SBSQ HOSP IP/OBS MODERATE 35: CPT | Mod: 25 | Performed by: INTERNAL MEDICINE

## 2021-06-30 PROCEDURE — 93005 ELECTROCARDIOGRAM TRACING: CPT | Performed by: INTERNAL MEDICINE

## 2021-06-30 PROCEDURE — 85610 PROTHROMBIN TIME: CPT

## 2021-06-30 PROCEDURE — 02H63KZ INSERTION OF DEFIBRILLATOR LEAD INTO RIGHT ATRIUM, PERCUTANEOUS APPROACH: ICD-10-PCS | Performed by: INTERNAL MEDICINE

## 2021-06-30 PROCEDURE — 36415 COLL VENOUS BLD VENIPUNCTURE: CPT

## 2021-06-30 RX ORDER — CEFAZOLIN SODIUM 1 G/3ML
INJECTION, POWDER, FOR SOLUTION INTRAMUSCULAR; INTRAVENOUS
Status: COMPLETED
Start: 2021-06-30 | End: 2021-06-30

## 2021-06-30 RX ORDER — MIDAZOLAM HYDROCHLORIDE 1 MG/ML
INJECTION INTRAMUSCULAR; INTRAVENOUS
Status: COMPLETED
Start: 2021-06-30 | End: 2021-06-30

## 2021-06-30 RX ORDER — POTASSIUM CHLORIDE 20 MEQ/1
20 TABLET, EXTENDED RELEASE ORAL ONCE
Status: COMPLETED | OUTPATIENT
Start: 2021-06-30 | End: 2021-06-30

## 2021-06-30 RX ORDER — LIDOCAINE HYDROCHLORIDE 20 MG/ML
INJECTION, SOLUTION INFILTRATION; PERINEURAL
Status: COMPLETED
Start: 2021-06-30 | End: 2021-06-30

## 2021-06-30 RX ORDER — FUROSEMIDE 10 MG/ML
20 INJECTION INTRAMUSCULAR; INTRAVENOUS ONCE
Status: COMPLETED | OUTPATIENT
Start: 2021-06-30 | End: 2021-06-30

## 2021-06-30 RX ORDER — TRAMADOL HYDROCHLORIDE 50 MG/1
50 TABLET ORAL EVERY 6 HOURS PRN
Status: DISCONTINUED | OUTPATIENT
Start: 2021-06-30 | End: 2021-07-01 | Stop reason: HOSPADM

## 2021-06-30 RX ORDER — CEFAZOLIN SODIUM 2 G/100ML
2 INJECTION, SOLUTION INTRAVENOUS ONCE
Status: COMPLETED | OUTPATIENT
Start: 2021-06-30 | End: 2021-07-01

## 2021-06-30 RX ADMIN — LIDOCAINE HYDROCHLORIDE: 20 INJECTION, SOLUTION INFILTRATION; PERINEURAL at 14:42

## 2021-06-30 RX ADMIN — ASPIRIN 81 MG: 81 TABLET, COATED ORAL at 05:13

## 2021-06-30 RX ADMIN — MIDAZOLAM HYDROCHLORIDE 2 MG: 1 INJECTION, SOLUTION INTRAMUSCULAR; INTRAVENOUS at 14:54

## 2021-06-30 RX ADMIN — CEFAZOLIN 1000 MG: 330 INJECTION, POWDER, FOR SOLUTION INTRAMUSCULAR; INTRAVENOUS at 14:42

## 2021-06-30 RX ADMIN — INSULIN HUMAN 3 UNITS: 100 INJECTION, SOLUTION PARENTERAL at 21:20

## 2021-06-30 RX ADMIN — MIDAZOLAM HYDROCHLORIDE 2 MG: 1 INJECTION, SOLUTION INTRAMUSCULAR; INTRAVENOUS at 14:42

## 2021-06-30 RX ADMIN — ATORVASTATIN CALCIUM 80 MG: 80 TABLET, FILM COATED ORAL at 17:16

## 2021-06-30 RX ADMIN — POTASSIUM CHLORIDE 20 MEQ: 1500 TABLET, EXTENDED RELEASE ORAL at 17:16

## 2021-06-30 RX ADMIN — LIDOCAINE 1 PATCH: 50 PATCH TOPICAL at 17:16

## 2021-06-30 RX ADMIN — MIDAZOLAM HYDROCHLORIDE 2 MG: 1 INJECTION, SOLUTION INTRAMUSCULAR; INTRAVENOUS at 14:41

## 2021-06-30 RX ADMIN — FENTANYL CITRATE 100 MCG: 50 INJECTION, SOLUTION INTRAMUSCULAR; INTRAVENOUS at 14:41

## 2021-06-30 RX ADMIN — CLOPIDOGREL BISULFATE 75 MG: 75 TABLET ORAL at 05:13

## 2021-06-30 RX ADMIN — FENTANYL CITRATE 100 MCG: 50 INJECTION, SOLUTION INTRAMUSCULAR; INTRAVENOUS at 14:54

## 2021-06-30 RX ADMIN — FUROSEMIDE 20 MG: 10 INJECTION, SOLUTION INTRAVENOUS at 17:16

## 2021-06-30 RX ADMIN — METOPROLOL SUCCINATE 100 MG: 50 TABLET, EXTENDED RELEASE ORAL at 05:12

## 2021-06-30 RX ADMIN — CEFAZOLIN 2000 MG: 330 INJECTION, POWDER, FOR SOLUTION INTRAMUSCULAR; INTRAVENOUS at 14:43

## 2021-06-30 RX ADMIN — ACETAMINOPHEN 650 MG: 325 TABLET, FILM COATED ORAL at 17:35

## 2021-06-30 RX ADMIN — SPIRONOLACTONE 25 MG: 25 TABLET ORAL at 05:12

## 2021-06-30 RX ADMIN — INSULIN HUMAN 3 UNITS: 100 INJECTION, SOLUTION PARENTERAL at 17:21

## 2021-06-30 ASSESSMENT — ENCOUNTER SYMPTOMS
HEARTBURN: 0
VOMITING: 0
SHORTNESS OF BREATH: 0
LIGHT-HEADEDNESS: 0
TROUBLE SWALLOWING: 0
BLURRED VISION: 0
FEVER: 0
CHILLS: 0
WHEEZING: 0
COUGH: 1
WEAKNESS: 1
DEPRESSION: 0
COUGH: 0
NUMBNESS: 0
HEADACHES: 0
NAUSEA: 0
ABDOMINAL PAIN: 0
CHEST TIGHTNESS: 0
MYALGIAS: 0
SHORTNESS OF BREATH: 1
SPEECH DIFFICULTY: 0
DIZZINESS: 0
FATIGUE: 0
PALPITATIONS: 0
FOCAL WEAKNESS: 1

## 2021-06-30 ASSESSMENT — PAIN DESCRIPTION - PAIN TYPE
TYPE: ACUTE PAIN

## 2021-06-30 NOTE — PROGRESS NOTES
Assumed care of pt. Bedside report received from night shift RNLaurence. Pt aaox4, on RA and no signs of distress noted at this time. Tele box is on and rhythm verified. POC discussed with pt and pt verbalized no questions at this time. Bed low and locked. Call light and personal belongings within reach. All needs met at this time. Will continue POC

## 2021-06-30 NOTE — PROGRESS NOTES
Hospital Medicine Daily Progress Note    Date of Service  6/30/2021    Chief Complaint  60 y.o. male admitted 6/21/2021 with cardiac arrest and a local casino in the [place] area.  The patient was shocked by AED x1 and transported to a local emergency room with pulses present at the time.    Hospital Course  This is a 60-year-old male that was brought to an outlying facility after out of hospital arrest and after initial stabilization transferred to [facility] for further definitive intervention.  Reportedly the patient was found down with an unknown amount of downtime, there was bystander CPR and AED was attached with discharge x1.  EMS arrived and placed a airway and started transport.  The patient had additional several rounds of CPR in transit and then had return of neurologic function.  The patient was combative but was able to tell the paramedics his name.  He then was intubated in the emergency room at the [facility] and brought to [facility].  The patient was given heparin and aspirin initially.  The patient reportedly had a history of stroke with left-sided deficits, after initial presentation he improved but remains still with memory deficits.  The patient went for left heart catheterization that was found to have multivessel significant coronary disease.  Cardiothoracic surgery recommends to not proceed with open heart surgery but alternative treatment.  High risk PCI done 6/28- cardiology following. Plan for ICD tomorrow     Interval Problem Update  Patient seen and examined today.    Patient tolerating treatment and therapies.  All Data, Medication data reviewed.  Case discussed with nursing as available.  Plan of Care reviewed with patient and notified of changes.  6/23 the patient feels tired and is sore in his chest, he still has memory deficits, he has a hard time recalling normal orientation questions.  Alert and oriented x2-3, sinus rhythm in the 70s, blood  pressure 110s over 120s, good urine output, 2 L per nasal cannula oxygen, low-grade temperature, 99.1, remains on heparin drip, aspirin, statin.  Per cardiology the patient is okay to go to telemetry.  Electrolytes being replaced,  6/24: Patient in bed comfortable.  Disoriented and confused occasionally.  Overall mentation slightly improved compared to prior as per staff.  Respiratory status stable hemodynamic stable.  Cardiology recommending transferring to tertiary center for high risk PCI versus CABG.  Transfer process has been initiated.  6/25: Resting in bed comfortable.  Awake and alert.  Oriented x4.  Hemodynamically stable.  Denies any chest pain this morning.  Ambulatory in the room.  Discussed the case with cardiology Dr. Carey who was in contact with Dr. Traylor at staff for for possible transfer.  6/26: Mental status improved and back to baseline.  Per cardiology, CABG can be considered at this point.  However, patient declined.  Planning for high risk PCI on Monday.  Transfer to [place] has been declined due to insurance issues.  Has been ambulatory in the room and down the collado.  No acute distress noted.  No issues overnight per staff.  6/27: Resting in bed comfortable.  Awake and alert.  Oriented x4.  Ambulatory in the room down the collado.  Saturating well on room air.  Hemodynamically stable.  Denies any chest pain.  Planning for high risk PCI tomorrow.  Keep n.p.o. at midnight.  6/28: Resting in bed comfortably.  Has been n.p.o. since midnight for left heart cath with intervention planned for the afternoon.  Hemodynamically and clinically stable.  No acute distress noted.  No issues overnight per staff.  6/29-plan for ICD tomorrow.  No event overnight.  Otherwise stable.  Might need placement  6/30- no event overnight. No concerns. Pending ICD placement. Looking forward to go home     Consultants/Specialty  Critical care  Cardiology  Code Status  Full Code    Disposition  Goal home with home health  once cleared by cardiology     Review of Systems  Review of Systems   Constitutional: Negative for fever.   HENT: Negative for hearing loss.    Eyes: Negative for blurred vision.   Respiratory: Positive for shortness of breath. Negative for cough.    Cardiovascular: Negative for chest pain and palpitations.   Gastrointestinal: Negative for abdominal pain (tenderness ) and heartburn.   Genitourinary: Negative for dysuria and urgency.   Musculoskeletal: Negative for myalgias.   Skin: Negative for rash.   Neurological: Positive for focal weakness and weakness.   Psychiatric/Behavioral: Negative for depression.        Physical Exam  Temp:  [36.1 °C (97 °F)-36.8 °C (98.2 °F)] 36.3 °C (97.3 °F)  Pulse:  [64-75] 64  Resp:  [17-18] 17  BP: (106-126)/(59-65) 114/65  SpO2:  [94 %-100 %] 96 %    Physical Exam  Vitals and nursing note reviewed.   Constitutional:       General: He is not in acute distress.     Appearance: He is well-developed. He is obese. He is not ill-appearing.      Interventions: Nasal cannula in place.      Comments: Pt seen and examined.   HENT:      Head: Normocephalic and atraumatic.   Eyes:      General:         Right eye: No discharge.         Left eye: No discharge.      Pupils: Pupils are equal, round, and reactive to light.   Cardiovascular:      Rate and Rhythm: Normal rate.      Heart sounds: Normal heart sounds.   Pulmonary:      Effort: Pulmonary effort is normal.      Breath sounds: Rhonchi and rales present.   Abdominal:      General: Abdomen is flat. There is no distension.      Palpations: Abdomen is soft.   Skin:     General: Skin is warm and dry.   Neurological:      Mental Status: He is alert and oriented to person, place, and time.      Motor: Weakness present.   Psychiatric:         Mood and Affect: Mood normal.         Behavior: Behavior normal.         Fluids    Intake/Output Summary (Last 24 hours) at 6/30/2021 1241  Last data filed at 6/30/2021 0334  Gross per 24 hour   Intake --    Output 1650 ml   Net -1650 ml       Laboratory  Recent Labs     06/29/21  0153 06/30/21  0245   WBC 11.3* 12.5*   RBC 4.33* 4.43*   HEMOGLOBIN 13.8* 14.0   HEMATOCRIT 41.9* 42.3   MCV 96.8 95.5   MCH 31.9 31.6   MCHC 32.9* 33.1*   RDW 48.0 46.2   PLATELETCT 331 334   MPV 9.2 9.2     Recent Labs     06/29/21  0153 06/30/21  0245   SODIUM 139 135   POTASSIUM 3.6 4.3   CHLORIDE 104 104   CO2 22 23   GLUCOSE 103* 103*   BUN 9 12   CREATININE 0.65 0.69   CALCIUM 8.9 9.1     Recent Labs     06/29/21  0153 06/30/21  0245   APTT 27.1  --    INR  --  1.16*               Imaging  US-CAROTID DOPPLER BILAT   Final Result      DX-CHEST-PORTABLE (1 VIEW)   Final Result         Lower lung volume with hypoventilatory change..      DX-CHEST-PORTABLE (1 VIEW)   Final Result      Interval extubation with similar moderate diffuse patchy opacity worrisome for atypical infection or aspiration      EC-ECHOCARDIOGRAM COMPLETE W/O CONT   Final Result      OUTSIDE IMAGES-CT CERVICAL SPINE   Final Result      OUTSIDE IMAGES-CT HEAD   Final Result      OUTSIDE IMAGES-DX CHEST   Final Result      DX-CHEST-PORTABLE (1 VIEW)   Final Result         Intubated.      Patchy bilateral lower lung opacities, atelectasis or infection.      Mildly prominent cardiopericardial silhouette.      CL-LEFT HEART CATHETERIZATION WITH POSSIBLE INTERVENTION    (Results Pending)   CL-LEFT HEART CATHETERIZATION WITH POSSIBLE INTERVENTION    (Results Pending)   CL-IMPLANTABLE CARDIOVERTER DEFIBRILLATOR    (Results Pending)        Assessment/Plan  * Cardiac arrest (HCC)- (present on admission)  Assessment & Plan  Patient found with multivessel coronary disease  Continue telemonitoring  Cardiology consulting  Optimization of vascular compromise underway  6/24: EP recommending ICD placement after revascularization.  6/26: Planning for high risk PCI on Monday.  Planning for ICD placement after revascularization.  6/27: Plan for high risk PCI tomorrow.  This will be  followed by ICD placement on different day.  6/29-plan for ICD tomorrow.  Orders per cardiology  6/20- plan for ICD today.  Medication titration per cardiology.  ~On Lasix IV.  Transition to oral once okay per cardiology. patient will be discharged once cleared by cardiology    Ischemic cardiomyopathy- (present on admission)  Assessment & Plan  Management as above.    CVA (cerebral vascular accident) (HCC)- (present on admission)  Assessment & Plan  With left-sided deficits, details unknown    Coronary artery disease involving native coronary artery of native heart- (present on admission)  Assessment & Plan  Multivessel,  Cardiothoracic surgery feels the patient is too high risk for surgical intervention  Cardiology following for decision making, possible high risk PCI  6/24: Not candidate for surgical intervention per cardiovascular surgery.  Also not a candidate for high risk PCI.  Possibly planning to transfer to tertiary facility for revascularization versus CABG.   Cardiology in contact with Dr. Traylor at [place].  6/26: Since mental status improved, patient could be candidate for CABG.  However, he declined CABG.  Planning for high risk PCI Monday 6/27: High risk PCI tomorrow.  6/28: Left heart cath planned for today.  6/29-status post high risk PCI completed successfully.  Plan for ICD tomorrow  6/30- ICD today. meds titration per cards     Hypophosphatemia- (present on admission)  Assessment & Plan  Replace and recheck    Leukemoid reaction- (present on admission)  Assessment & Plan  Resolved, follow    Acute encephalopathy- (present on admission)  Assessment & Plan  Possibly secondary to prolonged downtime, hypoperfusion with anoxic injury  Mental status at baseline for now.    Hypomagnesemia- (present on admission)  Assessment & Plan  Replace electrolytes and recheck    Alcohol intoxication (HCC)- (present on admission)  Assessment & Plan  Patient presents with an elevated BAL unknown if he is a  chronic drinker.    Metabolic acidosis- (present on admission)  Assessment & Plan  Resolved, secondary to acute event, hypoperfusion    Hyperglycemia- (present on admission)  Assessment & Plan  Low hemoglobin A1c, monitor, insulin per sliding scale    Hypernatremia- (present on admission)  Assessment & Plan  Resolved.    WEN (acute kidney injury) (HCC)- (present on admission)  Assessment & Plan  Resolved.  Avoid nephrotoxins.  Renal dose all medications.    Acute respiratory failure with hypoxia (HCC)- (present on admission)  Assessment & Plan  Patient intubated for airway protection following out-of-hospital arrest  Extubated  Saturating well on room air.  Resolved 6/29       VTE prophylaxis: Lovenox

## 2021-06-30 NOTE — PROGRESS NOTES
Cardiology Follow Up Progress Note    Date of Service  6/30/2021    Attending Physician  Jeremias Dawn M.D.    Chief Complaint/ Reason for EP consult:   Cardiac arrest, consideration for secondary prevention ICD.     HPI  Yasmany Huerta is a 60 y.o. male admitted 6/21/2021 with cardiac arrest.  Unclear if VT vs VF, received Defib x 1 via AED and 2 rounds of ACLS after EMS arrival before return of ROSC.  Was reportedly responsive post arrest then intubated for airway protection.  Transferred to [facility] for cardiac care.  He underwent coronary angiogram which revealed MVD with high grade ostial and mid LAD with distal collaterals, high grade ostial diagonal, high grade prox and mid LCx, High grade mid RCA and ostial PDA.  He was recommended for CABG, initially declined secondary to neuro status, then discussed again after improvement in neurological status for which he declined and wished to pursue high risk PCI.  PCI preformed 6/28/21 to RCA, LCx and Ramus.  EP consulted for secondary prevention ICD.     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        Interim Events  Monitored rhythm: Currently sinus rhythm.  No arrhthymias overnight.   Continued MSK chest pain secondary to CPR.  No angina.  PND improved with mike diuresis yesterday.    VS stable.  Afebrile.   Labs reviewed: Stable H/H, WBC 12.5, no sig electrolyte abnormalities.      Review of Systems  Review of Systems   Constitutional: Negative for chills, fatigue and fever.   HENT: Negative for congestion and trouble swallowing.    Respiratory: Positive for cough. Negative for chest tightness, shortness of breath and wheezing.    Cardiovascular: Negative for chest pain, palpitations and leg swelling.   Gastrointestinal: Negative for nausea and vomiting.   Musculoskeletal:        Mild MSK chest pain secondary to CPR   Neurological: Positive for weakness (From prior CVA ). Negative for dizziness, syncope,  speech difficulty, light-headedness, numbness and headaches.     All other systems reviewed and negative.     Vital signs in last 24 hours  Temp:  [36.1 °C (97 °F)-36.8 °C (98.2 °F)] 36.1 °C (97 °F)  Pulse:  [65-81] 65  Resp:  [16-18] 18  BP: (102-126)/(53-69) 126/63  SpO2:  [92 %-100 %] 100 %    Physical Exam  Physical Exam  Vitals and nursing note reviewed.   Constitutional:       Appearance: Normal appearance.   HENT:      Head: Normocephalic and atraumatic.      Mouth/Throat:      Pharynx: Oropharynx is clear.   Eyes:      Extraocular Movements: Extraocular movements intact.      Conjunctiva/sclera: Conjunctivae normal.      Pupils: Pupils are equal, round, and reactive to light.   Cardiovascular:      Rate and Rhythm: Normal rate and regular rhythm.      Pulses: Normal pulses.      Heart sounds: Normal heart sounds. No murmur heard.   No friction rub. No gallop.    Pulmonary:      Effort: Pulmonary effort is normal.      Breath sounds: Normal breath sounds. No wheezing, rhonchi or rales.   Abdominal:      General: Bowel sounds are normal.   Musculoskeletal:         General: Normal range of motion.      Cervical back: Normal range of motion.      Right lower leg: No edema.      Left lower leg: No edema.   Skin:     General: Skin is warm and dry.   Neurological:      Mental Status: He is alert and oriented to person, place, and time.      Motor: Weakness (Left sided ) present.   Psychiatric:         Mood and Affect: Mood normal.         Behavior: Behavior normal.         Thought Content: Thought content normal.         Judgment: Judgment normal.         Lab Review  Lab Results   Component Value Date/Time    WBC 12.5 (H) 06/30/2021 02:45 AM    RBC 4.43 (L) 06/30/2021 02:45 AM    HEMOGLOBIN 14.0 06/30/2021 02:45 AM    HEMATOCRIT 42.3 06/30/2021 02:45 AM    MCV 95.5 06/30/2021 02:45 AM    MCH 31.6 06/30/2021 02:45 AM    MCHC 33.1 (L) 06/30/2021 02:45 AM    MPV 9.2 06/30/2021 02:45 AM      Lab Results   Component  Value Date/Time    SODIUM 135 06/30/2021 02:45 AM    POTASSIUM 4.3 06/30/2021 02:45 AM    CHLORIDE 104 06/30/2021 02:45 AM    CO2 23 06/30/2021 02:45 AM    GLUCOSE 103 (H) 06/30/2021 02:45 AM    BUN 12 06/30/2021 02:45 AM    CREATININE 0.69 06/30/2021 02:45 AM      Lab Results   Component Value Date/Time    ASTSGOT 23 06/27/2021 02:18 AM    ALTSGPT 24 06/27/2021 02:18 AM     Lab Results   Component Value Date/Time    CHOLSTRLTOT 151 06/21/2021 08:15 AM    LDL 87 06/21/2021 08:15 AM    HDL 33 (A) 06/21/2021 08:15 AM    TRIGLYCERIDE 155 (H) 06/21/2021 08:15 AM    TROPONINT 569 (H) 06/22/2021 03:30 AM       No results for input(s): NTPROBNP in the last 72 hours.    Cardiac Imaging and Procedures Review  EKG:  My personal interpretation of the EKG dated 6/28/21 is Sinus rhtyhm    Echocardiogram:  6/21/21  CONCLUSIONS  Moderately reduced left ventricular systolic function. Left ventricular   ejection fraction is visually estimated to be 35-40%. Hypokinesis of   the mid to apical inferior wall, mid to distal septum and apical wall   segments. Grade I diastolic dysfunction.  Normal right ventricular size and systolic function.  Normal pericardium without effusion.  No prior study is available for comparison.     Cardiac Catheterization:  6/28/21 intervention report pending.     Imaging    Assessment/Plan  1. Cardiac Arrest   - Admitted after suffering OOH cardiac arrest, VT vs VF requiring Defib x 1 with AED and 2 rounds of ACLS until ROSC achieved.  No STEMI on EKG.  Trop peak 582 post arrest.  Diagnostic alcohol elevated at time of admission.      - Cath here revealed MVD, declined CABG.  He is S/P PCI to RCA, LCx and Ramus 6/28/21 by Dr. Hunter.    - EF found to be depressed on echo, approximately 35%.   - Indication for secondary prevention ICD given VT/VF arrest.  Scheduled for secondary prevention ICD today with Dr. Camacho.  Device to be placed left sided.     I have discussed ICD with him today and discussed  risks, benefits and alternatives of the device and he remains agreeable to proceed.  - - Please continue NPO.     The risk, benefits, and alternatives to ICD placement were discussed in great detail, specific risks mentioned including bleeding, infection, cardiac perforation with possible tamponade requiring pericardiocentesis or open heart surgery.  In addition the possibility of lead dislodgment (2-3%), inappropriate shocks, pneumothorax (3%), hemothorax were discussed. Also mentioned were the possibility of death, stroke, and myocardial infarction. The patient verbalized understanding of these potential complications and wishes to proceed with this procedure.       2. HFrEF/Ischemic Cardiomyopathy:  - EF found to be 35% on echo.    - S/P PCI with IVAN x 5 to RCA, LCx and Ramus.  Declined CABG.   of LAD.    - Plan for mike diuresis today after ICD by primary cards team.  Able to lay flat currently.    - On GDMT with ASA, Plavix, Losartan, Toprol, aldactone, high dose Statin.        JAMES Umaña.   [facility]  [phone]

## 2021-06-30 NOTE — OP REPORT
Electrophysiology Procedure Note  [facility]      Date of procedure: 6/30/2021     Procedure Performed: Placement of AICD, moderate sedation administered by RN and supervised by physician    Indication: Cardiac arrest, secondary prevention, ischemic cardiomyopathy    Physician(s): LIZETTE Camacho MD    Anesthesia: Moderate sedation,  start time 1427, stop time 1457  the moderate sedation document has been reviewed, signed and scanned into media     Specimen(s) Removed: None     Estimated Blood Loss:  30cc    Complications: None    Pre Procedure ECG: Sinus rhythm    Post Procedure ECG: Sinus rhythm    DESCRIPTION OF PROCEDURE: After informed written consent, the patient was brought to the electrophysiology lab in the fasting, unsedated state. The patient was prepped and draped in the usual sterile fashion. The procedure was performed under moderate sedation with local anesthetic. A left infraclavicular incision was made with a scalpel and the pre-pectoral device pocket was created using a combination of blunt dissection and electrocautery. The modified Seldinger technique was used to gain access to the left axillary vein times 2  . A peel-away hemostasis sheath was placed in the vein. Under fluoroscopic guidance, the ICD lead was introduced into the heart. The ventricular lead was advanced into the RVOT position the pulled back and advanced to the RV apex. The lead was tested and had satisfactory sensing and pacing parameters. Another peel-away hemostasis sheath was placed in the vein.  The Right atrial lead was placed and tested with good numbers and fixated with the normal mechanism. High output pacing did not produce extracardiac stimulation in either the RA or RV lead.  The leads were sutured to the underlying pectoral muscle with interrupted silk over a silastic suture sleeve. The device pocket was irrigated with antibiotic solution, inspected, and no bleeding was seen. The leads were connected to  the ICD pulse generator and the device was inserted into the pocket. The wound was closed with three layers of absorbable sutures and covered with Steri-Strips.   I personally supervised the administration of moderate sedation by the RN and observed the level of consciousness and physiologic status throughout the procedure.  Following recovery from sedation, the patient was transferred to a monitored bed.    IMPLANTED DEVICE INFORMATION:    Pulse generator is a Medtronic model GIPH1A6   Serial number [id number]     LEAD INFORMATION:  1. Right atrial lead is a Medtronic model 5076-52, serial number [id number], P wave 2 millivolts, threshold 0.5  volts, pacing impedance 532 ohms.  2. Right ventricular lead is a Medtronic model 0295P51, serial number [id number], R wave 7 millivolts, threshold 0.75 volts , pacing impedance 627 ohms.     DEVICE PROGRAMMING:    Arjun therapy: MVP  Tachy therapy: two zone    DEFIBRILLATION THRESHOLD TESTING:    Not performed    FLUOROSCOPY TIME: 19 mGy    SUMMARY/CONCLUSIONS:  1. Successful dual-chamber defibrillator      RECOMMENDATIONS:  1. Admit to monitored bed.  2. PA and lateral chest x-ray.  3. Implantable cardioverter defibrillator interrogation prior to hospital discharge.  4. Follow-up in device clinic for wound check and device interrogation.

## 2021-06-30 NOTE — THERAPY
"Physical Therapy   Daily Treatment     Patient Name: Yasmany Huerta  Age:  60 y.o., Sex:  male  Medical Record #: [id number]  Today's Date: 6/29/2021     Precautions: Cardiac Precautions (See Comments), Fall Risk    Assessment    Patient met all mobility goals during today's session. He demonstrated safety with ambulation with a quad cane and supervision, and with ascending/descending 2 steps with a quad cane and min A for safety. Patient reports he has a Hurrycane at home that will work better for him than the quad cane he used in the hospital. He was educated on signs of a cardiac event and graded return to exercise. he demonstrated understanding of education. Anticipate patient to be able to safely return home once DC. Recommend home health once medically cleared to optimize mobility and to reinforce cardiac precautions. Patient will not be actively followed for physical therapy services at this time, however may be seen if requested by physician for 1 more visit within 30 days to address any discharge or equipment needs.    Plan    Discharge secondary to goals met.    DC Equipment Recommendations: None  Discharge Recommendations: Recommend home health for continued physical therapy services      Subjective    \"I get around better with my Hurrycane.\"     Objective       06/29/21 1643   Cosignature   Documentation Review Approved with modifications made by preceptor in flowsheet   Total Time Spent   Total Time Spent (Mins) 33   Charge Group   Charges  Yes   PT Therapeutic Activities 2   Precautions   Precautions Cardiac Precautions (See Comments);Fall Risk   Comments L hemiparesis from prior CVA, EF 35-45%, s/p PCI   Vitals   O2 (LPM) 0   O2 Delivery Device None - Room Air   Pain 0 - 10 Group   Therapist Pain Assessment Nurse Notified  (no c/o of pain)   Cognition    Cognition / Consciousness WDL   Level of Consciousness Alert   Comments pleasant and cooperative   Passive ROM Lower Body   Passive ROM Lower Body WDL "   Comments not formally tested, WFL for mobility   Active ROM Lower Body    Active ROM Lower Body  X   Comments LLE weakness from prior CVA   Strength Lower Body   Lower Body Strength  X   Comments LLE grossly <3/5   Sensation Lower Body   Lower Extremity Sensation   X   Comments not formally tested, patient reported deficits LLE   Lower Body Muscle Tone   Lower Body Muscle Tone  WDL   Neurological Concerns   Neurological Concerns Yes   Comments L-sided weakness from prior CVA   Balance   Sitting Balance (Static) Fair +   Sitting Balance (Dynamic) Fair +   Standing Balance (Static) Fair   Standing Balance (Dynamic) Fair   Weight Shift Sitting Good   Weight Shift Standing Fair   Skilled Intervention Tactile Cuing;Verbal Cuing;Compensatory Strategies;Facilitation   Comments w/ quad cane, no overt LOB   Gait Analysis   Gait Level Of Assist Supervised   Assistive Device Quad Cane   Distance (Feet) 150   # of Times Distance was Traveled 1   # of Stairs Climbed 2   Level of Assist with Stairs Minimal Assist  (for safety)   Weight Bearing Status FWB   Skilled Intervention Facilitation;Verbal Cuing;Tactile Cuing   Comments LLE foot abduction and circumduction to clear foot during swing phase due to DF weakness, decreased stance time on LLE, step length, and jeimy   Bed Mobility    Supine to Sit Supervised   Sit to Supine Supervised   Scooting Supervised  (to EOB)   Rolling Supervised   Skilled Intervention Facilitation;Verbal Cuing   Functional Mobility   Sit to Stand Supervised   Bed, Chair, Wheelchair Transfer Supervised   Transfer Method Stand Step   Skilled Intervention Facilitation;Compensatory Strategies;Verbal Cuing   How much difficulty does the patient currently have...   Turning over in bed (including adjusting bedclothes, sheets and blankets)? 4   Sitting down on and standing up from a chair with arms (e.g., wheelchair, bedside commode, etc.) 3   Moving from lying on back to sitting on the side of the bed? 3    How much help from another person does the patient currently need...   Moving to and from a bed to a chair (including a wheelchair)? 4   Need to walk in a hospital room? 4   Climbing 3-5 steps with a railing? 3   6 clicks Mobility Score 21   Activity Tolerance   Sitting in Chair NT - returned to bed   Sitting Edge of Bed 5 min   Standing 7 min   Comments no report of fatigue, dizziness, but WOB slightly increased upon return to room   Short Term Goals    Short Term Goal # 1 Pt will perform supine <> sit with SPV in 6 visits to get in/out of bed   Goal Outcome # 1 Goal met   Short Term Goal # 2 Pt will perform functional transfers with SPV in 6 visits to increase independence   Goal Outcome # 2 Goal met   Short Term Goal # 3 Pt will ambulate 50ft with quad cane and min assist to increase independence   Goal Outcome # 3 Goal met   Short Term Goal # 4 Pt will ascend/descend 2 steps with min assist in 6 visits to enter home   Goal Outcome # 4 Goal met   Education Group   Education Provided Role of Physical Therapist;Stair Training;Cardiac Precautions   Cardiac Precautions Patient Response Patient;Acceptance;Explanation;Verbal Demonstration  (regarding graded return to activity, signs of a cadiac event)   Role of Physical Therapist Patient Response Patient;Acceptance;Explanation;Demonstration;Verbal Demonstration;Action Demonstration   Anticipated Discharge Equipment and Recommendations   DC Equipment Recommendations None   Discharge Recommendations Recommend home health for continued physical therapy services   Interdisciplinary Plan of Care Collaboration   IDT Collaboration with  Nursing   Patient Position at End of Therapy In Bed;Call Light within Reach;Tray Table within Reach;Phone within Reach   Collaboration Comments discussed with RN   Session Information   Date / Session Number  6/29 - 2(2/3, 7/5)   Priority   (.)

## 2021-06-30 NOTE — PROGRESS NOTES
[facility] Cardiology Progress Note    Name:   Yasmany Huerta   YOB: 1960  Age:   60 y.o.  male   MRN:   [id number]    Consulting Cardiologist: Dr. Hi    Chief Complaint: cardiac arrest     Past Medical History:  61yo male with no prior cardiac history hospitalized for cardiac arrest (VF vs VT). Found to have multiple vessel CAD, ultimately CABG was recommended however patient declined and elected to go for high risk PCI. This was performed 6/28/21 s/p RCA, LCx and Ramus stenting.     History of Present Illness:  No overnight events.  No arrhythmias on telemetry.  He had a good response to the Lasix, reports improved PND.  No chest pressure or significant dyspnea while working with physical therapy yesterday.  No leg swelling.  Pleuritic pain has improved, still has chest wall pain with coughing, sneezing.    ROS    -Unchanged from 6/29 except for ROS as noted in HPI  Constitutional:  + fatigue (poor sleep).  Weight loss this hospital stay, 230lbs today.  Respiratory:  Denies shortness of breath, + productive cough.  Cardiovascular:  +chest wall pain.  No lower extremity edema,  No orthopnea, + PND. Denies palpitations.  : No polyuria, no dysuria. No hematuria.  GI:  Denies nausea/vomiting. No abdominal distention or early satiety. No melena/blood stools.  Neuro:  Denies dizziness, syncope.  Hem/lymph: Denies easy bleeding/bruising.    MS: + Chest wall pain  Skin: Denies rash, open wound    All other review of systems reviewed and negative.      History reviewed. No pertinent surgical history.    History reviewed. No pertinent family history.    Social History     Socioeconomic History   • Marital status: Single     Spouse name: Not on file   • Number of children: Not on file   • Years of education: Not on file   • Highest education level: Not on file   Occupational History   • Not on file   Tobacco Use   • Smoking status: Never Smoker   • Smokeless tobacco: Never Used   Vaping Use   •  "Vaping Use: Never used   Substance and Sexual Activity   • Alcohol use: Yes   • Drug use: Never   • Sexual activity: Not on file   Other Topics Concern   • Not on file   Social History Narrative   • Not on file     Social Determinants of Health       Medications / Drug list prior to admission:  No current facility-administered medications on file prior to encounter.     No current outpatient medications on file prior to encounter.       No Known Allergies    Physical Exam  Body mass index is 33.12 kg/m². /61   Pulse 65   Temp 36.2 °C (97.1 °F) (Temporal)   Resp 17   Ht 1.778 m (5' 10\")   Wt 105 kg (230 lb 13.2 oz)   SpO2 96%    Vitals:    06/29/21 2000 06/29/21 2356 06/30/21 0400 06/30/21 0509   BP: 107/60 120/59 106/60 106/61   Pulse: 71 69 65 65   Resp: 17 17 17 17   Temp: 36.4 °C (97.5 °F) 36.2 °C (97.2 °F) 36.2 °C (97.1 °F)    TempSrc: Temporal Temporal Temporal    SpO2: 95% 94% 96%    Weight:       Height:        Oxygen Therapy:  Pulse Oximetry: 96 %, O2 (LPM): 0, O2 Delivery Device: None - Room Air    Exam performed and unchanged from 6/29  General: Well appearing, in no distress, appears less anxious  Eyes: nl conjunctiva, no scleral icterus  Neck: ~8 cm JVP  Lungs: normal respiratory effort, No rales, no wheezing or rhonchi.  Heart: RRR, no murmurs, no rubs or gallops, no edema in bilateral lower extremities. Right wrist is soft, mild bruising, radial pulse is 2+. Right groin is soft without bleeding or hematoma, right leg is warm, 2+ bilateral dp pulses.  Abdomen: soft, non tender, non distended  Extremities/MSK: Warm, well perfused, no clubbing, no cyanosis  Neurological: No focal sensory deficits, normal gait  Psychiatric: Appropriate affect, A/O x 3, intact judgement and insight  Skin: No rashes, warm extremities      Labs (personally reviewed):     Lab Results   Component Value Date/Time    SODIUM 135 06/30/2021 02:45 AM    POTASSIUM 4.3 06/30/2021 02:45 AM    CHLORIDE 104 06/30/2021 02:45 " AM    CO2 23 06/30/2021 02:45 AM    GLUCOSE 103 (H) 06/30/2021 02:45 AM    BUN 12 06/30/2021 02:45 AM    CREATININE 0.69 06/30/2021 02:45 AM     Lab Results   Component Value Date/Time    ALKPHOSPHAT 79 06/27/2021 02:18 AM    ASTSGOT 23 06/27/2021 02:18 AM    ALTSGPT 24 06/27/2021 02:18 AM    TBILIRUBIN 0.5 06/27/2021 02:18 AM      Lab Results   Component Value Date/Time    CHOLSTRLTOT 151 06/21/2021 08:15 AM    LDL 87 06/21/2021 08:15 AM    HDL 33 (A) 06/21/2021 08:15 AM    TRIGLYCERIDE 155 (H) 06/21/2021 08:15 AM     No results found for: BNPBTYPENAT    Telemetry Review:  No sustained arrhythmias overnight, sinus rhythm with ectopy    Assessment and Medical Decision Making:  Cardiac Arrest  - plan for secondary prevention ICD this admission. See EP's recommendations  - no ventricular arrhythmias on telemetry    Multi-vessel CAD  - S/P IVAN to RCA, LCx and Ramus 6/28. LAD occluded ostially with collaterals from RCA  -Without angina currently  - aspirin and plavix daily  - Lipitor 80mg  - Metoprolol     Acute HFrEF Stage C NYHA class III  Ischemic Cardiomyopathy  - Metoprolol   - Losartan 50  - Spironolactone 25mg  - weight 239 on admission, 230 6/28. PND improved with diuresis, dose lasix 20mg IV again today after his ICD placement      NPO for ICD placement today.    Future Appointments   Date Time Provider Department Center   7/7/2021  1:45 PM YANELIS Baker. [facility] None       Renetta Cantu PA-C  [facility]

## 2021-06-30 NOTE — CARE PLAN
Problem: Knowledge Deficit - Standard  Goal: Patient and family/care givers will demonstrate understanding of plan of care, disease process/condition, diagnostic tests and medications  Outcome: Progressing     Problem: Hemodynamics  Goal: Patient's hemodynamics, fluid balance and neurologic status will be stable or improve  Outcome: Progressing     Problem: Fluid Volume  Goal: Fluid volume balance will be maintained  Outcome: Progressing     Problem: Respiratory  Goal: Patient will achieve/maintain optimum respiratory ventilation and gas exchange  Outcome: Progressing     Problem: Skin Integrity  Goal: Skin integrity is maintained or improved  Outcome: Progressing     Problem: Pain - Standard  Goal: Alleviation of pain or a reduction in pain to the patient’s comfort goal  Outcome: Progressing     Problem: Fall Risk  Goal: Patient will remain free from falls  Outcome: Progressing       Shift Goals  Clinical Goals: Mobilization, safety  Patient Goals: comfort, sleep  Family Goals: SHERI    Progress made toward(s) clinical / shift goals:  Patient has fall precautions in place and verbalizes understanding to call for assistance if needed.    Patient is not progressing towards the following goals:none

## 2021-06-30 NOTE — FACE TO FACE
Face to Face Supporting Documentation - Home Health    The encounter with this patient was in whole or in part the primary reason for home health admission.    Date of encounter:   Patient:                    MRN:                       YOB: 2023  Yasmany Huerta  [id number]  1960     Home health to see patient for:  Skilled Nursing care for assessment, interventions & education, Wound Care, Physical Therapy evaluation and treatment and Occupational therapy evaluation and treatment    Skilled need for:  New Onset Medical Diagnosis cardic arrest, ICD placement , Medication Management meds, safety, vitals.  and Comment: PT/OT, wound care from ICD placement     Skilled nursing interventions to include:  Wound Care and Comment: PT/OT    Homebound status evidenced by:  Require the use of special transportation or Needs the assistance of another person in order to leave the home. Leaving home requires a considerable and taxing effort. There is a normal inability to leave the home.    Community Physician to provide follow up care: No primary care provider on file.     Optional Interventions? No      I certify the face to face encounter for this home health care referral meets the CMS requirements and the encounter/clinical assessment with the patient was, in whole, or in part, for the medical condition(s) listed above, which is the primary reason for home health care. Based on my clinical findings: the service(s) are medically necessary, support the need for home health care, and the homebound criteria are met.  I certify that this patient has had a face to face encounter by myself.  Jeremias Dawn M.D. - NPI: [id number]

## 2021-06-30 NOTE — CARE PLAN
The patient is Watcher - Medium risk of patient condition declining or worsening    Shift Goals  Clinical Goals: mobilization, safety  Patient Goals: sleep comfortably   Family Goals: n/a    Progress made toward(s) clinical / shift goals:  Administered meds as ordered, assessed pt pain, encouraged pt to ambulate.       Problem: Pain - Standard  Goal: Alleviation of pain or a reduction in pain to the patient’s comfort goal  Outcome: Progressing  Patient educated to pain scale system. Patient encouraged to verbalize discomfort. Patient taught about non-pharmacological pain management. Patient is comfortable at this time without statements of discomfort or pain.       Problem: Knowledge Deficit - Standard  Goal: Patient and family/care givers will demonstrate understanding of plan of care, disease process/condition, diagnostic tests and medications  Outcome: Progressing  Patient is educated of disease process and condition. Treatment team has included patient in plan of care. All medications indications and side effects are explained. Patient is encouraged to ask questions. Patient indicates understanding.

## 2021-06-30 NOTE — DISCHARGE PLANNING
Anticipated Discharge Disposition: Home with HH    Action: Chart review completed; pt pending ICD placement. Per PT OT; pt's mobility has progressed and can be discharged with HH. CM will verify PCP status and obtain HH choice if pt agrees with plan.     Barriers to Discharge:   Medical clearance  ICD placement today  HH referral  PCP verification for HH     Plan: Care coordination will follow up with pt for discharge needs/HH/PCP.

## 2021-07-01 ENCOUNTER — APPOINTMENT (OUTPATIENT)
Dept: RADIOLOGY | Facility: MEDICAL CENTER | Age: 61
DRG: 224 | End: 2021-07-01
Attending: INTERNAL MEDICINE
Payer: MEDICAID

## 2021-07-01 VITALS
BODY MASS INDEX: 33.05 KG/M2 | WEIGHT: 230.82 LBS | TEMPERATURE: 96.9 F | HEART RATE: 77 BPM | RESPIRATION RATE: 18 BRPM | DIASTOLIC BLOOD PRESSURE: 54 MMHG | HEIGHT: 70 IN | OXYGEN SATURATION: 96 % | SYSTOLIC BLOOD PRESSURE: 97 MMHG

## 2021-07-01 PROBLEM — E83.39 HYPOPHOSPHATEMIA: Status: RESOLVED | Noted: 2021-06-23 | Resolved: 2021-07-01

## 2021-07-01 PROBLEM — E87.20 METABOLIC ACIDOSIS: Status: RESOLVED | Noted: 2021-06-21 | Resolved: 2021-07-01

## 2021-07-01 PROBLEM — D72.823 LEUKEMOID REACTION: Status: RESOLVED | Noted: 2021-06-21 | Resolved: 2021-07-01

## 2021-07-01 PROBLEM — F10.929 ALCOHOL INTOXICATION (HCC): Status: RESOLVED | Noted: 2021-06-21 | Resolved: 2021-07-01

## 2021-07-01 PROBLEM — E83.42 HYPOMAGNESEMIA: Status: RESOLVED | Noted: 2021-06-21 | Resolved: 2021-07-01

## 2021-07-01 PROBLEM — E87.0 HYPERNATREMIA: Status: RESOLVED | Noted: 2021-06-21 | Resolved: 2021-07-01

## 2021-07-01 PROBLEM — J96.01 ACUTE RESPIRATORY FAILURE WITH HYPOXIA (HCC): Status: RESOLVED | Noted: 2021-06-21 | Resolved: 2021-07-01

## 2021-07-01 PROBLEM — N17.9 AKI (ACUTE KIDNEY INJURY) (HCC): Status: RESOLVED | Noted: 2021-06-21 | Resolved: 2021-07-01

## 2021-07-01 PROBLEM — G93.40 ACUTE ENCEPHALOPATHY: Status: RESOLVED | Noted: 2021-06-21 | Resolved: 2021-07-01

## 2021-07-01 LAB
ALBUMIN SERPL BCP-MCNC: 3.8 G/DL (ref 3.2–4.9)
ALBUMIN/GLOB SERPL: 1.1 G/DL
ALP SERPL-CCNC: 123 U/L (ref 30–99)
ALT SERPL-CCNC: 14 U/L (ref 2–50)
ANION GAP SERPL CALC-SCNC: 13 MMOL/L (ref 7–16)
AST SERPL-CCNC: 18 U/L (ref 12–45)
BASOPHILS # BLD AUTO: 0.3 % (ref 0–1.8)
BASOPHILS # BLD: 0.03 K/UL (ref 0–0.12)
BILIRUB SERPL-MCNC: 0.5 MG/DL (ref 0.1–1.5)
BUN SERPL-MCNC: 13 MG/DL (ref 8–22)
CALCIUM SERPL-MCNC: 9.5 MG/DL (ref 8.5–10.5)
CHLORIDE SERPL-SCNC: 101 MMOL/L (ref 96–112)
CO2 SERPL-SCNC: 23 MMOL/L (ref 20–33)
CREAT SERPL-MCNC: 0.78 MG/DL (ref 0.5–1.4)
EKG IMPRESSION: NORMAL
EOSINOPHIL # BLD AUTO: 0.25 K/UL (ref 0–0.51)
EOSINOPHIL NFR BLD: 2.1 % (ref 0–6.9)
ERYTHROCYTE [DISTWIDTH] IN BLOOD BY AUTOMATED COUNT: 45.1 FL (ref 35.9–50)
GLOBULIN SER CALC-MCNC: 3.4 G/DL (ref 1.9–3.5)
GLUCOSE BLD-MCNC: 106 MG/DL (ref 65–99)
GLUCOSE BLD-MCNC: 117 MG/DL (ref 65–99)
GLUCOSE SERPL-MCNC: 97 MG/DL (ref 65–99)
HCT VFR BLD AUTO: 42.6 % (ref 42–52)
HGB BLD-MCNC: 14.1 G/DL (ref 14–18)
IMM GRANULOCYTES # BLD AUTO: 0.11 K/UL (ref 0–0.11)
IMM GRANULOCYTES NFR BLD AUTO: 0.9 % (ref 0–0.9)
LYMPHOCYTES # BLD AUTO: 1.14 K/UL (ref 1–4.8)
LYMPHOCYTES NFR BLD: 9.6 % (ref 22–41)
MCH RBC QN AUTO: 31.4 PG (ref 27–33)
MCHC RBC AUTO-ENTMCNC: 33.1 G/DL (ref 33.7–35.3)
MCV RBC AUTO: 94.9 FL (ref 81.4–97.8)
MONOCYTES # BLD AUTO: 1.1 K/UL (ref 0–0.85)
MONOCYTES NFR BLD AUTO: 9.3 % (ref 0–13.4)
NEUTROPHILS # BLD AUTO: 9.22 K/UL (ref 1.82–7.42)
NEUTROPHILS NFR BLD: 77.8 % (ref 44–72)
NRBC # BLD AUTO: 0 K/UL
NRBC BLD-RTO: 0 /100 WBC
PLATELET # BLD AUTO: 358 K/UL (ref 164–446)
PMV BLD AUTO: 9.4 FL (ref 9–12.9)
POTASSIUM SERPL-SCNC: 3.6 MMOL/L (ref 3.6–5.5)
PROT SERPL-MCNC: 7.2 G/DL (ref 6–8.2)
RBC # BLD AUTO: 4.49 M/UL (ref 4.7–6.1)
SODIUM SERPL-SCNC: 137 MMOL/L (ref 135–145)
WBC # BLD AUTO: 11.9 K/UL (ref 4.8–10.8)

## 2021-07-01 PROCEDURE — 93005 ELECTROCARDIOGRAM TRACING: CPT | Performed by: INTERNAL MEDICINE

## 2021-07-01 PROCEDURE — 700102 HCHG RX REV CODE 250 W/ 637 OVERRIDE(OP): Performed by: NURSE PRACTITIONER

## 2021-07-01 PROCEDURE — 99231 SBSQ HOSP IP/OBS SF/LOW 25: CPT | Mod: 24 | Performed by: INTERNAL MEDICINE

## 2021-07-01 PROCEDURE — 71045 X-RAY EXAM CHEST 1 VIEW: CPT

## 2021-07-01 PROCEDURE — 700102 HCHG RX REV CODE 250 W/ 637 OVERRIDE(OP): Performed by: HOSPITALIST

## 2021-07-01 PROCEDURE — A9270 NON-COVERED ITEM OR SERVICE: HCPCS | Performed by: PHYSICIAN ASSISTANT

## 2021-07-01 PROCEDURE — 85025 COMPLETE CBC W/AUTO DIFF WBC: CPT

## 2021-07-01 PROCEDURE — 36415 COLL VENOUS BLD VENIPUNCTURE: CPT

## 2021-07-01 PROCEDURE — 93010 ELECTROCARDIOGRAM REPORT: CPT | Performed by: INTERNAL MEDICINE

## 2021-07-01 PROCEDURE — A9270 NON-COVERED ITEM OR SERVICE: HCPCS | Performed by: NURSE PRACTITIONER

## 2021-07-01 PROCEDURE — 700102 HCHG RX REV CODE 250 W/ 637 OVERRIDE(OP): Performed by: PHYSICIAN ASSISTANT

## 2021-07-01 PROCEDURE — 99239 HOSP IP/OBS DSCHRG MGMT >30: CPT | Performed by: INTERNAL MEDICINE

## 2021-07-01 PROCEDURE — 80053 COMPREHEN METABOLIC PANEL: CPT

## 2021-07-01 PROCEDURE — 700111 HCHG RX REV CODE 636 W/ 250 OVERRIDE (IP): Performed by: INTERNAL MEDICINE

## 2021-07-01 PROCEDURE — A9270 NON-COVERED ITEM OR SERVICE: HCPCS | Performed by: HOSPITALIST

## 2021-07-01 PROCEDURE — 82962 GLUCOSE BLOOD TEST: CPT | Mod: 91

## 2021-07-01 RX ORDER — METOPROLOL SUCCINATE 100 MG/1
100 TABLET, EXTENDED RELEASE ORAL DAILY
Qty: 30 TABLET | Refills: 11 | Status: SHIPPED | OUTPATIENT
Start: 2021-07-02 | End: 2022-04-08 | Stop reason: SDUPTHER

## 2021-07-01 RX ORDER — LOSARTAN POTASSIUM 50 MG/1
50 TABLET ORAL DAILY
Qty: 30 TABLET | Refills: 11 | Status: SHIPPED | OUTPATIENT
Start: 2021-07-02 | End: 2022-01-26 | Stop reason: SDUPTHER

## 2021-07-01 RX ORDER — ATORVASTATIN CALCIUM 80 MG/1
80 TABLET, FILM COATED ORAL EVERY EVENING
Qty: 30 TABLET | Refills: 11 | Status: SHIPPED | OUTPATIENT
Start: 2021-07-01 | End: 2022-04-08 | Stop reason: SDUPTHER

## 2021-07-01 RX ORDER — SPIRONOLACTONE 25 MG/1
25 TABLET ORAL DAILY
Qty: 30 TABLET | Refills: 11 | Status: SHIPPED | OUTPATIENT
Start: 2021-07-02 | End: 2022-04-08 | Stop reason: SDUPTHER

## 2021-07-01 RX ORDER — ASPIRIN 81 MG/1
81 TABLET ORAL DAILY
Qty: 30 TABLET | Refills: 11 | Status: SHIPPED | OUTPATIENT
Start: 2021-07-02 | End: 2022-07-22

## 2021-07-01 RX ORDER — CLOPIDOGREL BISULFATE 75 MG/1
75 TABLET ORAL DAILY
Qty: 30 TABLET | Refills: 11 | Status: SHIPPED | OUTPATIENT
Start: 2021-07-02 | End: 2022-04-08

## 2021-07-01 RX ADMIN — ACETAMINOPHEN 650 MG: 325 TABLET, FILM COATED ORAL at 00:02

## 2021-07-01 RX ADMIN — SPIRONOLACTONE 25 MG: 25 TABLET ORAL at 06:24

## 2021-07-01 RX ADMIN — METOPROLOL SUCCINATE 100 MG: 50 TABLET, EXTENDED RELEASE ORAL at 06:24

## 2021-07-01 RX ADMIN — ASPIRIN 81 MG: 81 TABLET, COATED ORAL at 06:24

## 2021-07-01 RX ADMIN — CLOPIDOGREL BISULFATE 75 MG: 75 TABLET ORAL at 06:24

## 2021-07-01 RX ADMIN — LOSARTAN POTASSIUM 50 MG: 50 TABLET, FILM COATED ORAL at 06:24

## 2021-07-01 RX ADMIN — CEFAZOLIN SODIUM 2 G: 2 INJECTION, SOLUTION INTRAVENOUS at 00:02

## 2021-07-01 ASSESSMENT — ENCOUNTER SYMPTOMS
APNEA: 0
COUGH: 0
WHEEZING: 0
CHEST TIGHTNESS: 0
CHOKING: 0
SHORTNESS OF BREATH: 0
STRIDOR: 0

## 2021-07-01 ASSESSMENT — PAIN DESCRIPTION - PAIN TYPE
TYPE: ACUTE PAIN
TYPE: ACUTE PAIN

## 2021-07-01 NOTE — DISCHARGE INSTRUCTIONS
Discharge Instructions    Discharged to home by car with friend. Discharged via wheelchair, hospital escort: Yes.  Special equipment needed: Not Applicable    Be sure to schedule a follow-up appointment with your primary care doctor or any specialists as instructed.     Discharge Plan:   Diet Plan: Discussed  Activity Level: Discussed  Confirmed Follow up Appointment: Appointment Scheduled  Confirmed Symptoms Management: Discussed  Medication Reconciliation Updated: Yes    I understand that a diet low in cholesterol, fat, and sodium is recommended for good health. Unless I have been given specific instructions below for another diet, I accept this instruction as my diet prescription.   Other diet: Cardiac    Special Instructions: None     · Is patient discharged on Warfarin / Coumadin?   No     Depression / Suicide Risk    As you are discharged from this [facility] facility, it is important to learn how to keep safe from harming yourself.    Recognize the warning signs:  · Abrupt changes in personality, positive or negative- including increase in energy   · Giving away possessions  · Change in eating patterns- significant weight changes-  positive or negative  · Change in sleeping patterns- unable to sleep or sleeping all the time   · Unwillingness or inability to communicate  · Depression  · Unusual sadness, discouragement and loneliness  · Talk of wanting to die  · Neglect of personal appearance   · Rebelliousness- reckless behavior  · Withdrawal from people/activities they love  · Confusion- inability to concentrate     If you or a loved one observes any of these behaviors or has concerns about self-harm, here's what you can do:  · Talk about it- your feelings and reasons for harming yourself  · Remove any means that you might use to hurt yourself (examples: pills, rope, extension cords, firearm)  · Get professional help from the community (Mental Health, Substance Abuse, psychological counseling)  · Do not be  alone:Call your Safe Contact- someone whom you trust who will be there for you.  · Call your local CRISIS HOTLINE 004-2678 or 096-970-7707  · Call your local Children's Mobile Crisis Response Team [place] (038) 407-5463 or www.[employer]  · Call the toll free National Suicide Prevention Hotlines   · National Suicide Prevention Lifeline 123-411-SOWC (7187)  · [place] [employer] [facility] 800-SUICIDE (139-3420)      Cardioverter Defibrillator Implantation    An implantable cardioverter defibrillator (ICD) is a small device that is placed under the skin in the chest or abdomen. An ICD consists of a battery, a small computer (pulse generator), and wires (leads) that go into the heart.  An ICD is used to detect and correct two types of dangerous irregular heartbeats (arrhythmias):  · A rapid heart rhythm (tachycardia).  · An arrhythmia in which the lower chambers of the heart (ventricles) contract in an uncoordinated way (fibrillation).  When an ICD detects tachycardia, it sends a low-energy shock to the heart to restore the heartbeat to normal (cardioversion). This signal is usually painless. If cardioversion does not work or if the ICD detects fibrillation, it delivers a high-energy shock to the heart (defibrillation) to restart the heart. This shock may feel like a strong jolt in the chest.  Your health care provider may prescribe an ICD if:  · You have had an arrhythmia that originated in the ventricles.  · Your heart has been damaged by a disease or heart condition.  Sometimes, ICDs are programmed to act as a device called a pacemaker. Pacemakers can be used to treat a slow heartbeat (bradycardia) or tachycardia by taking over the heart rate with electrical impulses.  Tell a health care provider about:  · Any allergies you have.  · All medicines you are taking, including vitamins, herbs, eye drops, creams, and over-the-counter medicines.  · Any problems you or family members have had with anesthetic  medicines.  · Any blood disorders you have.  · Any surgeries you have had.  · Any medical conditions you have.  · Whether you are pregnant or may be pregnant.  What are the risks?  Generally, this is a safe procedure. However, problems may occur, including:  · Swelling, bleeding, or bruising.  · Infection.  · Blood clots.  · Damage to other structures or organs, such as nerves, blood vessels, or the heart.  · Allergic reactions to medicines used during the procedure.  What happens before the procedure?  Staying hydrated  Follow instructions from your health care provider about hydration, which may include:  · Up to 2 hours before the procedure - you may continue to drink clear liquids, such as water, clear fruit juice, black coffee, and plain tea.  Eating and drinking restrictions  Follow instructions from your health care provider about eating and drinking, which may include:  · 8 hours before the procedure - stop eating heavy meals or foods such as meat, fried foods, or fatty foods.  · 6 hours before the procedure - stop eating light meals or foods, such as toast or cereal.  · 6 hours before the procedure - stop drinking milk or drinks that contain milk.  · 2 hours before the procedure - stop drinking clear liquids.  Medicine  Ask your health care provider about:  · Changing or stopping your normal medicines. This is important if you take diabetes medicines or blood thinners.  · Taking medicines such as aspirin and ibuprofen. These medicines can thin your blood. Do not take these medicines before your procedure if your doctor tells you not to.  Tests  · You may have blood tests.  · You may have a test to check the electrical signals in your heart (electrocardiogram, ECG).  · You may have imaging tests, such as a chest X-ray.  General instructions  · For 24 hours before the procedure, stop using products that contain nicotine or tobacco, such as cigarettes and e-cigarettes. If you need help quitting, ask your  health care provider.  · Plan to have someone take you home from the hospital or clinic.  · You may be asked to shower with a germ-killing soap.  What happens during the procedure?  · To reduce your risk of infection:  ? Your health care team will wash or sanitize their hands.  ? Your skin will be washed with soap.  ? Hair may be removed from the surgical area.  · Small monitors will be put on your body. They will be used to check your heart, blood pressure, and oxygen level.  · An IV tube will be inserted into one of your veins.  · You will be given one or more of the following:  ? A medicine to help you relax (sedative).  ? A medicine to numb the area (local anesthetic).  ? A medicine to make you fall asleep (general anesthetic).  · Leads will be guided through a blood vessel into your heart and attached to your heart muscles. Depending on the ICD, the leads may go into one ventricle or they may go into both ventricles and into an upper chamber of the heart. An X-ray machine (fluoroscope) will be usedto help guide the leads.  · A small incision will be made to create a deep pocket under your skin.  · The pulse generator will be placed into the pocket.  · The ICD will be tested.  · The incision will be closed with stitches (sutures), skin glue, or staples.  · A bandage (dressing) will be placed over the incision.  This procedure may vary among health care providers and hospitals.  What happens after the procedure?  · Your blood pressure, heart rate, breathing rate, and blood oxygen level will be monitored often until the medicines you were given have worn off.  · A chest X-ray will be taken to check that the ICD is in the right place.  · You will need to stay in the hospital for 1-2 days so your health care provider can make sure your ICD is working.  · Do not drive for 24 hours if you received a sedative. Ask your health care provider when it is safe for you to drive.  · You may be given an identification card  explaining that you have an ICD.  Summary  · An implantable cardioverter defibrillator (ICD) is a small device that is placed under the skin in the chest or abdomen. It is used to detect and correct dangerous irregular heartbeats (arrhythmias).  · An ICD consists of a battery, a small computer (pulse generator), and wires (leads) that go into the heart.  · When an ICD detects rapid heart rhythm (tachycardia), it sends a low-energy shock to the heart to restore the heartbeat to normal (cardioversion). If cardioversion does not work or if the ICD detects uncoordinated heart contractions (fibrillation), it delivers a high-energy shock to the heart (defibrillation) to restart the heart.  · You will need to stay in the hospital for 1-2 days to make sure your ICD is working.  This information is not intended to replace advice given to you by your health care provider. Make sure you discuss any questions you have with your health care provider.  Document Released: 09/09/2003 Document Revised: 11/30/2018 Document Reviewed: 12/27/2017  [employer] Patient Education © 2020 [employer]        CPR, Adult  Cardiopulmonary resuscitation (CPR) is an emergency procedure that is done to help a person whose breathing or heartbeat has stopped (cardiac arrest). When the heart stops beating, blood flow to the brain and other vital organs also stops. Brain damage or death can occur if this is not treated within minutes. CPR squeezes the heart and moves blood and oxygen to the brain and lungs. CPR can save a life.  Adult CPR guidelines apply to any person who has reached puberty. Signs of puberty in males include facial or underarm hair. A sign of puberty in females is breast development. Adult CPR is based on the C-A-B sequence:  · C - Chest compressions.  · A - Airway.  · B - Breathing.  If an automated external defibrillator (AED) is available, use it during CPR. An AED is a portable electrical device that can deliver an electric shock  (if necessary) to restart the heart or to return the heartbeat to normal. This process is called defibrillation. If an AED becomes available at any time, use it immediately.  The best way to learn CPR is to take a certified training class. Look for a class in your community. Almost anyone can learn to do CPR.  What should I do first?  If you see a person who seems to be unconscious and is not breathing, or is only gasping, take the following steps:  1. Make sure the area is safe    · Quickly look around the area where the person is located. Go to help the person only if the area seems safe to enter.  · If the person is in immediate danger, try to carefully move the person away from the area.  2. Check for a response    · Firmly tap the person on a shoulder and loudly ask if he or she is okay.  · Watch the person's face and chest for 5-10 seconds to see if he or she is breathing.  · If you are by yourself, shout for help to see if someone is nearby.  3. Call emergency services  · If the person does not respond, and he or she is not breathing or is only gasping, call emergency services (911 in the [place] and then start CPR. If calling from a cell phone, use the speakerphone function to keep your hands free.  ? If another person is nearby, ask that person to call emergency services (911 in the [place] and look for an AED while you start CPR.  · If the person responds and is breathing normally but is ill or injured, call emergency services and wait for help. Follow the 's instructions over the phone. While you wait, check the person frequently.  ? If another person is nearby, have that person call emergency services and look for an AED while you wait with the ill or injured person.  4. Begin CPR  · Start chest compressions immediately if the person does not respond to you, and:  ? The person is not breathing.  ? You are not sure if the person is breathing.  ? The person is gasping (moving his or her mouth to  "breathe but there is no chest movement).  · Remember the C-A-B sequence: chest compressions, airway, and breathing.  · If you have an AED, use it right away by turning it on and following its directions.  How do I perform CPR?  Position the person  The person should be lying on a firm, flat surface, facing up. You may need to carefully roll the person into this position.  C - Chest compressions  To perform chest compressions, take the following steps:  1. Kneel next to the person's chest.  2. Place the heel of one of your hands in the middle of the person's chest, between the nipples.  3. Place the heel of your other hand on top of the first hand so that your hands overlap, or use just one hand if the person is small.  4. Push down until the chest moves down at least 2 inches (5 cm), or one-third the depth of the chest.  5. Let the chest rise up completely to its normal position before the next compression. Do not lean on the chest, especially between compressions.  6. Do compressions very quickly, at a rate of 100-120 per minute. Count the compressions while you do them. Focus on doing compressions \"hard and fast.\"  Do compressions at a consistent rhythm, with no interruptions, until the  provides other instructions. If you have an AED, follow its instructions.  A - Airway    Opening the airway prepares the person to receive rescue breaths. You should only open the airway if you are trained in compression and airway CPR. Be careful when moving the person's head and neck, especially if you think the person is injured.  Remember, \"head tilt, chin lift\":  1. Place one hand on the person's forehead and push slightly with your palm to tilt the head back.  2. Place the fingers of your other hand under the bony part of the person's lower jaw and gently lift the chin.  3. Pinch the person's nose closed.    B - Breathing  If you are trained in compression and airway CPR, give the person rescue breaths by taking " "the following steps:  1. Put your mouth over the person's mouth. Use a CPR barrier if you have one available. Make a good seal with your mouth so that all of the air that you exhale goes into the person's mouth.  2. Exhale 2 breaths into the person's mouth. Each breath should take about 1 second.  3. Make sure the person's chest rises when you exhale. If the chest does not rise, reposition the head and try again.  If you are by yourself, open the airway and give 2 rescue breaths after every 30 chest compressions. If you cannot give rescue breaths, you may do chest compressions only (compression-only CPR or hands-only CPR).  Perform defibrillation with an AED  Use an AED as soon as one becomes available. If there are two people performing CPR, one person should continue CPR while the second person prepares to use the AED. To use the AED, take the following steps:  1. Turn on the AED and follow its directions. The AED will show where to attach the pads to the person's chest.  2. The AED will automatically determine whether you need to give the person a shock. The AED will provide directions on how to do this. If a shock is needed:  ? Make sure that no one is touching the person before you give the shock. Right before the shock, loudly say, \"clear\" and look to be sure that no one is touching the person.  ? The AED will deliver a shock; follow the steps that it provides. You may have to push the \"shock\" button on the machine.  ? After one shock is delivered, continue to perform CPR. The AED will instruct you when to give another shock or when to check the person's heart rhythm.  ? Continue CPR and defibrillation until the person starts breathing normally or until medical personnel take over.  3. If it is not possible or necessary to deliver a shock, continue CPR until medical help arrives.  Should I wait to perform CPR until a trained professional is available?  · Do not wait until medical professionals arrive. You have " a better chance of saving a life if you attempt CPR while waiting for medical help to arrive.  · When trained medical professionals arrive, tell them what happened. This is an important part of the overall care provided to the person.  Where to find more information  To find a CPR course near you, visit the website of the American [employer] Association: www.heart.org  Summary  · CPR can save a life by moving blood and oxygen to the brain and lungs.  · The best way to learn CPR is to take a training class. Look for a class in your community. Almost anyone can learn to do CPR.  · Do CPR using the C-A-B method. This stands for chest compressions, airway, and breathing.  · If an AED becomes available at any time, use it immediately. An AED delivers an electric shock to try and restart the heart or return the heartbeat to normal.  This information is not intended to replace advice given to you by your health care provider. Make sure you discuss any questions you have with your health care provider.  Document Released: 10/14/2008 Document Revised: 09/17/2019 Document Reviewed: 09/17/2019  [employer] Patient Education © 2020 [employer]

## 2021-07-01 NOTE — DISCHARGE SUMMARY
Discharge Summary    CHIEF COMPLAINT ON ADMISSION  Chief Complaint   Patient presents with   • Cardiac Arrest     PT transfered from [place] post arrest in local [place].  PT was shocked x 1 by AED and transported to ED with pulses.       Reason for Admission  EMS     Admission Date  6/21/2021    CODE STATUS  Full Code    HPI & HOSPITAL COURSE  This is a 60-year-old male that was brought to an outlying facility after out of hospital arrest and after initial stabilization transferred to [facility] for further definitive intervention.  Reportedly the patient was found down with an unknown amount of downtime, there was bystander CPR and AED was attached with discharge x1.  EMS arrived and placed a airway and started transport.  The patient had additional several rounds of CPR in transit and then had return of neurologic function.  The patient was combative but was able to tell the paramedics his name.  He then was intubated in the emergency room at the [facility] and brought to [facility].  The patient was given heparin and aspirin initially.  The patient reportedly had a history of stroke with left-sided deficits, after initial presentation he improved. Also memory and congitive function improved significantly. The patient went for left heart catheterization that was found to have multivessel significant coronary disease.  Cardiothoracic surgery recommends to not proceed with open heart surgery but alternative treatment.  High risk PCI done 6/28- cardiology following. ICD placed 6/30/2021 successfully. He was monitored overnight. Medications titrated per cardiology recommendations. PT/OT did recommend home health but that was not able to be arranged due to pt insurance. Outpatient orders placed. Pt believes that he can get some one to drive him at therapy.     Therefore, he is discharged in guarded and stable condition to home with close outpatient follow-up.    The patient met  2-midnight criteria for an inpatient stay at the time of discharge.    Discharge Date  07/01/2021    FOLLOW UP ITEMS POST DISCHARGE  None     DISCHARGE DIAGNOSES  Principal Problem:    Cardiac arrest (HCC) POA: Yes  Active Problems:    Hyperglycemia POA: Yes    Coronary artery disease involving native coronary artery of native heart POA: Yes    CVA (cerebral vascular accident) (HCC) POA: Yes    Ischemic cardiomyopathy POA: Yes  Resolved Problems:    Acute respiratory failure with hypoxia (HCC) POA: Yes    WEN (acute kidney injury) ([facility]) POA: Yes    Hypernatremia POA: Yes    Metabolic acidosis POA: Yes    Alcohol intoxication (HCC) POA: Yes    Hypomagnesemia POA: Yes    Acute encephalopathy POA: Yes    Leukemoid reaction POA: Yes    Hypophosphatemia POA: Yes      FOLLOW UP  Future Appointments   Date Time Provider Department Center   7/7/2021  1:45 PM CHRISTINE Baker None   8/11/2021  3:00 PM CHRISTINE Baker None     [facility]  [address]  [address]  [facility] [place] [id number] [phone]  Call  Your doctor has referred you to Cardiac Rehab, which is important to your recovery. Please call to make an appointment.      MEDICATIONS ON DISCHARGE     Medication List      START taking these medications      Instructions   aspirin 81 MG EC tablet  Start taking on: July 2, 2021   Take 1 tablet by mouth every day.  Dose: 81 mg     atorvastatin 80 MG tablet  Commonly known as: LIPITOR   Take 1 tablet by mouth every evening.  Dose: 80 mg     clopidogrel 75 MG Tabs  Start taking on: July 2, 2021  Commonly known as: PLAVIX   Take 1 tablet by mouth every day.  Dose: 75 mg     losartan 50 MG Tabs  Start taking on: July 2, 2021  Commonly known as: COZAAR   Take 1 tablet by mouth every day.  Dose: 50 mg     metoprolol  MG Tb24  Start taking on: July 2, 2021  Commonly known as: TOPROL XL   Take 1 tablet by mouth every day.  Dose: 100 mg     spironolactone 25 MG Tabs  Start taking on:  July 2, 2021  Commonly known as: ALDACTONE   Take 1 tablet by mouth every day.  Dose: 25 mg            Allergies  No Known Allergies    DIET  Orders Placed This Encounter   Procedures   • Diet Order Diet: Cardiac     Standing Status:   Standing     Number of Occurrences:   1     Order Specific Question:   Diet:     Answer:   Cardiac [6]       ACTIVITY  As tolerated.  Weight bearing as tolerated    CONSULTATIONS  Cardiology     PROCEDURES  PCI  ICD placement     LABORATORY  Lab Results   Component Value Date    SODIUM 137 07/01/2021    POTASSIUM 3.6 07/01/2021    CHLORIDE 101 07/01/2021    CO2 23 07/01/2021    GLUCOSE 97 07/01/2021    BUN 13 07/01/2021    CREATININE 0.78 07/01/2021        Lab Results   Component Value Date    WBC 11.9 (H) 07/01/2021    HEMOGLOBIN 14.1 07/01/2021    HEMATOCRIT 42.6 07/01/2021    PLATELETCT 358 07/01/2021        Total time of the discharge process exceeds 35 minutes.

## 2021-07-01 NOTE — CARE PLAN
Problem: Knowledge Deficit - Standard  Goal: Patient and family/care givers will demonstrate understanding of plan of care, disease process/condition, diagnostic tests and medications  Outcome: Progressing     Problem: Hemodynamics  Goal: Patient's hemodynamics, fluid balance and neurologic status will be stable or improve  Outcome: Progressing     Problem: Fluid Volume  Goal: Fluid volume balance will be maintained  Outcome: Progressing     Problem: Urinary - Renal Perfusion  Goal: Ability to achieve and maintain adequate renal perfusion and functioning will improve  Outcome: Progressing     Problem: Respiratory  Goal: Patient will achieve/maintain optimum respiratory ventilation and gas exchange  Outcome: Progressing     Problem: Skin Integrity  Goal: Skin integrity is maintained or improved  Outcome: Progressing     Problem: Pain - Standard  Goal: Alleviation of pain or a reduction in pain to the patient’s comfort goal  Outcome: Progressing     Problem: Fall Risk  Goal: Patient will remain free from falls  Outcome: Progressing       Shift Goals  Clinical Goals: safety  Patient Goals: comfort, safety  Family Goals: SHERI    Progress made toward(s) clinical / shift goals:  Patient has fall precautions in place. Bed in low and locked position. Call light within reach. Patient verbalizes understanding to call for assistance if needed.    Patient is not progressing towards the following goals:none

## 2021-07-01 NOTE — PROGRESS NOTES
Cardiology Follow Up Progress Note    Date of Service  7/1/2021    Attending Physician  Vu Butler M.D.    Chief Complaint     Cardiac arrest     HPI  Yasmany Huerta is a 60 y.o. male with a PMH significant for CVA 7 years ago (left-sided hemiparesis) suffered a witnessed cardiac arrest at the [facility], underwent bystander CPR, AED was placed, shocked x1, in ER underwent additional rounds of ACLS with regaining ROSC.    Interim Events    No overnight cardiac events  Telemetry SR  No recurrent chest pain  /70  Labs reviewed  Na, K, Cr stable  Underwent successful AICD implantation 6/30/2021, [employer].      Review of Systems  Review of Systems   Respiratory: Negative for apnea, cough, choking, chest tightness, shortness of breath, wheezing and stridor.        Vital signs in last 24 hours  Temp:  [36 °C (96.8 °F)-36.4 °C (97.6 °F)] 36 °C (96.8 °F)  Pulse:  [64-92] 92  Resp:  [16-17] 16  BP: (106-131)/(65-86) 131/76  SpO2:  [92 %-100 %] 98 %    Physical Exam  Physical Exam  Cardiovascular:      Rate and Rhythm: Normal rate and regular rhythm.      Pulses: Normal pulses.   Pulmonary:      Effort: Pulmonary effort is normal.   Abdominal:      General: Abdomen is flat.   Skin:     General: Skin is warm.      Comments: Right radial cath site without evidence of hematoma.    Pocket site uncomplicated, new dressing applied.   Neurological:      General: No focal deficit present.      Mental Status: He is alert.   Psychiatric:         Mood and Affect: Mood normal.         Lab Review  Lab Results   Component Value Date/Time    WBC 11.9 (H) 07/01/2021 02:58 AM    RBC 4.49 (L) 07/01/2021 02:58 AM    HEMOGLOBIN 14.1 07/01/2021 02:58 AM    HEMATOCRIT 42.6 07/01/2021 02:58 AM    MCV 94.9 07/01/2021 02:58 AM    MCH 31.4 07/01/2021 02:58 AM    MCHC 33.1 (L) 07/01/2021 02:58 AM    MPV 9.4 07/01/2021 02:58 AM      Lab Results   Component Value Date/Time    SODIUM 137 07/01/2021 02:58 AM    POTASSIUM 3.6 07/01/2021 02:58  AM    CHLORIDE 101 07/01/2021 02:58 AM    CO2 23 07/01/2021 02:58 AM    GLUCOSE 97 07/01/2021 02:58 AM    BUN 13 07/01/2021 02:58 AM    CREATININE 0.78 07/01/2021 02:58 AM      Lab Results   Component Value Date/Time    ASTSGOT 18 07/01/2021 02:58 AM    ALTSGPT 14 07/01/2021 02:58 AM     Lab Results   Component Value Date/Time    CHOLSTRLTOT 151 06/21/2021 08:15 AM    LDL 87 06/21/2021 08:15 AM    HDL 33 (A) 06/21/2021 08:15 AM    TRIGLYCERIDE 155 (H) 06/21/2021 08:15 AM    TROPONINT 569 (H) 06/22/2021 03:30 AM       No results for input(s): NTPROBNP in the last 72 hours.    Cardiac Imaging and Procedures Review  EKG: Small inferior Q waves    Echocardiogram: 6/21/2021  Moderately reduced left ventricular systolic function. Left ventricular   ejection fraction is visually estimated to be 35-40%. Hypokinesis of   the mid to apical inferior wall, mid to distal septum and apical wall   segments. Grade I diastolic dysfunction.  Normal right ventricular size and systolic function.  Normal pericardium without effusion.        Cardiac Catheterization:   6/22/2021  Multivessel CAD  High-grade ostial LAD stenosis, occluded mid LAD with distal left to left and right-to-left collaterals, high-grade ostial diagonal stenosis, high-grade proximal and mid circumflex stenosis, high-grade mid RCA and ostial posterior descending artery stenosis.      Cardiac cath 6/28/2021  Procedures:  · Insertion of 5/6 FR sheath in the right radial artery  · right and left coronary arteriograms  · Left heart catheterization  · Successful PCI of RCA, left circumflex artery, ramus intermediate      Imaging  Chest X-Ray: Lower lung volume with hypoventilatory change     Stress Test: Not applicable    Assessment/Plan    OOH Cardiac arrest, VT/VF  -s/p successful dual-chamber AICD for secondary prevention, [employer] 6/30/2021.  -Device interrogated this morning, functioning well.  -Chest x-ray 7/1/2021, no evidence of pneumothorax.  -s/p high risk PCI  6/28/2021: PCI/IVAN of RCA, PCI/IVAN to left circumflex artery, PCI/IVAN to RI, LAD is totally occluded with collaterals.  -Continue with DAPT, aspirin Plavix, along with atorvastatin 80, Toprol-, losartan 50.        Ischemic cardiomyopathy, LVEF 35 - 40%  -Continue with GDMT  -Toprol-, spironolactone 25, losartan 50  -Appears compensated.        History of CVA 7 years ago  -Left-sided deficit      Stable for discharge.  Cardiology will sign off.  Follow-up appointment with our device clinic in 7 days for wound check and device interrogation, in addition to general cardiology visit in [place].  Patient is interested in cardiac rehab, will arrange despite living in [place].      ICD restrictions reviewed with patient. Do not raise affected arm above head or behind back for six weeks. May remove arm sling after 24 hrs, please wear if trouble remembering arm limitations and prn at night. No heavy lifting/pushing for six weeks. No driving for first week. No showers first week. Keep dressing on, clean, and dry until sees us in follow up. Wound care reviewed. Instructed to report s/s of infection such as warmth/redness/drainage/swelling at site or fever/chills.  ICD therapy reviewed with patient: 1 shock: if feeling fine can notify cardiology office and be seen for interrogation.  If feeling poorly needs to call 911.  2 Shocks in 24 hours: call 911.  Patient verbalizes understanding.         Thank you for allowing me to participate in the care of this patient.      Please contact me with any questions.    YANELIS Lu.   Cardiologist, [facility]  [phone]

## 2021-07-01 NOTE — PROGRESS NOTES
IV Dc'd. Discharge instructions provided to pt. Pt verbalized understanding. Pt states all questions have been answered. Copy of discharge paperwork provided to pt. Prescription send to his pharmacy. Pt states all personal belongings are in possession. Pacer cards sent with pt. Pt escorted off of the unit via wheelchair without incident. Transportation provided by friend.

## 2021-07-07 ENCOUNTER — NON-PROVIDER VISIT (OUTPATIENT)
Dept: CARDIOLOGY | Facility: PHYSICIAN GROUP | Age: 61
End: 2021-07-07
Payer: MEDICAID

## 2021-07-07 VITALS
OXYGEN SATURATION: 97 % | HEART RATE: 80 BPM | WEIGHT: 224 LBS | DIASTOLIC BLOOD PRESSURE: 70 MMHG | BODY MASS INDEX: 33.95 KG/M2 | HEIGHT: 68 IN | SYSTOLIC BLOOD PRESSURE: 120 MMHG

## 2021-07-07 DIAGNOSIS — I46.9 CARDIAC ARREST (HCC): ICD-10-CM

## 2021-07-07 DIAGNOSIS — I25.5 ISCHEMIC CARDIOMYOPATHY: ICD-10-CM

## 2021-07-07 DIAGNOSIS — I25.10 CORONARY ARTERY DISEASE INVOLVING NATIVE CORONARY ARTERY OF NATIVE HEART WITHOUT ANGINA PECTORIS: ICD-10-CM

## 2021-07-07 DIAGNOSIS — Z95.810 AICD (AUTOMATIC CARDIOVERTER/DEFIBRILLATOR) PRESENT: ICD-10-CM

## 2021-07-07 LAB — EKG IMPRESSION: NORMAL

## 2021-07-07 PROCEDURE — 93283 PRGRMG EVAL IMPLANTABLE DFB: CPT | Performed by: NURSE PRACTITIONER

## 2021-07-07 ASSESSMENT — FIBROSIS 4 INDEX: FIB4 SCORE: 0.83

## 2021-07-07 NOTE — PROGRESS NOTES
Patient is a 60 year old male with history of CVA 7 years ago, with residual left sided deficits.     On 6/21/2021, he had OOH cardiac arrest, treated with bystander CPR and AED shock x 1. He was transferred to [facility]. LHC revealed multivessel disease, but CTS did not recommend CABG, and patient also declined.  He did have high risk PCI (with IVAN x 5) done, and ICD was placed 6/30/2021. Medication therapy was adjusted.    He is here today for device interrogation and wound check.    Device is working normally.  No device therapy.  No mode switching episodes.  Normal sensing and capture of RA and RV leads; stable impedances. Battery longevity is 12.5 years.  No changes are made today.    Incision is healing well, with no redness, swelling or drainage noted. He is reminded about limited ROM of left arm. He is also given wound care instructions, general device information and anticipatory guidance.    FU in 1 months for next AICD check with me.    We will also get him set up to see Dr. Albright to establish care.    Medications are renewed today.

## 2021-08-11 ENCOUNTER — NON-PROVIDER VISIT (OUTPATIENT)
Dept: CARDIOLOGY | Facility: PHYSICIAN GROUP | Age: 61
End: 2021-08-11
Payer: MEDICAID

## 2021-08-11 ENCOUNTER — OFFICE VISIT (OUTPATIENT)
Dept: CARDIOLOGY | Facility: PHYSICIAN GROUP | Age: 61
End: 2021-08-11
Payer: MEDICAID

## 2021-08-11 VITALS
BODY MASS INDEX: 32.74 KG/M2 | OXYGEN SATURATION: 97 % | WEIGHT: 216 LBS | BODY MASS INDEX: 32.74 KG/M2 | HEART RATE: 77 BPM | SYSTOLIC BLOOD PRESSURE: 110 MMHG | SYSTOLIC BLOOD PRESSURE: 110 MMHG | DIASTOLIC BLOOD PRESSURE: 70 MMHG | HEIGHT: 68 IN | HEIGHT: 68 IN | WEIGHT: 216 LBS | HEART RATE: 77 BPM | DIASTOLIC BLOOD PRESSURE: 70 MMHG | OXYGEN SATURATION: 97 %

## 2021-08-11 DIAGNOSIS — Z95.810 AICD (AUTOMATIC CARDIOVERTER/DEFIBRILLATOR) PRESENT: ICD-10-CM

## 2021-08-11 DIAGNOSIS — I25.10 CORONARY ARTERY DISEASE INVOLVING NATIVE CORONARY ARTERY OF NATIVE HEART WITHOUT ANGINA PECTORIS: ICD-10-CM

## 2021-08-11 DIAGNOSIS — I46.9 CARDIAC ARREST (HCC): ICD-10-CM

## 2021-08-11 DIAGNOSIS — I25.5 ISCHEMIC CARDIOMYOPATHY: ICD-10-CM

## 2021-08-11 DIAGNOSIS — I63.9 CEREBROVASCULAR ACCIDENT (CVA), UNSPECIFIED MECHANISM (HCC): ICD-10-CM

## 2021-08-11 DIAGNOSIS — E78.2 MIXED HYPERLIPIDEMIA: ICD-10-CM

## 2021-08-11 PROBLEM — E78.5 HYPERLIPIDEMIA: Status: ACTIVE | Noted: 2021-08-11

## 2021-08-11 PROCEDURE — 99214 OFFICE O/P EST MOD 30 MIN: CPT | Mod: 25 | Performed by: NURSE PRACTITIONER

## 2021-08-11 PROCEDURE — 93283 PRGRMG EVAL IMPLANTABLE DFB: CPT | Performed by: NURSE PRACTITIONER

## 2021-08-11 ASSESSMENT — ENCOUNTER SYMPTOMS
BRUISES/BLEEDS EASILY: 0
CHILLS: 0
LOSS OF CONSCIOUSNESS: 0
DIZZINESS: 0
WEAKNESS: 1
PALPITATIONS: 0
SHORTNESS OF BREATH: 0
ABDOMINAL PAIN: 0
ORTHOPNEA: 0
FEVER: 0
PND: 0
NAUSEA: 0
MYALGIAS: 0
COUGH: 0
HEADACHES: 0

## 2021-08-11 ASSESSMENT — FIBROSIS 4 INDEX
FIB4 SCORE: 0.85
FIB4 SCORE: 0.85

## 2021-08-11 NOTE — PROGRESS NOTES
Chief Complaint   Patient presents with   • Follow-Up   • AICD Check/Dysfunction   • Coronary Artery Disease   • Cardiomyopathy (Ischemic)   • Cardiac Arrest   • Hyperlipidemia   • Hyperglycemia       Subjective:   Yasmany Huerta is a 61 y.o. male who presents today for one month follow-up for AICD check, CAD/ischemic cardiomyopathy, history of CVA and hyperlipidemia.    Patient is a 61 year old male with history of CVA 7 years ago, with residual left sided deficits.      On 6/21/2021, he had OOH cardiac arrest, treated with bystander CPR and AED shock x 1. He was transferred to [facility]. LHC revealed multivessel disease, but CTS did not recommend CABG, and patient also declined.  He did have high risk PCI (with IVAN x 5) done, and ICD was placed 6/30/2021. Medication therapy was adjusted.    He was seen by me on 7/7/2021, and device was working normally. He was given device education and anticipatory guidance.    He is here today for one month follow-up, for final threshold checks. Generally, he is doing well. He has requested a referral to [facility] cardiac rehab, as he is not able to make it up to [place] for rehab. He denies any further chest pain, pressure or discomfort; no device therapy; no palpitations; no shortness of breath, orthopnea or PND; some mild dizziness if he changes positions too quickly, but no syncope or presyncope; no LE edema. BP has been stable. He is compliant with his medications. He does have ongoing left sided hemiparesis from prior CVA 7 years ago.    Past Medical History:   Diagnosis Date   • CAD (coronary artery disease) 06/2021    PCI/IVAN x 5: RCA ([place] 3.5 x 25mm, [place] 3.0 x 18mm), proximal OM ([place] 2.75 x 22mm), proximal LCx ([place] 3.0 x 26mm) and RI ([place] 2.5 x 22mm).   • Cardiac arrest (HCC)    • History of CVA (cerebrovascular accident)    • Hyperglycemia    • Hyperlipidemia    • Ischemic cardiomyopathy 06/2021    Echocardiogram with LVEF 35-40%. Hypokinesis of mid-apical inferior wall,  mid-distal septum, and apical wall.      Past Surgical History:   Procedure Laterality Date   • AICD IMPLANT  06/30/2021    Medtronic Crome [id number] implanted by Dr. Camacho.     History reviewed. No pertinent family history.  Social History     Socioeconomic History   • Marital status: Single     Spouse name: Not on file   • Number of children: Not on file   • Years of education: Not on file   • Highest education level: Not on file   Occupational History   • Not on file   Tobacco Use   • Smoking status: Never Smoker   • Smokeless tobacco: Never Used   Vaping Use   • Vaping Use: Never used   Substance and Sexual Activity   • Alcohol use: Yes   • Drug use: Never   • Sexual activity: Not on file   Other Topics Concern   • Not on file   Social History Narrative   • Not on file     Social Determinants of Health     Financial Resource Strain:    • Difficulty of Paying Living Expenses:    Food Insecurity:    • Worried About Running Out of Food in the Last Year:    • Ran Out of Food in the Last Year:    Transportation Needs:    • Lack of Transportation (Medical):    • Lack of Transportation (Non-Medical):    Physical Activity:    • Days of Exercise per Week:    • Minutes of Exercise per Session:    Stress:    • Feeling of Stress :    Social Connections:    • Frequency of Communication with Friends and Family:    • Frequency of Social Gatherings with Friends and Family:    • Attends [employer] Services:    • Active Member of Clubs or Organizations:    • Attends Club or Organization Meetings:    • Marital Status:    Intimate Partner Violence:    • Fear of Current or Ex-Partner:    • Emotionally Abused:    • Physically Abused:    • Sexually Abused:      No Known Allergies  Outpatient Encounter Medications as of 8/11/2021   Medication Sig Dispense Refill   • aspirin EC 81 MG EC tablet Take 1 tablet by mouth every day. 30 tablet 11   • atorvastatin (LIPITOR) 80 MG tablet Take 1 tablet by mouth every evening. 30 tablet 11   •  "clopidogrel (PLAVIX) 75 MG Tab Take 1 tablet by mouth every day. 30 tablet 11   • losartan (COZAAR) 50 MG Tab Take 1 tablet by mouth every day. 30 tablet 11   • metoprolol SR (TOPROL XL) 100 MG TABLET SR 24 HR Take 1 tablet by mouth every day. 30 tablet 11   • spironolactone (ALDACTONE) 25 MG Tab Take 1 tablet by mouth every day. 30 tablet 11     No facility-administered encounter medications on file as of 8/11/2021.     Review of Systems   Constitutional: Negative for chills and fever.   HENT: Negative for congestion.    Respiratory: Negative for cough and shortness of breath.    Cardiovascular: Negative for chest pain, palpitations, orthopnea, leg swelling and PND.   Gastrointestinal: Negative for abdominal pain and nausea.   Musculoskeletal: Negative for myalgias.   Skin: Negative for rash.   Neurological: Positive for weakness. Negative for dizziness, loss of consciousness and headaches.        Mostly left sided, from CVA 7 years ago.   Endo/Heme/Allergies: Does not bruise/bleed easily.        Objective:   /70 (BP Location: Right arm, Patient Position: Sitting, BP Cuff Size: Adult)   Pulse 77   Ht 1.727 m (5' 8\")   Wt 98 kg (216 lb)   SpO2 97%   BMI 32.84 kg/m²     Physical Exam   Constitutional: He is oriented to person, place, and time. He appears well-developed.   Ambulates with a cane.   HENT:   Head: Normocephalic.   Neck: No JVD present.   Cardiovascular: Normal rate, regular rhythm and normal heart sounds.   AICD in left chest wall, incision is well healed.   Pulmonary/Chest: Effort normal and breath sounds normal. No respiratory distress. He has no wheezes. He has no rales.   Abdominal: Soft. Bowel sounds are normal. He exhibits no distension. There is no abdominal tenderness.   Musculoskeletal:         General: Normal range of motion.      Cervical back: Normal range of motion and neck supple.   Neurological: He is alert and oriented to person, place, and time.   Left sided weakness   Skin: " Skin is warm and dry. No rash noted.     AICD interrogation today reveals normal function. No device therapy and no mode switching episodes.     Procedures of [facility] 6/28/2021:  · Insertion of 5/6 FR sheath in the right radial artery  · right and left coronary arteriograms  · Left heart catheterization  · Successful PCI of RCA, left circumflex artery, ramus intermediate    CONCLUSIONS OF CAROTID US OF 6/25/2021:   Mild stenosis of the right internal carotid (< 50%).    Very mild stenosis of the left internal carotid (<< 50%).     CONCLUSIONS OF ECHOCARDIOGRAM OF 6/21/2021:  Moderately reduced left ventricular systolic function. Left ventricular   ejection fraction is visually estimated to be 35-40%. Hypokinesis of   the mid to apical inferior wall, mid to distal septum and apical wall   segments. Grade I diastolic dysfunction.  Normal right ventricular size and systolic function.  Normal pericardium without effusion.    Lab Results   Component Value Date/Time    CHOLSTRLTOT 151 06/21/2021 08:15 AM    LDL 87 06/21/2021 08:15 AM    HDL 33 (A) 06/21/2021 08:15 AM    TRIGLYCERIDE 155 (H) 06/21/2021 08:15 AM       Lab Results   Component Value Date/Time    SODIUM 137 07/01/2021 02:58 AM    POTASSIUM 3.6 07/01/2021 02:58 AM    CHLORIDE 101 07/01/2021 02:58 AM    CO2 23 07/01/2021 02:58 AM    GLUCOSE 97 07/01/2021 02:58 AM    BUN 13 07/01/2021 02:58 AM    CREATININE 0.78 07/01/2021 02:58 AM     Lab Results   Component Value Date/Time    ALKPHOSPHAT 123 (H) 07/01/2021 02:58 AM    ASTSGOT 18 07/01/2021 02:58 AM    ALTSGPT 14 07/01/2021 02:58 AM    TBILIRUBIN 0.5 07/01/2021 02:58 AM      Lab Results   Component Value Date/Time    WBC 11.9 (H) 07/01/2021 02:58 AM    RBC 4.49 (L) 07/01/2021 02:58 AM    HEMOGLOBIN 14.1 07/01/2021 02:58 AM    HEMATOCRIT 42.6 07/01/2021 02:58 AM    MCV 94.9 07/01/2021 02:58 AM    MCH 31.4 07/01/2021 02:58 AM    MCHC 33.1 (L) 07/01/2021 02:58 AM    MPV 9.4 07/01/2021 02:58 AM        Assessment:     1.  AICD (automatic cardioverter/defibrillator) present     2. Cardiac arrest (HCC)     3. Coronary artery disease involving native coronary artery of native heart without angina pectoris     4. Ischemic cardiomyopathy     5. Cerebrovascular accident (CVA), unspecified mechanism (HCC)     6. Mixed hyperlipidemia         Medical Decision Making:  Today's Assessment / Status / Plan:     1. OOH cardiac arrest, with multivessel disease, CAD/ischemic cardiomyopathy with LVEF 35-40%, now with AICD. The device is working normally, and above changes are made today. HE remains on ASA, Plavix, Toprol XL, Losartan, Aldactone and Lipitor. Continue with same doses. To repeat echo in October 2021 after FU with Dr. Albright. I will also get a referral to [facility] cardiac rehab faxed over.    2. History of CVA 7 years ago, with left sided residual hemiparesis.    3. Hyperlipidemia, treated with high intensity statin, Lipitor 80mg.    4. Hyperglycemia, borderline, to monitor over time.    His device is working normally, and he is doing better. He is referred to cardiac rehab. Keep FU in October 2021 with Dr. Albright. FU with me in 6 months for next AICD check. Same medications for now.

## 2021-08-13 ENCOUNTER — TELEPHONE (OUTPATIENT)
Dept: CARDIOLOGY | Facility: MEDICAL CENTER | Age: 61
End: 2021-08-13

## 2021-08-13 DIAGNOSIS — I25.10 CORONARY ARTERY DISEASE INVOLVING NATIVE CORONARY ARTERY OF NATIVE HEART WITHOUT ANGINA PECTORIS: ICD-10-CM

## 2021-08-13 DIAGNOSIS — I46.9 CARDIAC ARREST (HCC): ICD-10-CM

## 2021-08-13 DIAGNOSIS — Z95.810 AICD (AUTOMATIC CARDIOVERTER/DEFIBRILLATOR) PRESENT: ICD-10-CM

## 2021-08-13 NOTE — TELEPHONE ENCOUNTER
AB    Patient called and stated AB was supposed to do a referral for cardiac rehab and it needs to go to the facility in [facility]. Please advise.      Thank you,    Marisela BAUER

## 2021-08-13 NOTE — TELEPHONE ENCOUNTER
Placed cardiac rehab order per Marguerite's -11-21 OV dictation.  Faxed referral to [facility] cardiac rehab [phone].  Left message for pt to advise.

## 2021-10-21 NOTE — PROGRESS NOTES
Subjective:   Chief Complaint:   Chief Complaint   Patient presents with   • AICD Check/Dysfunction   • Coronary Artery Disease   • Cardiac Arrest       Yasmany Huerta is a 61 y.o. male who returns today for complex heart disease including, cardiac arrest, secondary prevention ICD, CAD with 5 stents, LV dysfunction, chronic HFrEF, remote CVA, type 2 diabetes, hypertension, hyperlipidemia.    I met him in the hospital, admitted 6/21/2021 as transfer from [facility] for cardiac arrest.  Patient was brought into [facility] after [place].  Law enforcement arrived first and placed an AED which recommended a shock.  Patient was shocked once and had 2 rounds of CPR and regained pulses.  Upon arrival to [facility], he was combative but was able to share his name.  He ended up intubated for airway protection and transferred to [facility].  Had left heart catheterization with multivessel disease, turned down for CABG, ended up with 5 drug-eluting stents.  Remains on dual antiplatelet therapy, beta-blocker and statin.    LV dysfunction, EF 35 to 40%.  [place] heart class II dyspnea on exertion, stage C.    Prior CVA about 2014, residual left hemiparesis, uses a cane.    Has difficult to control DM2, with neuropathy.  No medications at present    Prior hypertension, most recently blood pressure on the lower side with all of his medications.    Has hyperlipidemia, on atorvastatin.    No family history of premature coronary artery disease.  Remote tobacco, quit a long time ago.  No history of autoimmune disease such as lupus or rheumatoid arthritis.  No chronic kidney disease.  No ETOH overuse. Former drinker.  No caffeine overuse.  No recreation substance use.  No hx asthma.    Walks slowly with cane, left foot drop.  Not limited by chest pain, pressure or tightness with activity.   No significant dyspnea on exertion, orthopnea or lower extremity  swelling.   No significant palpitations, lightheadedness, or presyncope/syncope.   No new stroke/TIA like symptoms.    Needs a PCP, has Medicaid.     Lives alone in [place].  Lost his wife.  Has cats.    DATA REVIEWED by me:  ECG (my personal interpretation) 7-1-21  Sinus, 71, old inferior and lateral MI    ICD check 8/11/2021   A paced 53, V paced 37, no shocks    Echo 6-21-21  Moderately reduced left ventricular systolic function. Left ventricular   ejection fraction is visually estimated to be 35-40%. Hypokinesis of   the mid to apical inferior wall, mid to distal septum and apical wall   segments. Grade I diastolic dysfunction.  Normal right ventricular size and systolic function.  Normal pericardium without effusion.     No prior study is available for comparison.      [facility] 6-22-21  IMPRESSION:   1.  Multivessel coronary artery disease with high-grade ostial left anterior   descending artery stenosis, occluded mid left anterior descending artery   with distal left-to-left and right-to-left collaterals, high grade ostial diagonal   branch stenosis, high grade proximal and mid circumflex artery stenosis,   high grade mid right coronary artery and ostial posterior descending artery   stenosis.  2.  Reduced left ventricular systolic function with ejection fraction of 45%.  3.  Elevated left ventricular end diastolic pressure.    Most recent labs:       Lab Results   Component Value Date/Time    WBC 11.9 (H) 07/01/2021 02:58 AM    HEMOGLOBIN 14.1 07/01/2021 02:58 AM    HEMATOCRIT 42.6 07/01/2021 02:58 AM    MCV 94.9 07/01/2021 02:58 AM    INR 1.16 (H) 06/30/2021 02:45 AM      Lab Results   Component Value Date/Time    SODIUM 137 07/01/2021 02:58 AM    POTASSIUM 3.6 07/01/2021 02:58 AM    CHLORIDE 101 07/01/2021 02:58 AM    CO2 23 07/01/2021 02:58 AM    GLUCOSE 97 07/01/2021 02:58 AM    BUN 13 07/01/2021 02:58 AM    CREATININE 0.78 07/01/2021 02:58 AM      Lab Results   Component Value Date/Time    ASTSGOT 18  07/01/2021 02:58 AM    ALTSGPT 14 07/01/2021 02:58 AM    ALBUMIN 3.8 07/01/2021 02:58 AM      Lab Results   Component Value Date/Time    CHOLSTRLTOT 151 06/21/2021 08:15 AM    LDL 87 06/21/2021 08:15 AM    HDL 33 (A) 06/21/2021 08:15 AM    TRIGLYCERIDE 155 (H) 06/21/2021 08:15 AM     No results for input(s): NTPROBNP, TROPONINT in the last 72 hours.      Past Medical History:   Diagnosis Date   • CAD (coronary artery disease) 06/2021    PCI/IVAN x 5: RCA ([place] 3.5 x 25mm, [place] 3.0 x 18mm), proximal OM ([place] 2.75 x 22mm), proximal LCx ([place] 3.0 x 26mm) and RI ([place] 2.5 x 22mm).   • Cardiac arrest (HCC)    • History of CVA (cerebrovascular accident)    • Hyperglycemia    • Hyperlipidemia    • Ischemic cardiomyopathy 06/2021    Echocardiogram with LVEF 35-40%. Hypokinesis of mid-apical inferior wall, mid-distal septum, and apical wall.      Past Surgical History:   Procedure Laterality Date   • AICD IMPLANT  06/30/2021    Medtronic Crome [id number] implanted by Dr. Camacho.     History reviewed. No pertinent family history.  Social History     Socioeconomic History   • Marital status: Single     Spouse name: Not on file   • Number of children: Not on file   • Years of education: Not on file   • Highest education level: Not on file   Occupational History   • Not on file   Tobacco Use   • Smoking status: Never Smoker   • Smokeless tobacco: Never Used   Vaping Use   • Vaping Use: Never used   Substance and Sexual Activity   • Alcohol use: Not Currently   • Drug use: Never   • Sexual activity: Not on file   Other Topics Concern   • Not on file   Social History Narrative   • Not on file     Social Determinants of Health     Financial Resource Strain:    • Difficulty of Paying Living Expenses:    Food Insecurity:    • Worried About Running Out of Food in the Last Year:    • Ran Out of Food in the Last Year:    Transportation Needs:    • Lack of Transportation (Medical):    • Lack of Transportation (Non-Medical):    Physical  "Activity:    • Days of Exercise per Week:    • Minutes of Exercise per Session:    Stress:    • Feeling of Stress :    Social Connections:    • Frequency of Communication with Friends and Family:    • Frequency of Social Gatherings with Friends and Family:    • Attends [employer] Services:    • Active Member of Clubs or Organizations:    • Attends Club or Organization Meetings:    • Marital Status:    Intimate Partner Violence:    • Fear of Current or Ex-Partner:    • Emotionally Abused:    • Physically Abused:    • Sexually Abused:      No Known Allergies    Current Outpatient Medications   Medication Sig Dispense Refill   • aspirin EC 81 MG EC tablet Take 1 tablet by mouth every day. 30 tablet 11   • atorvastatin (LIPITOR) 80 MG tablet Take 1 tablet by mouth every evening. 30 tablet 11   • clopidogrel (PLAVIX) 75 MG Tab Take 1 tablet by mouth every day. 30 tablet 11   • losartan (COZAAR) 50 MG Tab Take 1 tablet by mouth every day. 30 tablet 11   • metoprolol SR (TOPROL XL) 100 MG TABLET SR 24 HR Take 1 tablet by mouth every day. 30 tablet 11   • spironolactone (ALDACTONE) 25 MG Tab Take 1 tablet by mouth every day. 30 tablet 11     No current facility-administered medications for this visit.       ROS  All others systems reviewed and negative.     Objective:     /60 (BP Location: Right arm, Patient Position: Sitting, BP Cuff Size: Adult)   Pulse 78   Resp 20   Ht 1.727 m (5' 8\")   Wt 96.6 kg (213 lb)   SpO2 98%  Body mass index is 32.39 kg/m².    General: No acute distress. Well nourished.  HEENT: EOM grossly intact, no scleral icterus, no pharyngeal erythema.   Neck:  No JVD at 90, no bruits, trachea midline  CVS: RRR. Normal S1, S2. No M/R/G. No LE edema.  2+ radial pulses, 2+ PT pulses, ICD pocket in tact  Resp: CTAB. No wheezing or crackles/rhonchi. Normal respiratory effort.  Abdomen: Soft, NT, no kassie hepatomegaly.  MSK/Ext: No clubbing or cyanosis. Uses a cane  Skin: Warm and dry, no " preeti.  Neurological: CN III-XII grossly intact. No focal deficits. Left hemiparesis (UE/LE)  Psych: A&O x 3, appropriate affect, good judgement        Assessment:     1. Coronary artery disease involving native coronary artery of native heart without angina pectoris  Comp Metabolic Panel    Lipid Profile   2. Ischemic cardiomyopathy     3. Cerebrovascular accident (CVA), unspecified mechanism (HCC)  CBC WITHOUT DIFFERENTIAL   4. Mixed hyperlipidemia  Lipid Profile   5. Cardiac arrest (HCC)     6. AICD (automatic cardioverter/defibrillator) present     7. Essential hypertension     8. Type 2 diabetes mellitus with hypercholesterolemia (HCC)  HEMOGLOBIN A1C       Medical Decision Making:  Today's Assessment / Status / Plan:     -Continue secondary prevention measures including aspirin, statin, beta-blocker  -DAPT through June 2022  -Follow-up on labs including cholesterol  -On good medications for reduced EF  -Getting his ICD interrogated  -Refer to PCP, can be a challenge with Medicaid    Written instructions given today:      -Fasting blood work at your convenience    -Call [phone], request a [facility] PCP in [place]      Return in about 3 months (around 1/25/2022).    It is my pleasure to participate in the care of Mr. Huerta.  Please do not hesitate to contact me with questions or concerns.    Renae Albright MD, [facility]  Cardiologist [facility]    Please note that this dictation was created using voice recognition software. I have made every reasonable attempt to correct obvious errors, but it is possible there are errors of grammar and possibly content that I did not discover before finalizing the note.

## 2021-10-25 ENCOUNTER — OFFICE VISIT (OUTPATIENT)
Dept: CARDIOLOGY | Facility: CLINIC | Age: 61
End: 2021-10-25
Payer: MEDICAID

## 2021-10-25 VITALS
RESPIRATION RATE: 20 BRPM | WEIGHT: 213 LBS | HEIGHT: 68 IN | HEART RATE: 78 BPM | DIASTOLIC BLOOD PRESSURE: 60 MMHG | SYSTOLIC BLOOD PRESSURE: 102 MMHG | OXYGEN SATURATION: 98 % | BODY MASS INDEX: 32.28 KG/M2

## 2021-10-25 DIAGNOSIS — I25.10 CORONARY ARTERY DISEASE INVOLVING NATIVE CORONARY ARTERY OF NATIVE HEART WITHOUT ANGINA PECTORIS: ICD-10-CM

## 2021-10-25 DIAGNOSIS — I63.9 CEREBROVASCULAR ACCIDENT (CVA), UNSPECIFIED MECHANISM (HCC): ICD-10-CM

## 2021-10-25 DIAGNOSIS — Z95.810 AICD (AUTOMATIC CARDIOVERTER/DEFIBRILLATOR) PRESENT: ICD-10-CM

## 2021-10-25 DIAGNOSIS — E78.00 TYPE 2 DIABETES MELLITUS WITH HYPERCHOLESTEROLEMIA (HCC): ICD-10-CM

## 2021-10-25 DIAGNOSIS — E11.69 TYPE 2 DIABETES MELLITUS WITH HYPERCHOLESTEROLEMIA (HCC): ICD-10-CM

## 2021-10-25 DIAGNOSIS — I25.5 ISCHEMIC CARDIOMYOPATHY: ICD-10-CM

## 2021-10-25 DIAGNOSIS — I46.9 CARDIAC ARREST (HCC): ICD-10-CM

## 2021-10-25 DIAGNOSIS — E78.2 MIXED HYPERLIPIDEMIA: ICD-10-CM

## 2021-10-25 DIAGNOSIS — I10 ESSENTIAL HYPERTENSION: ICD-10-CM

## 2021-10-25 PROCEDURE — 99214 OFFICE O/P EST MOD 30 MIN: CPT | Performed by: INTERNAL MEDICINE

## 2021-10-25 ASSESSMENT — FIBROSIS 4 INDEX: FIB4 SCORE: 0.85

## 2021-11-17 ENCOUNTER — HOSPITAL ENCOUNTER (OUTPATIENT)
Dept: LAB | Facility: MEDICAL CENTER | Age: 61
End: 2021-11-17
Attending: INTERNAL MEDICINE
Payer: MEDICAID

## 2021-11-17 DIAGNOSIS — I63.9 CEREBROVASCULAR ACCIDENT (CVA), UNSPECIFIED MECHANISM (HCC): ICD-10-CM

## 2021-11-17 DIAGNOSIS — E78.2 MIXED HYPERLIPIDEMIA: ICD-10-CM

## 2021-11-17 DIAGNOSIS — E11.69 TYPE 2 DIABETES MELLITUS WITH HYPERCHOLESTEROLEMIA (HCC): ICD-10-CM

## 2021-11-17 DIAGNOSIS — E78.00 TYPE 2 DIABETES MELLITUS WITH HYPERCHOLESTEROLEMIA (HCC): ICD-10-CM

## 2021-11-17 DIAGNOSIS — I25.10 CORONARY ARTERY DISEASE INVOLVING NATIVE CORONARY ARTERY OF NATIVE HEART WITHOUT ANGINA PECTORIS: ICD-10-CM

## 2021-11-17 LAB
ALBUMIN SERPL BCP-MCNC: 4.5 G/DL (ref 3.2–4.9)
ALBUMIN/GLOB SERPL: 1.6 G/DL
ALP SERPL-CCNC: 83 U/L (ref 30–99)
ALT SERPL-CCNC: 30 U/L (ref 2–50)
ANION GAP SERPL CALC-SCNC: 13 MMOL/L (ref 7–16)
AST SERPL-CCNC: 19 U/L (ref 12–45)
BILIRUB SERPL-MCNC: 0.4 MG/DL (ref 0.1–1.5)
BUN SERPL-MCNC: 22 MG/DL (ref 8–22)
CALCIUM SERPL-MCNC: 9.4 MG/DL (ref 8.5–10.5)
CHLORIDE SERPL-SCNC: 104 MMOL/L (ref 96–112)
CHOLEST SERPL-MCNC: 114 MG/DL (ref 100–199)
CO2 SERPL-SCNC: 23 MMOL/L (ref 20–33)
CREAT SERPL-MCNC: 1 MG/DL (ref 0.5–1.4)
ERYTHROCYTE [DISTWIDTH] IN BLOOD BY AUTOMATED COUNT: 45.1 FL (ref 35.9–50)
EST. AVERAGE GLUCOSE BLD GHB EST-MCNC: 97 MG/DL
FASTING STATUS PATIENT QL REPORTED: NORMAL
GLOBULIN SER CALC-MCNC: 2.9 G/DL (ref 1.9–3.5)
GLUCOSE SERPL-MCNC: 95 MG/DL (ref 65–99)
HBA1C MFR BLD: 5 % (ref 4–5.6)
HCT VFR BLD AUTO: 42 % (ref 42–52)
HDLC SERPL-MCNC: 37 MG/DL
HGB BLD-MCNC: 13.6 G/DL (ref 14–18)
LDLC SERPL CALC-MCNC: 65 MG/DL
MCH RBC QN AUTO: 31.6 PG (ref 27–33)
MCHC RBC AUTO-ENTMCNC: 32.4 G/DL (ref 33.7–35.3)
MCV RBC AUTO: 97.7 FL (ref 81.4–97.8)
PLATELET # BLD AUTO: 268 K/UL (ref 164–446)
PMV BLD AUTO: 9.5 FL (ref 9–12.9)
POTASSIUM SERPL-SCNC: 4.4 MMOL/L (ref 3.6–5.5)
PROT SERPL-MCNC: 7.4 G/DL (ref 6–8.2)
RBC # BLD AUTO: 4.3 M/UL (ref 4.7–6.1)
SODIUM SERPL-SCNC: 140 MMOL/L (ref 135–145)
TRIGL SERPL-MCNC: 58 MG/DL (ref 0–149)
WBC # BLD AUTO: 7.5 K/UL (ref 4.8–10.8)

## 2021-11-17 PROCEDURE — 83036 HEMOGLOBIN GLYCOSYLATED A1C: CPT

## 2021-11-17 PROCEDURE — 85027 COMPLETE CBC AUTOMATED: CPT

## 2021-11-17 PROCEDURE — 36415 COLL VENOUS BLD VENIPUNCTURE: CPT

## 2021-11-17 PROCEDURE — 80053 COMPREHEN METABOLIC PANEL: CPT

## 2021-11-17 PROCEDURE — 80061 LIPID PANEL: CPT

## 2021-11-18 ENCOUNTER — TELEPHONE (OUTPATIENT)
Dept: SCHEDULING | Facility: IMAGING CENTER | Age: 61
End: 2021-11-18

## 2022-01-26 PROBLEM — R53.81 PHYSICAL DECONDITIONING: Status: ACTIVE | Noted: 2022-01-26

## 2022-01-26 PROBLEM — E11.9 TYPE 2 DIABETES MELLITUS WITHOUT COMPLICATION, WITHOUT LONG-TERM CURRENT USE OF INSULIN (HCC): Status: ACTIVE | Noted: 2022-01-26

## 2022-01-26 PROBLEM — R53.1 LEFT-SIDED WEAKNESS: Status: ACTIVE | Noted: 2022-01-26

## 2022-02-16 ENCOUNTER — NON-PROVIDER VISIT (OUTPATIENT)
Dept: CARDIOLOGY | Facility: PHYSICIAN GROUP | Age: 62
End: 2022-02-16
Payer: MEDICAID

## 2022-02-16 VITALS
DIASTOLIC BLOOD PRESSURE: 80 MMHG | OXYGEN SATURATION: 97 % | RESPIRATION RATE: 20 BRPM | SYSTOLIC BLOOD PRESSURE: 114 MMHG | HEIGHT: 68 IN | BODY MASS INDEX: 33.91 KG/M2 | HEART RATE: 86 BPM | WEIGHT: 223.77 LBS

## 2022-02-16 DIAGNOSIS — Z95.810 AICD (AUTOMATIC CARDIOVERTER/DEFIBRILLATOR) PRESENT: ICD-10-CM

## 2022-02-16 DIAGNOSIS — I46.9 CARDIAC ARREST (HCC): ICD-10-CM

## 2022-02-16 DIAGNOSIS — I25.10 CORONARY ARTERY DISEASE INVOLVING NATIVE CORONARY ARTERY OF NATIVE HEART WITHOUT ANGINA PECTORIS: ICD-10-CM

## 2022-02-16 DIAGNOSIS — I25.5 ISCHEMIC CARDIOMYOPATHY: ICD-10-CM

## 2022-02-16 PROCEDURE — 93283 PRGRMG EVAL IMPLANTABLE DFB: CPT | Performed by: NURSE PRACTITIONER

## 2022-02-16 ASSESSMENT — FIBROSIS 4 INDEX: FIB4 SCORE: 0.82

## 2022-02-16 NOTE — PROGRESS NOTES
Device is working normally.  No device therapy.  No mode switching episodes.  Normal sensing and capture of RA and RV leads; stable impedances. Battery longevity is 10.6 years.  No changes are made today.    He does have follow-up with Dr. Albright in April 2022 in [place].    Follow-up in 6 months for next AICD check with me.

## 2022-07-04 ENCOUNTER — NON-PROVIDER VISIT (OUTPATIENT)
Dept: CARDIOLOGY | Facility: MEDICAL CENTER | Age: 62
End: 2022-07-04

## 2022-07-22 PROBLEM — I25.83 CORONARY ARTERY DISEASE DUE TO LIPID RICH PLAQUE: Status: ACTIVE | Noted: 2021-06-23

## 2022-07-28 PROBLEM — D64.9 ANEMIA: Status: ACTIVE | Noted: 2022-07-28

## 2022-07-28 PROBLEM — M79.641 RIGHT HAND PAIN: Status: ACTIVE | Noted: 2022-07-28

## 2022-07-28 PROBLEM — R73.9 HYPERGLYCEMIA: Status: RESOLVED | Noted: 2021-06-21 | Resolved: 2022-07-28

## 2022-10-05 ENCOUNTER — NON-PROVIDER VISIT (OUTPATIENT)
Dept: CARDIOLOGY | Facility: MEDICAL CENTER | Age: 62
End: 2022-10-05
Payer: MEDICAID

## 2022-10-05 PROCEDURE — 93295 DEV INTERROG REMOTE 1/2/MLT: CPT | Performed by: INTERNAL MEDICINE

## 2022-10-06 NOTE — CARDIAC REMOTE MONITOR - SCAN
Device transmission reviewed. Device demonstrated appropriate function.       Electronically Signed by: Dylon Camacho M.D.    10/7/2022  5:29 PM

## 2023-01-06 ENCOUNTER — NON-PROVIDER VISIT (OUTPATIENT)
Dept: CARDIOLOGY | Facility: MEDICAL CENTER | Age: 63
End: 2023-01-06

## 2023-01-06 NOTE — CARDIAC REMOTE MONITOR - SCAN
Device transmission reviewed. Device demonstrated appropriate function.       Electronically Signed by: Jes Teague M.D.    1/6/2023  11:45 AM

## 2023-01-26 PROBLEM — L60.0 INGROWN TOENAIL: Status: ACTIVE | Noted: 2023-01-26

## 2023-01-26 PROBLEM — I10 ESSENTIAL HYPERTENSION: Status: ACTIVE | Noted: 2023-01-26

## 2023-01-26 PROBLEM — M17.11 PRIMARY OSTEOARTHRITIS OF RIGHT KNEE: Status: ACTIVE | Noted: 2023-01-26

## 2023-03-01 ENCOUNTER — APPOINTMENT (OUTPATIENT)
Dept: CARDIOLOGY | Facility: PHYSICIAN GROUP | Age: 63
End: 2023-03-01
Payer: MEDICAID

## 2023-03-08 ENCOUNTER — APPOINTMENT (OUTPATIENT)
Dept: CARDIOLOGY | Facility: PHYSICIAN GROUP | Age: 63
End: 2023-03-08
Payer: MEDICAID

## 2023-04-08 ENCOUNTER — NON-PROVIDER VISIT (OUTPATIENT)
Dept: CARDIOLOGY | Facility: MEDICAL CENTER | Age: 63
End: 2023-04-08
Payer: MEDICAID

## 2023-04-08 PROCEDURE — 93295 DEV INTERROG REMOTE 1/2/MLT: CPT | Performed by: INTERNAL MEDICINE

## 2023-04-10 NOTE — CARDIAC REMOTE MONITOR - SCAN
Device transmission reviewed. Device demonstrated appropriate function.       Electronically Signed by: Tarik Sim M.D.    4/10/2023  2:46 PM

## 2023-05-03 ENCOUNTER — APPOINTMENT (OUTPATIENT)
Dept: CARDIOLOGY | Facility: PHYSICIAN GROUP | Age: 63
End: 2023-05-03
Payer: MEDICAID

## 2023-05-10 ENCOUNTER — APPOINTMENT (OUTPATIENT)
Dept: CARDIOLOGY | Facility: PHYSICIAN GROUP | Age: 63
End: 2023-05-10
Payer: MEDICAID

## 2023-07-09 ENCOUNTER — NON-PROVIDER VISIT (OUTPATIENT)
Dept: CARDIOLOGY | Facility: MEDICAL CENTER | Age: 63
End: 2023-07-09
Payer: MEDICAID

## 2023-07-09 PROCEDURE — 93295 DEV INTERROG REMOTE 1/2/MLT: CPT | Performed by: INTERNAL MEDICINE

## 2023-07-10 NOTE — CARDIAC REMOTE MONITOR - SCAN
Device transmission reviewed. Device demonstrated appropriate function.       Electronically Signed by: Dylon Camacho M.D.    7/14/2023  11:35 AM

## 2023-10-09 ENCOUNTER — NON-PROVIDER VISIT (OUTPATIENT)
Dept: CARDIOLOGY | Facility: MEDICAL CENTER | Age: 63
End: 2023-10-09
Payer: MEDICARE

## 2023-10-09 PROCEDURE — 93295 DEV INTERROG REMOTE 1/2/MLT: CPT | Performed by: INTERNAL MEDICINE

## 2023-10-09 NOTE — CARDIAC REMOTE MONITOR - SCAN
Device transmission reviewed. Device demonstrated appropriate function.       Electronically Signed by: Jes Teague M.D.    10/16/2023  1:21 PM

## 2024-01-08 ENCOUNTER — NON-PROVIDER VISIT (OUTPATIENT)
Dept: CARDIOLOGY | Facility: MEDICAL CENTER | Age: 64
End: 2024-01-08
Payer: MEDICARE

## 2024-01-08 PROCEDURE — 93295 DEV INTERROG REMOTE 1/2/MLT: CPT | Performed by: INTERNAL MEDICINE

## 2024-01-09 NOTE — CARDIAC REMOTE MONITOR - SCAN
Device transmission reviewed. Device demonstrated appropriate function.       Electronically Signed by: Jes Teague M.D.    1/10/2024  9:17 AM

## 2024-04-09 ENCOUNTER — NON-PROVIDER VISIT (OUTPATIENT)
Dept: CARDIOLOGY | Facility: MEDICAL CENTER | Age: 64
End: 2024-04-09
Payer: MEDICARE

## 2024-04-09 PROCEDURE — 93295 DEV INTERROG REMOTE 1/2/MLT: CPT | Performed by: INTERNAL MEDICINE

## 2024-04-09 NOTE — CARDIAC REMOTE MONITOR - SCAN
Device transmission reviewed. Device demonstrated appropriate function.       Electronically Signed by: Dylon Camacho M.D.    4/9/2024  4:02 PM

## 2024-07-10 ENCOUNTER — NON-PROVIDER VISIT (OUTPATIENT)
Dept: CARDIOLOGY | Facility: MEDICAL CENTER | Age: 64
End: 2024-07-10
Payer: MEDICARE

## 2024-07-10 PROCEDURE — 93295 DEV INTERROG REMOTE 1/2/MLT: CPT | Performed by: INTERNAL MEDICINE

## 2024-10-10 ENCOUNTER — NON-PROVIDER VISIT (OUTPATIENT)
Dept: CARDIOLOGY | Facility: MEDICAL CENTER | Age: 64
End: 2024-10-10
Payer: MEDICARE

## 2024-10-10 PROCEDURE — 93295 DEV INTERROG REMOTE 1/2/MLT: CPT | Performed by: STUDENT IN AN ORGANIZED HEALTH CARE EDUCATION/TRAINING PROGRAM

## 2024-10-18 DIAGNOSIS — I25.5 ISCHEMIC CARDIOMYOPATHY: ICD-10-CM

## 2024-10-18 RX ORDER — METOPROLOL SUCCINATE 100 MG/1
100 TABLET, EXTENDED RELEASE ORAL DAILY
Qty: 30 TABLET | Refills: 0 | Status: SHIPPED | OUTPATIENT
Start: 2024-10-18

## 2024-10-18 RX ORDER — SPIRONOLACTONE 25 MG/1
25 TABLET ORAL DAILY
Qty: 30 TABLET | Refills: 0 | Status: SHIPPED | OUTPATIENT
Start: 2024-10-18

## 2024-11-08 ENCOUNTER — TELEPHONE (OUTPATIENT)
Dept: CARDIOLOGY | Facility: MEDICAL CENTER | Age: 64
End: 2024-11-08
Payer: MEDICARE

## 2024-11-08 NOTE — TELEPHONE ENCOUNTER
Attempted to call patient to schedule pacemaker check as last appointment was in 2022 and there are some needed programming changes, no answer, left voicemail, and sent [employer] message.

## 2025-01-10 ENCOUNTER — NON-PROVIDER VISIT (OUTPATIENT)
Dept: CARDIOLOGY | Facility: MEDICAL CENTER | Age: 65
End: 2025-01-10
Payer: MEDICARE

## 2025-01-10 PROCEDURE — 93295 DEV INTERROG REMOTE 1/2/MLT: CPT | Performed by: STUDENT IN AN ORGANIZED HEALTH CARE EDUCATION/TRAINING PROGRAM

## 2025-01-13 NOTE — CARDIAC REMOTE MONITOR - SCAN
Device transmission reviewed. Device demonstrated appropriate function.       Electronically Signed by: Diaz Lopez MD, PhD    1/13/2025  12:08 PM

## 2025-01-22 DIAGNOSIS — E78.5 DYSLIPIDEMIA: ICD-10-CM

## 2025-01-23 RX ORDER — ATORVASTATIN CALCIUM 80 MG/1
80 TABLET, FILM COATED ORAL EVERY EVENING
Qty: 90 TABLET | Refills: 0 | Status: SHIPPED | OUTPATIENT
Start: 2025-01-23

## 2025-01-23 NOTE — TELEPHONE ENCOUNTER
Is the patient due for a refill? Yes    Was the patient seen the last 15 months? No    Date of last office visit: 2.3.2023    Does the patient have an upcoming appointment?  No       Provider to refill:CW    Does the patient have skilled nursing Plus and need 100-day supply? (This applies to ALL medications) Patient does not have SCP

## 2025-04-12 ENCOUNTER — NON-PROVIDER VISIT (OUTPATIENT)
Dept: CARDIOLOGY | Facility: MEDICAL CENTER | Age: 65
End: 2025-04-12
Payer: MEDICARE

## 2025-04-14 PROCEDURE — 93295 DEV INTERROG REMOTE 1/2/MLT: CPT | Performed by: STUDENT IN AN ORGANIZED HEALTH CARE EDUCATION/TRAINING PROGRAM

## 2025-04-15 NOTE — CARDIAC REMOTE MONITOR - SCAN
Device transmission reviewed. Device demonstrated appropriate function.       Electronically Signed by: Diaz Lopez MD, PhD    4/15/2025  2:41 PM

## 2025-07-10 ENCOUNTER — TELEPHONE (OUTPATIENT)
Dept: CARDIOLOGY | Facility: MEDICAL CENTER | Age: 65
End: 2025-07-10
Payer: MEDICARE

## 2025-07-10 NOTE — TELEPHONE ENCOUNTER
Attempted to call patient to schedule pacemaker check as last appointment was 2/16/2022 and there are some needed programming changes, no answer, left voicemail, and sent [employer] message.

## 2025-07-10 NOTE — TELEPHONE ENCOUNTER
Caller: Yasmany Huerta    Topic/issue: MEDICAL ADVICE    Yasmany states that he is having issues with his insurance and he will schedule an appointment once he gets this situated. Please be advised.    Thank you,  Sanjay AMAYA    Callback Number: [phone] (home)

## 2025-07-13 ENCOUNTER — NON-PROVIDER VISIT (OUTPATIENT)
Dept: CARDIOLOGY | Facility: MEDICAL CENTER | Age: 65
End: 2025-07-13
Payer: MEDICARE

## 2025-07-14 PROCEDURE — 93295 DEV INTERROG REMOTE 1/2/MLT: CPT | Performed by: INTERNAL MEDICINE

## 2025-07-14 NOTE — CARDIAC REMOTE MONITOR - SCAN
Device transmission reviewed. Device demonstrated appropriate function.       Electronically Signed by: Jes Teague M.D.    7/31/2025  4:30 PM